# Patient Record
Sex: FEMALE | Race: WHITE | NOT HISPANIC OR LATINO | URBAN - METROPOLITAN AREA
[De-identification: names, ages, dates, MRNs, and addresses within clinical notes are randomized per-mention and may not be internally consistent; named-entity substitution may affect disease eponyms.]

---

## 2021-04-15 DIAGNOSIS — Z23 ENCOUNTER FOR IMMUNIZATION: ICD-10-CM

## 2021-06-11 ENCOUNTER — OFFICE VISIT (OUTPATIENT)
Dept: URGENT CARE | Facility: CLINIC | Age: 76
End: 2021-06-11
Payer: MEDICARE

## 2021-06-11 VITALS
BODY MASS INDEX: 33.43 KG/M2 | WEIGHT: 208 LBS | OXYGEN SATURATION: 98 % | HEART RATE: 90 BPM | TEMPERATURE: 99.3 F | HEIGHT: 66 IN | SYSTOLIC BLOOD PRESSURE: 130 MMHG | DIASTOLIC BLOOD PRESSURE: 90 MMHG | RESPIRATION RATE: 18 BRPM

## 2021-06-11 DIAGNOSIS — R09.82 POSTNASAL DRIP: Primary | ICD-10-CM

## 2021-06-11 PROCEDURE — 99213 OFFICE O/P EST LOW 20 MIN: CPT | Performed by: FAMILY MEDICINE

## 2021-06-11 RX ORDER — RAMIPRIL 10 MG/1
10 CAPSULE ORAL DAILY
COMMUNITY
End: 2022-04-18 | Stop reason: SDUPTHER

## 2021-06-11 RX ORDER — CETIRIZINE HYDROCHLORIDE 10 MG/1
10 TABLET ORAL
COMMUNITY

## 2021-06-11 RX ORDER — CARVEDILOL 12.5 MG/1
12.5 TABLET ORAL 2 TIMES DAILY WITH MEALS
COMMUNITY
End: 2022-04-18 | Stop reason: SDUPTHER

## 2021-06-11 RX ORDER — ATORVASTATIN CALCIUM 80 MG/1
80 TABLET, FILM COATED ORAL DAILY
COMMUNITY
End: 2022-04-18 | Stop reason: SDUPTHER

## 2021-06-11 RX ORDER — FLUTICASONE PROPIONATE 50 MCG
1 SPRAY, SUSPENSION (ML) NASAL 2 TIMES DAILY
Qty: 16 G | Refills: 0 | Status: SHIPPED | OUTPATIENT
Start: 2021-06-11 | End: 2022-05-19

## 2021-06-11 RX ORDER — NITROGLYCERIN 0.4 MG/1
0.4 TABLET SUBLINGUAL
COMMUNITY

## 2021-06-11 RX ORDER — METFORMIN HYDROCHLORIDE 750 MG/1
750 TABLET, EXTENDED RELEASE ORAL
COMMUNITY
End: 2022-04-18 | Stop reason: SDUPTHER

## 2021-06-11 RX ORDER — ESOMEPRAZOLE MAGNESIUM 40 MG/1
40 CAPSULE, DELAYED RELEASE ORAL DAILY
COMMUNITY

## 2021-06-11 RX ORDER — BENZONATATE 100 MG/1
100 CAPSULE ORAL 3 TIMES DAILY PRN
Qty: 20 CAPSULE | Refills: 0 | Status: SHIPPED | OUTPATIENT
Start: 2021-06-11 | End: 2022-05-19

## 2021-06-11 NOTE — PROGRESS NOTES
[facility]        NAME: Gasper Britt is a 78 y o  female  : 1945    MRN: [id number]  DATE: 2021  TIME: 1:55 PM    Assessment and Plan   Postnasal drip [R09 82]  1  Postnasal drip  benzonatate (TESSALON PERLES) 100 mg capsule    fluticasone (FLONASE) 50 mcg/act nasal spray     Bacterial sinusitis, strep pharyngitis and pneumonia unlikely per clinical evaluation  Likely secondary to postnasal drip from rhinitis  Patient advised on supportive therapy including remaining well hydrated, avoiding cold fluids and gargling with warm salt water twice daily  Flonase and Tessalon Perles prescribed for symptomatic relief  May f/u with PCP in a few days if Sxs worsen  Patient Instructions     Follow up with PCP in 3-5 days  Proceed to  ER if symptoms worsen  Chief Complaint     Chief Complaint   Patient presents with    Sinusitis     Patient complains of scratchy throat, nasal congestion, cough starting Wed  History of Present Illness       68-year-old female presents today with URI symptoms which started about 3-4 days ago including coughing, rhinorrhea, sinus pressure with some headaches  Cough affects her sleep  Also reports some chest pain with coughing  Her granddaughter had similar symptoms prior to the onset of hers  Denies any obvious fevers, chills, dyspnea or abdominal pain  Review of Systems   Review of Systems   Constitutional: Negative for chills and fever  HENT: Positive for rhinorrhea and sinus pressure  Negative for congestion  Respiratory: Positive for cough  Negative for shortness of breath and wheezing  Cardiovascular: Positive for chest pain (with cough)  Gastrointestinal: Positive for nausea  Negative for abdominal pain  Neurological: Positive for headaches  Negative for dizziness  Psychiatric/Behavioral: Positive for sleep disturbance           Current Medications       Current Outpatient Medications:     ASPIRIN 81 PO, Take 81 mg by mouth, Disp: , Rfl:     atorvastatin (LIPITOR) 80 mg tablet, Take 80 mg by mouth daily, Disp: , Rfl:     Calcium Carb-Cholecalciferol 600-800 MG-UNIT CHEW, Chew 1 tablet daily, Disp: , Rfl:     carvedilol (COREG) 12 5 mg tablet, Take 12 5 mg by mouth 2 (two) times a day with meals, Disp: , Rfl:     cetirizine (ZyrTEC Allergy) 10 mg tablet, Take 10 mg by mouth, Disp: , Rfl:     esomeprazole (NexIUM) 40 MG capsule, Take 40 mg by mouth daily, Disp: , Rfl:     metFORMIN (GLUCOPHAGE-XR) 750 mg 24 hr tablet, Take 750 mg by mouth daily with breakfast, Disp: , Rfl:     ramipril (ALTACE) 10 MG capsule, Take 10 mg by mouth daily, Disp: , Rfl:     benzonatate (TESSALON PERLES) 100 mg capsule, Take 1 capsule (100 mg total) by mouth 3 (three) times a day as needed for cough, Disp: 20 capsule, Rfl: 0    fluticasone (FLONASE) 50 mcg/act nasal spray, 1 spray into each nostril 2 (two) times a day, Disp: 16 g, Rfl: 0    nitroglycerin (NITROSTAT) 0 4 mg SL tablet, Place 0 4 mg under the tongue, Disp: , Rfl:     Current Allergies     Allergies as of 06/11/2021 - Reviewed 06/11/2021   Allergen Reaction Noted    Prasugrel Rash 02/09/2018            The following portions of the patient's history were reviewed and updated as appropriate: allergies, current medications, past family history, past medical history, past social history, past surgical history and problem list      Past Medical History:   Diagnosis Date    GERD (gastroesophageal reflux disease)     Heart attack ([facility] [address] )     High cholesterol     Hypertension        Past Surgical History:   Procedure Laterality Date    CARPAL TUNNEL RELEASE      CORONARY ANGIOPLASTY WITH STENT PLACEMENT         History reviewed  No pertinent family history  Medications have been verified  Objective   /90   Pulse 90   Temp 99 3 °F (37 4 °C) (Tympanic)   Resp 18   Ht 5' 6" (1 676 m)   Wt 94 3 kg (208 lb)   SpO2 98%   BMI 33 57 kg/m²   No LMP recorded  Physical Exam     Physical Exam  Vitals signs and nursing note reviewed  Constitutional:       General: She is in acute distress (Occasional cough)  Appearance: Normal appearance  She is obese  She is not ill-appearing, toxic-appearing or diaphoretic  HENT:      Head: Normocephalic and atraumatic  Nose: Rhinorrhea present  Comments: Inflamed nasal mucosa     Mouth/Throat:      Mouth: Mucous membranes are moist       Pharynx: No posterior oropharyngeal erythema  Eyes:      General:         Right eye: No discharge  Left eye: No discharge  Conjunctiva/sclera: Conjunctivae normal    Cardiovascular:      Rate and Rhythm: Normal rate and regular rhythm  Pulmonary:      Effort: Pulmonary effort is normal  No respiratory distress  Breath sounds: Normal breath sounds  No wheezing, rhonchi or rales  Skin:     General: Skin is warm  Findings: No erythema  Neurological:      General: No focal deficit present  Mental Status: She is alert and oriented to person, place, and time  Psychiatric:         Mood and Affect: Mood normal          Behavior: Behavior normal          Thought Content:  Thought content normal          Judgment: Judgment normal

## 2022-04-18 ENCOUNTER — OFFICE VISIT (OUTPATIENT)
Dept: CARDIOLOGY CLINIC | Facility: CLINIC | Age: 77
End: 2022-04-18
Payer: MEDICARE

## 2022-04-18 VITALS
TEMPERATURE: 99.9 F | HEIGHT: 66 IN | WEIGHT: 210 LBS | BODY MASS INDEX: 33.75 KG/M2 | SYSTOLIC BLOOD PRESSURE: 138 MMHG | HEART RATE: 69 BPM | OXYGEN SATURATION: 95 % | DIASTOLIC BLOOD PRESSURE: 80 MMHG

## 2022-04-18 DIAGNOSIS — R06.00 DYSPNEA ON EXERTION: ICD-10-CM

## 2022-04-18 DIAGNOSIS — R06.02 SHORTNESS OF BREATH: ICD-10-CM

## 2022-04-18 DIAGNOSIS — Z76.89 ENCOUNTER TO ESTABLISH CARE: Primary | ICD-10-CM

## 2022-04-18 DIAGNOSIS — E11.9 TYPE 2 DIABETES MELLITUS WITHOUT COMPLICATION, WITHOUT LONG-TERM CURRENT USE OF INSULIN (HCC): ICD-10-CM

## 2022-04-18 DIAGNOSIS — I25.10 CORONARY ARTERY DISEASE INVOLVING NATIVE CORONARY ARTERY OF NATIVE HEART WITHOUT ANGINA PECTORIS: ICD-10-CM

## 2022-04-18 DIAGNOSIS — E78.5 DYSLIPIDEMIA: ICD-10-CM

## 2022-04-18 PROBLEM — I25.118 CORONARY ARTERY DISEASE OF NATIVE ARTERY OF NATIVE HEART WITH STABLE ANGINA PECTORIS (HCC): Status: ACTIVE | Noted: 2022-04-18

## 2022-04-18 PROCEDURE — 99204 OFFICE O/P NEW MOD 45 MIN: CPT | Performed by: INTERNAL MEDICINE

## 2022-04-18 RX ORDER — ATORVASTATIN CALCIUM 80 MG/1
80 TABLET, FILM COATED ORAL DAILY
Qty: 30 TABLET | Refills: 0 | Status: SHIPPED | OUTPATIENT
Start: 2022-04-18 | End: 2022-05-19 | Stop reason: SDUPTHER

## 2022-04-18 RX ORDER — RAMIPRIL 10 MG/1
10 CAPSULE ORAL DAILY
Qty: 30 CAPSULE | Refills: 0 | Status: SHIPPED | OUTPATIENT
Start: 2022-04-18 | End: 2022-05-19 | Stop reason: SDUPTHER

## 2022-04-18 RX ORDER — CARVEDILOL 12.5 MG/1
12.5 TABLET ORAL 2 TIMES DAILY WITH MEALS
Qty: 60 TABLET | Refills: 0 | Status: SHIPPED | OUTPATIENT
Start: 2022-04-18 | End: 2022-05-19 | Stop reason: SDUPTHER

## 2022-04-18 RX ORDER — METFORMIN HYDROCHLORIDE 750 MG/1
750 TABLET, EXTENDED RELEASE ORAL
Qty: 30 TABLET | Refills: 0 | Status: SHIPPED | OUTPATIENT
Start: 2022-04-18 | End: 2022-05-19 | Stop reason: SDUPTHER

## 2022-04-18 NOTE — PROGRESS NOTES
[facility]  [address] [facility] [address]   [place], [address]    Cardiology Consultation    Consuelo Marshall  [id number]  1945      Consult for: CAD  Appreciate consult by: No primary care provider on file  Discussion/Summary:     1  Encounter to establish care  - Blood work ordered including Lipid panel and CMP    - Encouraged to establish care with local PCP   - POCT ECG    2  Coronary artery disease involving native coronary artery of native heart without angina pectoris  - She has a history of multivessel PCI  She denies any angina symptoms  She has been feeling worsening dyspnea recently however  Will schedule for nuclear stress test for further evaluation  Patient cannot walk on treadmill  Study will be done with pharmacological agent  She had normal nuclear study in 2020   - continue carvedilol and atorvastatin  - lipid panel ordered  3  Dyspnea on exertion  - echocardiogram ordered  4  Type 2 diabetes mellitus without complication, without long-term current use of insulin (HCC)  - Continue metformin    - HEMOGLOBIN A1C W/ EAG ESTIMATION    5  Dyslipidemia  - continue atorvastatin  - lipid panel ordered  HPI:     Consuelo Marshall is a 78 y o  here for evaluation of coronary artery disease  The patient recently moved to the area  Was previously followed with a cardiologist in at [place] [address]  Records were reviewed  She has a history of coronary artery disease with previous multivessel PCI in 2012 and 2013  She denies any history of myocardial infarction  She was feeling fatigue and weakness prior to stent placement which improved after her stents were complete  She had stress test done in October 2020 which was normal   Since then however she has noticed worsening exertional dyspnea  She is unsure if it is due to poor conditioning    Onset was gradual   She denies any lower extremity edema, orthopnea or paroxysmal nocturnal dyspnea  She denies any chest pain  PCI (mLAD and D1 stents) - 1/9/2013   PCI (PCI: pRCA, mRCA, PDA) - 5/29/2012         Past Medical History:   Diagnosis Date    GERD (gastroesophageal reflux disease)     [facility] [address] )     High cholesterol     Hypertension      Social History     Tobacco Use    Smoking status: Former Smoker    Smokeless tobacco: Never Used   Vaping Use    Vaping Use: Never used   Substance Use Topics    Alcohol use: Not on file    Drug use: Not on file      No family history on file    Past Surgical History:   Procedure Laterality Date    CARPAL TUNNEL RELEASE      CORONARY ANGIOPLASTY WITH STENT PLACEMENT         Current Outpatient Medications:     ASPIRIN 81 PO, Take 81 mg by mouth, Disp: , Rfl:     atorvastatin (LIPITOR) 80 mg tablet, Take 1 tablet (80 mg total) by mouth daily, Disp: 30 tablet, Rfl: 0    Calcium Carb-Cholecalciferol 600-800 MG-UNIT CHEW, Chew 1 tablet daily, Disp: , Rfl:     carvedilol (COREG) 12 5 mg tablet, Take 1 tablet (12 5 mg total) by mouth 2 (two) times a day with meals, Disp: 60 tablet, Rfl: 0    cetirizine (ZyrTEC Allergy) 10 mg tablet, Take 10 mg by mouth, Disp: , Rfl:     esomeprazole (NexIUM) 40 MG capsule, Take 40 mg by mouth daily, Disp: , Rfl:     metFORMIN (GLUCOPHAGE-XR) 750 mg 24 hr tablet, Take 1 tablet (750 mg total) by mouth daily with breakfast, Disp: 30 tablet, Rfl: 0    nitroglycerin (NITROSTAT) 0 4 mg SL tablet, Place 0 4 mg under the tongue, Disp: , Rfl:     ramipril (ALTACE) 10 MG capsule, Take 1 capsule (10 mg total) by mouth daily, Disp: 30 capsule, Rfl: 0    benzonatate (TESSALON PERLES) 100 mg capsule, Take 1 capsule (100 mg total) by mouth 3 (three) times a day as needed for cough, Disp: 20 capsule, Rfl: 0    fluticasone (FLONASE) 50 mcg/act nasal spray, 1 spray into each nostril 2 (two) times a day, Disp: 16 g, Rfl: 0  Allergies   Allergen Reactions    Prasugrel Rash     effient       Review of Systems:   Review of Systems   Constitutional: Positive for fatigue  Respiratory: Positive for shortness of breath  Cardiovascular: Negative for chest pain, palpitations and leg swelling  Musculoskeletal: Positive for arthralgias  All other systems reviewed and are negative  Physical Examination:     Vitals:    04/18/22 1314   BP: 138/80   BP Location: Right arm   Patient Position: Sitting   Cuff Size: Large   Pulse: 69   Temp: 99 9 °F (37 7 °C)   SpO2: 95%   Weight: 95 3 kg (210 lb)   Height: 5' 6" (1 676 m)       Physical Exam   Constitutional: She appears healthy  No distress  Eyes: Pupils are equal, round, and reactive to light  Conjunctivae are normal    Neck: No JVD present  Cardiovascular: Normal rate, regular rhythm and normal heart sounds  Exam reveals no gallop and no friction rub  No murmur heard  Pulmonary/Chest: Effort normal and breath sounds normal  She has no wheezes  She has no rales  Musculoskeletal:         General: No tenderness, deformity or edema  Cervical back: Normal range of motion and neck supple  Neurological: She is alert and oriented to person, place, and time  Skin: Skin is warm and dry  Labs:   No results found for: WBC, HGB, HCT, MCV, RDW, PLT  BMP:  No results found for: SODIUM, K, CL, CO2, ANIONGAP, BUN, CREATININE, GLUC, GLUF, CALCIUM, CORRECTEDCA, EGFR, MG  LFT:  No results found for: AST, ALT, ALKPHOS, TP, ALB   No results found for: [id number]  No results found for: HGBA1C  Lipid Profile:   No results found for: CHOLESTEROL, HDL, LDLCALC, TRIG  No results found for: CHOLESTEROL  No results found for: CKTOTAL, CKMB, CKMBINDEX, TROPONINI  No results found for: NTBNP   No results found for this or any previous visit (from the past 672 hour(s))  Imaging & Testing   I have personally reviewed pertinent reports  Cardiac Testing   No results found for this or any previous visit  EKG: Personally reviewed      Normal sinus rhythm with LVH      Domenico Waller Jun WORKMAN  [phone]  Please call with any questions

## 2022-04-19 PROBLEM — E78.5 DYSLIPIDEMIA: Status: ACTIVE | Noted: 2022-04-19

## 2022-04-19 PROBLEM — E11.9 TYPE 2 DIABETES MELLITUS WITHOUT COMPLICATION, WITHOUT LONG-TERM CURRENT USE OF INSULIN (HCC): Status: ACTIVE | Noted: 2022-04-19

## 2022-04-19 PROBLEM — I25.10 CORONARY ARTERY DISEASE INVOLVING NATIVE CORONARY ARTERY OF NATIVE HEART WITHOUT ANGINA PECTORIS: Status: ACTIVE | Noted: 2022-04-18

## 2022-04-19 PROCEDURE — 93000 ELECTROCARDIOGRAM COMPLETE: CPT | Performed by: INTERNAL MEDICINE

## 2022-05-03 ENCOUNTER — APPOINTMENT (OUTPATIENT)
Dept: LAB | Facility: HOSPITAL | Age: 77
End: 2022-05-03
Payer: MEDICARE

## 2022-05-03 LAB
ALBUMIN SERPL BCP-MCNC: 3.7 G/DL (ref 3.5–5)
ALP SERPL-CCNC: 94 U/L (ref 46–116)
ALT SERPL W P-5'-P-CCNC: 48 U/L (ref 12–78)
ANION GAP SERPL CALCULATED.3IONS-SCNC: 8 MMOL/L (ref 4–13)
AST SERPL W P-5'-P-CCNC: 21 U/L (ref 5–45)
BILIRUB SERPL-MCNC: 0.64 MG/DL (ref 0.2–1)
BUN SERPL-MCNC: 14 MG/DL (ref 5–25)
CALCIUM SERPL-MCNC: 8.9 MG/DL (ref 8.3–10.1)
CHLORIDE SERPL-SCNC: 102 MMOL/L (ref 100–108)
CHOLEST SERPL-MCNC: 149 MG/DL
CO2 SERPL-SCNC: 28 MMOL/L (ref 21–32)
CREAT SERPL-MCNC: 0.91 MG/DL (ref 0.6–1.3)
EST. AVERAGE GLUCOSE BLD GHB EST-MCNC: 177 MG/DL
GFR SERPL CREATININE-BSD FRML MDRD: 62 ML/MIN/1.73SQ M
GLUCOSE P FAST SERPL-MCNC: 161 MG/DL (ref 65–99)
HBA1C MFR BLD: 7.8 %
HDLC SERPL-MCNC: 44 MG/DL
LDLC SERPL CALC-MCNC: 75 MG/DL (ref 0–100)
NONHDLC SERPL-MCNC: 105 MG/DL
POTASSIUM SERPL-SCNC: 3.7 MMOL/L (ref 3.5–5.3)
PROT SERPL-MCNC: 7.5 G/DL (ref 6.4–8.2)
SODIUM SERPL-SCNC: 138 MMOL/L (ref 136–145)
TRIGL SERPL-MCNC: 150 MG/DL

## 2022-05-03 PROCEDURE — 80061 LIPID PANEL: CPT | Performed by: INTERNAL MEDICINE

## 2022-05-03 PROCEDURE — 80053 COMPREHEN METABOLIC PANEL: CPT | Performed by: INTERNAL MEDICINE

## 2022-05-03 PROCEDURE — 36415 COLL VENOUS BLD VENIPUNCTURE: CPT | Performed by: INTERNAL MEDICINE

## 2022-05-03 PROCEDURE — 83036 HEMOGLOBIN GLYCOSYLATED A1C: CPT | Performed by: INTERNAL MEDICINE

## 2022-05-09 ENCOUNTER — TELEPHONE (OUTPATIENT)
Dept: CARDIOLOGY CLINIC | Facility: CLINIC | Age: 77
End: 2022-05-09

## 2022-05-09 NOTE — TELEPHONE ENCOUNTER
----- Message from Emerald Chaves DO sent at 5/6/2022  4:56 PM EDT -----  Can you please let the patient know I reviewed her blood work  Her sugar was high  Her cholesterol was minimally elevated  We will review during her upcoming visit

## 2022-05-10 ENCOUNTER — HOSPITAL ENCOUNTER (OUTPATIENT)
Dept: RADIOLOGY | Facility: HOSPITAL | Age: 77
Discharge: HOME | End: 2022-05-10
Attending: INTERNAL MEDICINE
Payer: MEDICARE

## 2022-05-10 ENCOUNTER — HOSPITAL ENCOUNTER (OUTPATIENT)
Dept: NON INVASIVE DIAGNOSTICS | Facility: HOSPITAL | Age: 77
Discharge: HOME | End: 2022-05-10
Attending: INTERNAL MEDICINE
Payer: MEDICARE

## 2022-05-10 VITALS
HEART RATE: 71 BPM | HEIGHT: 66 IN | WEIGHT: 210 LBS | SYSTOLIC BLOOD PRESSURE: 167 MMHG | DIASTOLIC BLOOD PRESSURE: 79 MMHG | BODY MASS INDEX: 33.75 KG/M2

## 2022-05-10 DIAGNOSIS — I25.10 CORONARY ARTERY DISEASE INVOLVING NATIVE CORONARY ARTERY OF NATIVE HEART WITHOUT ANGINA PECTORIS: ICD-10-CM

## 2022-05-10 DIAGNOSIS — R06.02 SHORTNESS OF BREATH: ICD-10-CM

## 2022-05-10 DIAGNOSIS — R06.00 DYSPNEA ON EXERTION: ICD-10-CM

## 2022-05-10 LAB
AORTIC ROOT: 3.7 CM
APICAL FOUR CHAMBER EJECTION FRACTION: 44 %
AV LVOT PEAK GRADIENT: 4 MMHG
AV PEAK GRADIENT: 11 MMHG
CHEST PAIN STATEMENT: NORMAL
DOP CALC LVOT AREA: 3.14 CM2
DOP CALC LVOT DIAMETER: 2 CM
E WAVE DECELERATION TIME: 183 MS
FRACTIONAL SHORTENING: 29 % (ref 28–44)
INTERVENTRICULAR SEPTUM IN DIASTOLE (PARASTERNAL SHORT AXIS VIEW): 1.2 CM
INTERVENTRICULAR SEPTUM: 1.2 CM (ref 0.55–1.03)
LAAS-AP2: 25.2 CM2
LAAS-AP4: 23.7 CM2
LEFT ATRIUM AREA SYSTOLE SINGLE PLANE A4C: 22.9 CM2
LEFT ATRIUM SIZE: 4.2 CM
LEFT INTERNAL DIMENSION IN SYSTOLE: 3.5 CM (ref 3.41–5.16)
LEFT VENTRICLE DIASTOLIC VOLUME (MOD BIPLANE): 131 ML (ref 102.55–230.95)
LEFT VENTRICLE SYSTOLIC VOLUME (MOD BIPLANE): 75 ML
LEFT VENTRICULAR INTERNAL DIMENSION IN DIASTOLE: 4.9 CM (ref 5.64–8.4)
LEFT VENTRICULAR POSTERIOR WALL IN END DIASTOLE: 1.2 CM (ref 0.53–1.01)
LEFT VENTRICULAR STROKE VOLUME: 60 ML
LV EF: 43 %
LVSV (TEICH): 60 ML
MAX DIASTOLIC BP: 90 MMHG
MAX HEART RATE: 104 BPM
MAX PREDICTED HEART RATE: 146 BPM
MAX. SYSTOLIC BP: 184 MMHG
MV E'TISSUE VEL-LAT: 6 CM/S
MV E'TISSUE VEL-SEP: 7 CM/S
MV PEAK A VEL: 1.13 M/S
MV PEAK E VEL: 75 CM/S
MV STENOSIS PRESSURE HALF TIME: 53 MS
MV VALVE AREA P 1/2 METHOD: 4.15 CM2
PROTOCOL NAME: NORMAL
PV PEAK GRADIENT: 4 MMHG
REASON FOR TERMINATION: NORMAL
RIGHT ATRIUM AREA SYSTOLE A4C: 15.3 CM2
RIGHT VENTRICLE ID DIMENSION: 3 CM
SL CV LEFT ATRIUM LENGTH A2C: 5.7 CM
SL CV LV DIAS VOL ENDO Z SCORE: -1.11
SL CV PED ECHO LEFT VENTRICLE DIASTOLIC VOLUME (MOD BIPLANE) 2D: 111 ML
SL CV PED ECHO LEFT VENTRICLE SYSTOLIC VOLUME (MOD BIPLANE) 2D: 50 ML
TARGET HR FORMULA: NORMAL
TEST INDICATION: NORMAL
TIME IN EXERCISE PHASE: NORMAL
TR MAX PG: 19 MMHG
TR PEAK VELOCITY: 2.2 M/S
TRICUSPID VALVE PEAK REGURGITATION VELOCITY: 2.15 M/S
Z-SCORE OF INTERVENTRICULAR SEPTUM IN END DIASTOLE: 3.39
Z-SCORE OF LEFT VENTRICULAR DIMENSION IN END DIASTOLE: -3.4
Z-SCORE OF LEFT VENTRICULAR DIMENSION IN END SYSTOLE: -1.43
Z-SCORE OF LEFT VENTRICULAR POSTERIOR WALL IN END DIASTOLE: 3.5

## 2022-05-10 PROCEDURE — 93306 TTE W/DOPPLER COMPLETE: CPT | Performed by: INTERNAL MEDICINE

## 2022-05-10 PROCEDURE — G1004 CDSM NDSC: HCPCS

## 2022-05-10 PROCEDURE — 93017 CV STRESS TEST TRACING ONLY: CPT

## 2022-05-10 PROCEDURE — A9502 TC99M TETROFOSMIN: HCPCS

## 2022-05-10 PROCEDURE — 93306 TTE W/DOPPLER COMPLETE: CPT

## 2022-05-10 PROCEDURE — 78452 HT MUSCLE IMAGE SPECT MULT: CPT

## 2022-05-10 RX ADMIN — REGADENOSON 0.4 MG: 0.08 INJECTION, SOLUTION INTRAVENOUS at 10:14

## 2022-05-11 LAB
MAX HR PERCENT: 71 %
MAX HR: 146 BPM
NUC STRESS EJECTION FRACTION: 80 %
RATE PRESSURE PRODUCT: NORMAL
SL CV REST NUCLEAR ISOTOPE DOSE: 11 MCI
SL CV STRESS NUCLEAR ISOTOPE DOSE: 32.8 MCI
SL CV STRESS RECOVERY BP: NORMAL MMHG
SL CV STRESS RECOVERY HR: 88 BPM
SL CV STRESS RECOVERY O2 SAT: 98 %
STRESS ANGINA INDEX: 1
STRESS BASELINE BP: NORMAL MMHG
STRESS BASELINE HR: 61 BPM
STRESS O2 SAT REST: 100 %
STRESS PEAK HR: 104 BPM
STRESS PERCENT HR: 71 %
STRESS POST ESTIMATED WORKLOAD: 1 METS
STRESS POST EXERCISE DUR MIN: 1 MIN
STRESS POST O2 SAT PEAK: 98 %
STRESS POST PEAK BP: 173 MMHG
STRESS TARGET HR: 104 BPM

## 2022-05-11 PROCEDURE — 78452 HT MUSCLE IMAGE SPECT MULT: CPT | Performed by: INTERNAL MEDICINE

## 2022-05-11 PROCEDURE — 93018 CV STRESS TEST I&R ONLY: CPT | Performed by: INTERNAL MEDICINE

## 2022-05-11 PROCEDURE — 93016 CV STRESS TEST SUPVJ ONLY: CPT | Performed by: INTERNAL MEDICINE

## 2022-05-19 ENCOUNTER — OFFICE VISIT (OUTPATIENT)
Dept: CARDIOLOGY CLINIC | Facility: CLINIC | Age: 77
End: 2022-05-19
Payer: MEDICARE

## 2022-05-19 VITALS
SYSTOLIC BLOOD PRESSURE: 142 MMHG | DIASTOLIC BLOOD PRESSURE: 68 MMHG | HEART RATE: 68 BPM | HEIGHT: 66 IN | WEIGHT: 208 LBS | BODY MASS INDEX: 33.43 KG/M2

## 2022-05-19 DIAGNOSIS — E11.9 TYPE 2 DIABETES MELLITUS WITHOUT COMPLICATION, WITHOUT LONG-TERM CURRENT USE OF INSULIN (HCC): ICD-10-CM

## 2022-05-19 DIAGNOSIS — I10 PRIMARY HYPERTENSION: ICD-10-CM

## 2022-05-19 DIAGNOSIS — E78.5 DYSLIPIDEMIA: ICD-10-CM

## 2022-05-19 DIAGNOSIS — I25.10 CORONARY ARTERY DISEASE INVOLVING NATIVE CORONARY ARTERY OF NATIVE HEART WITHOUT ANGINA PECTORIS: Primary | ICD-10-CM

## 2022-05-19 PROCEDURE — 99214 OFFICE O/P EST MOD 30 MIN: CPT | Performed by: INTERNAL MEDICINE

## 2022-05-19 RX ORDER — ATORVASTATIN CALCIUM 80 MG/1
80 TABLET, FILM COATED ORAL DAILY
Qty: 30 TABLET | Refills: 0 | Status: SHIPPED | OUTPATIENT
Start: 2022-05-19 | End: 2022-06-08 | Stop reason: SDUPTHER

## 2022-05-19 RX ORDER — RAMIPRIL 10 MG/1
10 CAPSULE ORAL DAILY
Qty: 90 CAPSULE | Refills: 3 | Status: SHIPPED | OUTPATIENT
Start: 2022-05-19

## 2022-05-19 RX ORDER — METFORMIN HYDROCHLORIDE 750 MG/1
750 TABLET, EXTENDED RELEASE ORAL
Qty: 30 TABLET | Refills: 0 | Status: SHIPPED | OUTPATIENT
Start: 2022-05-19 | End: 2022-06-22 | Stop reason: SDUPTHER

## 2022-05-19 RX ORDER — ATORVASTATIN CALCIUM 80 MG/1
80 TABLET, FILM COATED ORAL DAILY
Qty: 90 TABLET | Refills: 3 | Status: SHIPPED | OUTPATIENT
Start: 2022-05-19 | End: 2022-05-19 | Stop reason: SDUPTHER

## 2022-05-19 RX ORDER — METFORMIN HYDROCHLORIDE 750 MG/1
750 TABLET, EXTENDED RELEASE ORAL
Qty: 90 TABLET | Refills: 3 | Status: SHIPPED | OUTPATIENT
Start: 2022-05-19 | End: 2022-05-19 | Stop reason: SDUPTHER

## 2022-05-19 RX ORDER — CARVEDILOL 12.5 MG/1
12.5 TABLET ORAL 2 TIMES DAILY WITH MEALS
Qty: 180 TABLET | Refills: 3 | Status: SHIPPED | OUTPATIENT
Start: 2022-05-19

## 2022-05-19 NOTE — PROGRESS NOTES
Cardiology   Trip Foster DO, Tamica Huntley MD, Destiney Hernández MD, Domo Puga MD, [facility] [place]  -------------------------------------------------------------------  [facility]  [address], [address] [facility] [place] [address], [facility]  [phone]    Cardiology Follow Up  Lacne Mohamud  1945  [id number]          Assessment/Plan:    1  Coronary artery disease involving native coronary artery of native heart without angina pectoris    2  Type 2 diabetes mellitus without complication, without long-term current use of insulin ([facility] [address] )    3  Dyslipidemia    4  Primary hypertension      - recent stress test reviewed  No ischemia or infarction present  Ejection fraction was normal on echocardiogram   - Discussed risk factor reduction including refraining from smoking, eating a diet high in fruits and vegetables, maintaining a healthy weight, limiting screen time along with controlling BP and cholesterol  Encouraged to exercise 150 minutes a week at a moderate level such as a fast walk or 75 minutes of high intensity   - A1C was elevated on blood work  Given history of CAD diabetes, will begin Jardiance 25 mg daily she should use this in addition to metformin  Encouraged to establish care with primary medical doctor  - blood pressure is elevated today  Will recheck next visit  May need to adjust medications if needed to reach blood pressure goal of less than 130/80   - continue atorvastatin 80 mg daily  - continue carvedilol 12 5 mg b i d  And ramipril 10 mg daily  Interval History:     Lance Mohamud is 78 y o  female here for followup of recent cardiac testing  The patient had stress test and echocardiogram done because of exertional dyspnea  She has a history of coronary artery disease with previous multivessel PCI in 2012 and 2013 (PCI mLAD and D1 stents - 1/9/2013 + PCI: Norris Hagen, LYNETTE - 5/29/2012     She denies any history of myocardial infarction  She was feeling fatigue and weakness prior to stent placement which improved after her stents were complete  Nuclear stress test was done which did not show any ischemia or infarction  Ejection fraction was 80%  Echocardiogram showed ejection fraction of 60% with moderate LA dilation and mild valve disease  Since her last visit, she denies any palpitations, chest pain,  LE edema, orthopnea or PND  She still feels some shortness of breath with exertion  Diet is overall unchanged  There has not been a significant change in weight        The following portions of the patient's history were reviewed and updated as appropriate: allergies, current medications, past family history, past medical history, past social history, past surgical history, and problem list        Current Outpatient Medications:     ASPIRIN 81 PO, Take 81 mg by mouth, Disp: , Rfl:     atorvastatin (LIPITOR) 80 mg tablet, Take 1 tablet (80 mg total) by mouth daily, Disp: 30 tablet, Rfl: 0    Calcium Carb-Cholecalciferol 600-800 MG-UNIT CHEW, Chew 1 tablet daily, Disp: , Rfl:     carvedilol (COREG) 12 5 mg tablet, Take 1 tablet (12 5 mg total) by mouth 2 (two) times a day with meals, Disp: 60 tablet, Rfl: 0    esomeprazole (NexIUM) 40 MG capsule, Take 40 mg by mouth daily, Disp: , Rfl:     metFORMIN (GLUCOPHAGE-XR) 750 mg 24 hr tablet, Take 1 tablet (750 mg total) by mouth daily with breakfast, Disp: 30 tablet, Rfl: 0    ramipril (ALTACE) 10 MG capsule, Take 1 capsule (10 mg total) by mouth daily, Disp: 30 capsule, Rfl: 0    benzonatate (TESSALON PERLES) 100 mg capsule, Take 1 capsule (100 mg total) by mouth 3 (three) times a day as needed for cough, Disp: 20 capsule, Rfl: 0    cetirizine (ZyrTEC) 10 mg tablet, Take 10 mg by mouth (Patient not taking: Reported on 5/19/2022), Disp: , Rfl:     fluticasone (FLONASE) 50 mcg/act nasal spray, 1 spray into each nostril 2 (two) times a day, Disp: 16 g, Rfl: 0    nitroglycerin (NITROSTAT) 0 4 mg SL tablet, Place 0 4 mg under the tongue (Patient not taking: Reported on 5/19/2022), Disp: , Rfl:         Review of Systems:  Review of Systems   Respiratory: Positive for shortness of breath  Cardiovascular: Negative for chest pain, palpitations and leg swelling  Musculoskeletal: Positive for arthralgias  All other systems reviewed and are negative  Physical Exam:  Vitals:  Vitals:    05/19/22 0950   BP: 142/68   BP Location: Left arm   Cuff Size: Large   Pulse: 68   Weight: 94 3 kg (208 lb)   Height: 5' 6" (1 676 m)     Physical Exam   Constitutional: She appears healthy  No distress  Eyes: Pupils are equal, round, and reactive to light  Conjunctivae are normal    Neck: No JVD present  Cardiovascular: Normal rate, regular rhythm and normal heart sounds  Exam reveals no gallop and no friction rub  No murmur heard  Pulmonary/Chest: Effort normal and breath sounds normal  She has no wheezes  She has no rales  Musculoskeletal:         General: No tenderness, deformity or edema  Cervical back: Normal range of motion and neck supple  Neurological: She is alert and oriented to person, place, and time  Skin: Skin is warm and dry  Cardiographics:  EKG: Personally reviewed   Last known EF: 55%    This note was completed in part utilizing M-Modal Fluency Direct Software  Grammatical errors, random word insertions, spelling mistakes, and incomplete sentences can be an occasional consequence of this system secondary to software limitations, ambient noise, and hardware issues  If you have any questions or concerns about the content, text, or information contained within the body of this dictation, please contact the provider for clarification

## 2022-05-20 PROBLEM — I10 PRIMARY HYPERTENSION: Status: ACTIVE | Noted: 2022-05-20

## 2022-06-08 DIAGNOSIS — I25.10 CORONARY ARTERY DISEASE INVOLVING NATIVE CORONARY ARTERY OF NATIVE HEART WITHOUT ANGINA PECTORIS: ICD-10-CM

## 2022-06-10 RX ORDER — ATORVASTATIN CALCIUM 80 MG/1
80 TABLET, FILM COATED ORAL DAILY
Qty: 90 TABLET | Refills: 1 | Status: SHIPPED | OUTPATIENT
Start: 2022-06-10

## 2022-06-22 ENCOUNTER — TELEMEDICINE (OUTPATIENT)
Dept: FAMILY MEDICINE CLINIC | Facility: CLINIC | Age: 77
End: 2022-06-22
Payer: MEDICARE

## 2022-06-22 DIAGNOSIS — Z12.12 SCREENING FOR COLORECTAL CANCER: ICD-10-CM

## 2022-06-22 DIAGNOSIS — I25.10 CORONARY ARTERY DISEASE INVOLVING NATIVE CORONARY ARTERY OF NATIVE HEART WITHOUT ANGINA PECTORIS: ICD-10-CM

## 2022-06-22 DIAGNOSIS — Z12.31 ENCOUNTER FOR SCREENING MAMMOGRAM FOR MALIGNANT NEOPLASM OF BREAST: ICD-10-CM

## 2022-06-22 DIAGNOSIS — E11.22 TYPE 2 DIABETES MELLITUS WITH STAGE 2 CHRONIC KIDNEY DISEASE, WITHOUT LONG-TERM CURRENT USE OF INSULIN (HCC): ICD-10-CM

## 2022-06-22 DIAGNOSIS — Z00.00 MEDICARE ANNUAL WELLNESS VISIT, INITIAL: ICD-10-CM

## 2022-06-22 DIAGNOSIS — Z12.11 SCREENING FOR COLORECTAL CANCER: ICD-10-CM

## 2022-06-22 DIAGNOSIS — E78.5 DYSLIPIDEMIA: ICD-10-CM

## 2022-06-22 DIAGNOSIS — N18.2 TYPE 2 DIABETES MELLITUS WITH STAGE 2 CHRONIC KIDNEY DISEASE, WITHOUT LONG-TERM CURRENT USE OF INSULIN (HCC): ICD-10-CM

## 2022-06-22 DIAGNOSIS — Z11.59 NEED FOR HEPATITIS C SCREENING TEST: ICD-10-CM

## 2022-06-22 DIAGNOSIS — U07.1 COVID-19: Primary | ICD-10-CM

## 2022-06-22 PROBLEM — E88.810 METABOLIC SYNDROME: Status: ACTIVE | Noted: 2018-04-10

## 2022-06-22 PROBLEM — I20.8 ANGINA OF EFFORT (HCC): Status: ACTIVE | Noted: 2020-09-27

## 2022-06-22 PROBLEM — R06.09 DYSPNEA ON EXERTION: Status: ACTIVE | Noted: 2020-09-27

## 2022-06-22 PROBLEM — E88.81 METABOLIC SYNDROME: Status: ACTIVE | Noted: 2018-04-10

## 2022-06-22 PROBLEM — E11.29 TYPE 2 DIABETES MELLITUS WITH KIDNEY COMPLICATION, WITHOUT LONG-TERM CURRENT USE OF INSULIN (HCC): Status: ACTIVE | Noted: 2022-04-19

## 2022-06-22 PROBLEM — I25.2 HISTORY OF NON-ST ELEVATION MYOCARDIAL INFARCTION (NSTEMI): Status: ACTIVE | Noted: 2020-09-27

## 2022-06-22 PROBLEM — I20.89 ANGINA OF EFFORT: Status: ACTIVE | Noted: 2020-09-27

## 2022-06-22 PROBLEM — R06.00 DYSPNEA ON EXERTION: Status: ACTIVE | Noted: 2020-09-27

## 2022-06-22 PROCEDURE — 1123F ACP DISCUSS/DSCN MKR DOCD: CPT | Performed by: NURSE PRACTITIONER

## 2022-06-22 PROCEDURE — G0438 PPPS, INITIAL VISIT: HCPCS | Performed by: NURSE PRACTITIONER

## 2022-06-22 PROCEDURE — 99213 OFFICE O/P EST LOW 20 MIN: CPT | Performed by: NURSE PRACTITIONER

## 2022-06-22 RX ORDER — METFORMIN HYDROCHLORIDE 750 MG/1
750 TABLET, EXTENDED RELEASE ORAL
Qty: 30 TABLET | Refills: 0 | Status: SHIPPED | OUTPATIENT
Start: 2022-06-22 | End: 2022-06-30 | Stop reason: SDUPTHER

## 2022-06-22 NOTE — PATIENT INSTRUCTIONS
Wellness Visit for Adults   AMBULATORY CARE:   A wellness visit  is when you see your healthcare provider to get screened for health problems  Your healthcare provider will also give you advice on how to stay healthy  Write down your questions so you remember to ask them  Ask your healthcare provider how often you should have a wellness visit  What happens at a wellness visit:  Your healthcare provider will ask about your health, and your family history of health problems  This includes high blood pressure, heart disease, and cancer  He or she will ask if you have symptoms that concern you, if you smoke, and about your mood  You may also be asked about your intake of medicines, supplements, food, and alcohol  Any of the following may be done: Your weight  will be checked  Your height may also be checked so your body mass index (BMI) can be calculated  Your BMI shows if you are at a healthy weight  Your blood pressure  and heart rate will be checked  Your temperature may also be checked  Blood and urine tests  may be done  Blood tests may be done to check your cholesterol levels  Abnormal cholesterol levels increase your risk for heart disease and stroke  You may also need a blood or urine test to check for diabetes if you are at increased risk  Urine tests may be done to look for signs of an infection or kidney disease  A physical exam  includes checking your heartbeat and lungs with a stethoscope  Your healthcare provider may also check your skin to look for sun damage  Screening tests  may be recommended  A screening test is done to check for diseases that may not cause symptoms  The screening tests you may need depend on your age, gender, family history, and lifestyle habits  For example, colorectal screening may be recommended if you are 48years old or older  Screening tests you need if you are a woman:   A Pap smear  is used to screen for cervical cancer   Pap smears are usually done every 3 to 5 years depending on your age  You may need them more often if you have had abnormal Pap smear test results in the past  Ask your healthcare provider how often you should have a Pap smear  A mammogram  is an x-ray of your breasts to screen for breast cancer  Experts recommend mammograms every 2 years starting at age 48 years  You may need a mammogram at age 52 years or younger if you have an increased risk for breast cancer  Talk to your healthcare provider about when you should start having mammograms and how often you need them  Vaccines you may need:   Get an influenza vaccine  every year  The influenza vaccine protects you from the flu  Several types of viruses cause the flu  The viruses change over time, so new vaccines are made each year  Get a tetanus-diphtheria (Td) booster vaccine  every 10 years  This vaccine protects you against tetanus and diphtheria  Tetanus is a severe infection that may cause painful muscle spasms and lockjaw  Diphtheria is a severe bacterial infection that causes a thick covering in the back of your mouth and throat  Get a human papillomavirus (HPV) vaccine  if you are female and aged 23 to 32 or male 23 to 24 and never received it  This vaccine protects you from HPV infection  HPV is the most common infection spread by sexual contact  HPV may also cause vaginal, penile, and anal cancers  Get a pneumococcal vaccine  if you are aged 72 years or older  The pneumococcal vaccine is an injection given to protect you from pneumococcal disease  Pneumococcal disease is an infection caused by pneumococcal bacteria  The infection may cause pneumonia, meningitis, or an ear infection  Get a shingles vaccine  if you are 60 or older, even if you have had shingles before  The shingles vaccine is an injection to protect you from the varicella-zoster virus  This is the same virus that causes chickenpox   Shingles is a painful rash that develops in people who had chickenpox or have been exposed to the virus  How to eat healthy:  My Plate is a model for planning healthy meals  It shows the types and amounts of foods that should go on your plate  Fruits and vegetables make up about half of your plate, and grains and protein make up the other half  A serving of dairy is included on the side of your plate  The amount of calories and serving sizes you need depends on your age, gender, weight, and height  Examples of healthy foods are listed below:  Eat a variety of vegetables  such as dark green, red, and orange vegetables  You can also include canned vegetables low in sodium (salt) and frozen vegetables without added butter or sauces  Eat a variety of fresh fruits , canned fruit in 100% juice, frozen fruit, and dried fruit  Include whole grains  At least half of the grains you eat should be whole grains  Examples include whole-wheat bread, wheat pasta, brown rice, and whole-grain cereals such as oatmeal     Eat a variety of protein foods such as seafood (fish and shellfish), lean meat, and poultry without skin (turkey and chicken)  Examples of lean meats include pork leg, shoulder, or tenderloin, and beef round, sirloin, tenderloin, and extra lean ground beef  Other protein foods include eggs and egg substitutes, beans, peas, soy products, nuts, and seeds  Choose low-fat dairy products such as skim or 1% milk or low-fat yogurt, cheese, and cottage cheese  Limit unhealthy fats  such as butter, hard margarine, and shortening  Exercise:  Exercise at least 30 minutes per day on most days of the week  Some examples of exercise include walking, biking, dancing, and swimming  You can also fit in more physical activity by taking the stairs instead of the elevator or parking farther away from stores  Include muscle strengthening activities 2 days each week  Regular exercise provides many health benefits   It helps you manage your weight, and decreases your risk for type 2 diabetes, heart disease, stroke, and high blood pressure  Exercise can also help improve your mood  Ask your healthcare provider about the best exercise plan for you  General health and safety guidelines:   Do not smoke  Nicotine and other chemicals in cigarettes and cigars can cause lung damage  Ask your healthcare provider for information if you currently smoke and need help to quit  E-cigarettes or smokeless tobacco still contain nicotine  Talk to your healthcare provider before you use these products  Limit alcohol  A drink of alcohol is 12 ounces of beer, 5 ounces of wine, or 1½ ounces of liquor  Lose weight, if needed  Being overweight increases your risk of certain health conditions  These include heart disease, high blood pressure, type 2 diabetes, and certain types of cancer  Protect your skin  Do not sunbathe or use tanning beds  Use sunscreen with a SPF 15 or higher  Apply sunscreen at least 15 minutes before you go outside  Reapply sunscreen every 2 hours  Wear protective clothing, hats, and sunglasses when you are outside  Drive safely  Always wear your seatbelt  Make sure everyone in your car wears a seatbelt  A seatbelt can save your life if you are in an accident  Do not use your cell phone when you are driving  This could distract you and cause an accident  Pull over if you need to make a call or send a text message  Practice safe sex  Use latex condoms if are sexually active and have more than one partner  Your healthcare provider may recommend screening tests for sexually transmitted infections (STIs)  Wear helmets, lifejackets, and protective gear  Always wear a helmet when you ride a bike or motorcycle, go skiing, or play sports that could cause a head injury  Wear protective equipment when you play sports  Wear a lifejacket when you are on a boat or doing water sports      © Copyright [employer] 2022 Information is for End User's use only and may not be sold, redistributed or otherwise used for commercial purposes  All illustrations and images included in CareNotes® are the copyrighted property of A D A M [employer] Inc  or [employer]  The above information is an  only  It is not intended as medical advice for individual conditions or treatments  Talk to your doctor, nurse or pharmacist before following any medical regimen to see if it is safe and effective for you  Medicare Preventive Visit Patient Instructions  Thank you for completing your Welcome to Medicare Visit or Medicare Annual Wellness Visit today  Your next wellness visit will be due in one year (6/23/2023)  The screening/preventive services that you may require over the next 5-10 years are detailed below  Some tests may not apply to you based off risk factors and/or age  Screening tests ordered at today's visit but not completed yet may show as past due  Also, please note that scanned in results may not display below  Preventive Screenings:  Service Recommendations Previous Testing/Comments   Colorectal Cancer Screening  * Colonoscopy    * Fecal Occult Blood Test (FOBT)/Fecal Immunochemical Test (FIT)  * Fecal DNA/Cologuard Test  * Flexible Sigmoidoscopy Age: 54-65 years old   Colonoscopy: every 10 years (may be performed more frequently if at higher risk)  OR  FOBT/FIT: every 1 year  OR  Cologuard: every 3 years  OR  Sigmoidoscopy: every 5 years  Screening may be recommended earlier than age 48 if at higher risk for colorectal cancer  Also, an individualized decision between you and your healthcare provider will decide whether screening between the ages of 74-80 would be appropriate   Colonoscopy: Not on file  FOBT/FIT: Not on file  Cologuard: Not on file  Sigmoidoscopy: Not on file          Breast Cancer Screening Age: 36 years old  Frequency: every 1-2 years  Not required if history of left and right mastectomy Mammogram: Not on file        Cervical Cancer Screening Between the ages of 21-29, pap smear recommended once every 3 years  Between the ages of 33-67, can perform pap smear with HPV co-testing every 5 years  Recommendations may differ for women with a history of total hysterectomy, cervical cancer, or abnormal pap smears in past  Pap Smear: Not on file        Hepatitis C Screening Once for adults born between 1945 and 1965  More frequently in patients at high risk for Hepatitis C Hep C Antibody: Not on file        Diabetes Screening 1-2 times per year if you're at risk for diabetes or have pre-diabetes Fasting glucose: 161 mg/dL   A1C: 7 8 %        Cholesterol Screening Once every 5 years if you don't have a lipid disorder  May order more often based on risk factors  Lipid panel: 05/03/2022          Other Preventive Screenings Covered by Medicare:  Abdominal Aortic Aneurysm (AAA) Screening: covered once if your at risk  You're considered to be at risk if you have a family history of AAA  Lung Cancer Screening: covers low dose CT scan once per year if you meet all of the following conditions: (1) Age 50-69; (2) No signs or symptoms of lung cancer; (3) Current smoker or have quit smoking within the last 15 years; (4) You have a tobacco smoking history of at least 30 pack years (packs per day multiplied by number of years you smoked); (5) You get a written order from a healthcare provider    Glaucoma Screening: covered annually if you're considered high risk: (1) You have diabetes OR (2) Family history of glaucoma OR (3)  aged 48 and older OR (3)  American aged 72 and older  Osteoporosis Screening: covered every 2 years if you meet one of the following conditions: (1) You're estrogen deficient and at risk for osteoporosis based off medical history and other findings; (2) Have a vertebral abnormality; (3) On glucocorticoid therapy for more than 3 months; (4) Have primary hyperparathyroidism; (5) On osteoporosis medications and need to assess response to drug therapy  Last bone density test (DXA Scan): Not on file  HIV Screening: covered annually if you're between the age of 12-76  Also covered annually if you are younger than 13 and older than 72 with risk factors for HIV infection  For pregnant patients, it is covered up to 3 times per pregnancy  Immunizations:  Immunization Recommendations   Influenza Vaccine Annual influenza vaccination during flu season is recommended for all persons aged >= 6 months who do not have contraindications   Pneumococcal Vaccine (Prevnar and Pneumovax)  * Prevnar = PCV13  * Pneumovax = PPSV23   Adults 25-60 years old: 1-3 doses may be recommended based on certain risk factors  Adults 72 years old: Prevnar (PCV13) vaccine recommended followed by Pneumovax (PPSV23) vaccine  If already received PPSV23 since turning 65, then PCV13 recommended at least one year after PPSV23 dose  Hepatitis B Vaccine 3 dose series if at intermediate or high risk (ex: diabetes, end stage renal disease, liver disease)   Tetanus (Td) Vaccine - COST NOT COVERED BY MEDICARE PART B Following completion of primary series, a booster dose should be given every 10 years to maintain immunity against tetanus  Td may also be given as tetanus wound prophylaxis  Tdap Vaccine - COST NOT COVERED BY MEDICARE PART B Recommended at least once for all adults  For pregnant patients, recommended with each pregnancy     Shingles Vaccine (Shingrix) - COST NOT COVERED BY MEDICARE PART B  2 shot series recommended in those aged 48 and above     Health Maintenance Due:      Topic Date Due    Hepatitis C Screening  Never done    Breast Cancer Screening: Mammogram  Never done    Colorectal Cancer Screening  Never done     Immunizations Due:      Topic Date Due    Pneumococcal Vaccine: 65+ Years (1 - PCV) Never done    DTaP,Tdap,and Td Vaccines (1 - Tdap) Never done    COVID-19 Vaccine (3 - Booster for Pfizer series) 08/29/2021    Influenza Vaccine (Season Ended) 09/01/2022 Advance Directives   What are advance directives? Advance directives are legal documents that state your wishes and plans for medical care  These plans are made ahead of time in case you lose your ability to make decisions for yourself  Advance directives can apply to any medical decision, such as the treatments you want, and if you want to donate organs  What are the types of advance directives? There are many types of advance directives, and each state has rules about how to use them  You may choose a combination of any of the following:  Living will: This is a written record of the treatment you want  You can also choose which treatments you do not want, which to limit, and which to stop at a certain time  This includes surgery, medicine, IV fluid, and tube feedings  [facility] power of  for healthcare [facility]): This is a written record that states who you want to make healthcare choices for you when you are unable to make them for yourself  This person, called a proxy, is usually a family member or a friend  You may choose more than 1 proxy  Do not resuscitate (DNR) order:  A DNR order is used in case your heart stops beating or you stop breathing  It is a request not to have certain forms of treatment, such as CPR  A DNR order may be included in other types of advance directives  Medical directive: This covers the care that you want if you are in a coma, near death, or unable to make decisions for yourself  You can list the treatments you want for each condition  Treatment may include pain medicine, surgery, blood transfusions, dialysis, IV or tube feedings, and a ventilator (breathing machine)  Values history: This document has questions about your views, beliefs, and how you feel and think about life  This information can help others choose the care that you would choose  Why are advance directives important? An advance directive helps you control your care   Although spoken wishes may be used, it is better to have your wishes written down  Spoken wishes can be misunderstood, or not followed  Treatments may be given even if you do not want them  An advance directive may make it easier for your family to make difficult choices about your care  Weight Management   Why it is important to manage your weight:  Being overweight increases your risk of health conditions such as heart disease, high blood pressure, type 2 diabetes, and certain types of cancer  It can also increase your risk for osteoarthritis, sleep apnea, and other respiratory problems  Aim for a slow, steady weight loss  Even a small amount of weight loss can lower your risk of health problems  How to lose weight safely:  A safe and healthy way to lose weight is to eat fewer calories and get regular exercise  You can lose up about 1 pound a week by decreasing the number of calories you eat by 500 calories each day  Healthy meal plan for weight management:  A healthy meal plan includes a variety of foods, contains fewer calories, and helps you stay healthy  A healthy meal plan includes the following:  Eat whole-grain foods more often  A healthy meal plan should contain fiber  Fiber is the part of grains, fruits, and vegetables that is not broken down by your body  Whole-grain foods are healthy and provide extra fiber in your diet  Some examples of whole-grain foods are whole-wheat breads and pastas, oatmeal, brown rice, and bulgur  Eat a variety of vegetables every day  Include dark, leafy greens such as spinach, kale, yonathan greens, and mustard greens  Eat yellow and orange vegetables such as carrots, sweet potatoes, and winter squash  Eat a variety of fruits every day  Choose fresh or canned fruit (canned in its own juice or light syrup) instead of juice  Fruit juice has very little or no fiber  Eat low-fat dairy foods  Drink fat-free (skim) milk or 1% milk  Eat fat-free yogurt and low-fat cottage cheese   Try low-fat cheeses such as mozzarella and other reduced-fat cheeses  Choose meat and other protein foods that are low in fat  Choose beans or other legumes such as split peas or lentils  Choose fish, skinless poultry (chicken or turkey), or lean cuts of red meat (beef or pork)  Before you cook meat or poultry, cut off any visible fat  Use less fat and oil  Try baking foods instead of frying them  Add less fat, such as margarine, sour cream, regular salad dressing and mayonnaise to foods  Eat fewer high-fat foods  Some examples of high-fat foods include french fries, doughnuts, ice cream, and cakes  Eat fewer sweets  Limit foods and drinks that are high in sugar  This includes candy, cookies, regular soda, and sweetened drinks  Exercise:  Exercise at least 30 minutes per day on most days of the week  Some examples of exercise include walking, biking, dancing, and swimming  You can also fit in more physical activity by taking the stairs instead of the elevator or parking farther away from stores  Ask your healthcare provider about the best exercise plan for you  © Copyright [employer] 2018 Information is for End User's use only and may not be sold, redistributed or otherwise used for commercial purposes  All illustrations and images included in CareNotes® are the copyrighted property of [employer]  or [facility] Preventive Visit Patient Instructions  Thank you for completing your Welcome to Medicare Visit or Medicare Annual Wellness Visit today  Your next wellness visit will be due in one year (6/23/2023)  The screening/preventive services that you may require over the next 5-10 years are detailed below  Some tests may not apply to you based off risk factors and/or age  Screening tests ordered at today's visit but not completed yet may show as past due  Also, please note that scanned in results may not display below    Preventive Screenings:  Service Recommendations Previous Testing/Comments   Colorectal Cancer Screening  * Colonoscopy    * Fecal Occult Blood Test (FOBT)/Fecal Immunochemical Test (FIT)  * Fecal DNA/Cologuard Test  * Flexible Sigmoidoscopy Age: 54-65 years old   Colonoscopy: every 10 years (may be performed more frequently if at higher risk)  OR  FOBT/FIT: every 1 year  OR  Cologuard: every 3 years  OR  Sigmoidoscopy: every 5 years  Screening may be recommended earlier than age 48 if at higher risk for colorectal cancer  Also, an individualized decision between you and your healthcare provider will decide whether screening between the ages of 74-80 would be appropriate  Colonoscopy: Not on file  FOBT/FIT: Not on file  Cologuard: Not on file  Sigmoidoscopy: Not on file    Risks and Benefits Discussed  Due for Colonoscopy - Low Risk     Breast Cancer Screening Age: 36 years old  Frequency: every 1-2 years  Not required if history of left and right mastectomy Mammogram: Not on file    Risks and Benefits Discussed  Due for Mammogram   Cervical Cancer Screening Between the ages of 21-29, pap smear recommended once every 3 years  Between the ages of 33-67, can perform pap smear with HPV co-testing every 5 years  Recommendations may differ for women with a history of total hysterectomy, cervical cancer, or abnormal pap smears in past  Pap Smear: Not on file    Screening Not Indicated   Hepatitis C Screening Once for adults born between 1945 and 1965  More frequently in patients at high risk for Hepatitis C Hep C Antibody: Not on file    Risks and Benefits Discussed  Due for Hepatitis C screening   Diabetes Screening 1-2 times per year if you're at risk for diabetes or have pre-diabetes Fasting glucose: 161 mg/dL   A1C: 7 8 %    History Diabetes  Screening Current   Cholesterol Screening Once every 5 years if you don't have a lipid disorder  May order more often based on risk factors   Lipid panel: 05/03/2022    Screening Current     Other Preventive Screenings Covered by Medicare:  Abdominal Aortic Aneurysm (AAA) Screening: covered once if your at risk  You're considered to be at risk if you have a family history of AAA  Lung Cancer Screening: covers low dose CT scan once per year if you meet all of the following conditions: (1) Age 50-69; (2) No signs or symptoms of lung cancer; (3) Current smoker or have quit smoking within the last 15 years; (4) You have a tobacco smoking history of at least 30 pack years (packs per day multiplied by number of years you smoked); (5) You get a written order from a healthcare provider  Glaucoma Screening: covered annually if you're considered high risk: (1) You have diabetes OR (2) Family history of glaucoma OR (3)  aged 48 and older OR (3)  American aged 72 and older  Osteoporosis Screening: covered every 2 years if you meet one of the following conditions: (1) You're estrogen deficient and at risk for osteoporosis based off medical history and other findings; (2) Have a vertebral abnormality; (3) On glucocorticoid therapy for more than 3 months; (4) Have primary hyperparathyroidism; (5) On osteoporosis medications and need to assess response to drug therapy  Last bone density test (DXA Scan): Not on file  HIV Screening: covered annually if you're between the age of 12-76  Also covered annually if you are younger than 13 and older than 72 with risk factors for HIV infection  For pregnant patients, it is covered up to 3 times per pregnancy  Immunizations:  Immunization Recommendations   Influenza Vaccine Annual influenza vaccination during flu season is recommended for all persons aged >= 6 months who do not have contraindications   Pneumococcal Vaccine (Prevnar and Pneumovax)  * Prevnar = PCV13  * Pneumovax = PPSV23   Adults 25-60 years old: 1-3 doses may be recommended based on certain risk factors  Adults 72 years old: Prevnar (PCV13) vaccine recommended followed by Pneumovax (PPSV23) vaccine   If already received PPSV23 since turning 65, then PCV13 recommended at least one year after PPSV23 dose  Hepatitis B Vaccine 3 dose series if at intermediate or high risk (ex: diabetes, end stage renal disease, liver disease)   Tetanus (Td) Vaccine - COST NOT COVERED BY MEDICARE PART B Following completion of primary series, a booster dose should be given every 10 years to maintain immunity against tetanus  Td may also be given as tetanus wound prophylaxis  Tdap Vaccine - COST NOT COVERED BY MEDICARE PART B Recommended at least once for all adults  For pregnant patients, recommended with each pregnancy  Shingles Vaccine (Shingrix) - COST NOT COVERED BY MEDICARE PART B  2 shot series recommended in those aged 48 and above     Health Maintenance Due:      Topic Date Due    Hepatitis C Screening  Never done    Breast Cancer Screening: Mammogram  Never done    Colorectal Cancer Screening  Never done     Immunizations Due:      Topic Date Due    Pneumococcal Vaccine: 65+ Years (1 - PCV) Never done    DTaP,Tdap,and Td Vaccines (1 - Tdap) Never done    COVID-19 Vaccine (3 - Booster for Pfizer series) 08/29/2021    Influenza Vaccine (Season Ended) 09/01/2022     Advance Directives   What are advance directives? Advance directives are legal documents that state your wishes and plans for medical care  These plans are made ahead of time in case you lose your ability to make decisions for yourself  Advance directives can apply to any medical decision, such as the treatments you want, and if you want to donate organs  What are the types of advance directives? There are many types of advance directives, and each state has rules about how to use them  You may choose a combination of any of the following:  Living will: This is a written record of the treatment you want  You can also choose which treatments you do not want, which to limit, and which to stop at a certain time  This includes surgery, medicine, IV fluid, and tube feedings  Durable power of  for healthcare [facility]): This is a written record that states who you want to make healthcare choices for you when you are unable to make them for yourself  This person, called a proxy, is usually a family member or a friend  You may choose more than 1 proxy  Do not resuscitate (DNR) order:  A DNR order is used in case your heart stops beating or you stop breathing  It is a request not to have certain forms of treatment, such as CPR  A DNR order may be included in other types of advance directives  Medical directive: This covers the care that you want if you are in a coma, near death, or unable to make decisions for yourself  You can list the treatments you want for each condition  Treatment may include pain medicine, surgery, blood transfusions, dialysis, IV or tube feedings, and a ventilator (breathing machine)  Values history: This document has questions about your views, beliefs, and how you feel and think about life  This information can help others choose the care that you would choose  Why are advance directives important? An advance directive helps you control your care  Although spoken wishes may be used, it is better to have your wishes written down  Spoken wishes can be misunderstood, or not followed  Treatments may be given even if you do not want them  An advance directive may make it easier for your family to make difficult choices about your care  Weight Management   Why it is important to manage your weight:  Being overweight increases your risk of health conditions such as heart disease, high blood pressure, type 2 diabetes, and certain types of cancer  It can also increase your risk for osteoarthritis, sleep apnea, and other respiratory problems  Aim for a slow, steady weight loss  Even a small amount of weight loss can lower your risk of health problems    How to lose weight safely:  A safe and healthy way to lose weight is to eat fewer calories and get regular exercise  You can lose up about 1 pound a week by decreasing the number of calories you eat by 500 calories each day  Healthy meal plan for weight management:  A healthy meal plan includes a variety of foods, contains fewer calories, and helps you stay healthy  A healthy meal plan includes the following:  Eat whole-grain foods more often  A healthy meal plan should contain fiber  Fiber is the part of grains, fruits, and vegetables that is not broken down by your body  Whole-grain foods are healthy and provide extra fiber in your diet  Some examples of whole-grain foods are whole-wheat breads and pastas, oatmeal, brown rice, and bulgur  Eat a variety of vegetables every day  Include dark, leafy greens such as spinach, kale, yonathan greens, and mustard greens  Eat yellow and orange vegetables such as carrots, sweet potatoes, and winter squash  Eat a variety of fruits every day  Choose fresh or canned fruit (canned in its own juice or light syrup) instead of juice  Fruit juice has very little or no fiber  Eat low-fat dairy foods  Drink fat-free (skim) milk or 1% milk  Eat fat-free yogurt and low-fat cottage cheese  Try low-fat cheeses such as mozzarella and other reduced-fat cheeses  Choose meat and other protein foods that are low in fat  Choose beans or other legumes such as split peas or lentils  Choose fish, skinless poultry (chicken or turkey), or lean cuts of red meat (beef or pork)  Before you cook meat or poultry, cut off any visible fat  Use less fat and oil  Try baking foods instead of frying them  Add less fat, such as margarine, sour cream, regular salad dressing and mayonnaise to foods  Eat fewer high-fat foods  Some examples of high-fat foods include french fries, doughnuts, ice cream, and cakes  Eat fewer sweets  Limit foods and drinks that are high in sugar  This includes candy, cookies, regular soda, and sweetened drinks    Exercise:  Exercise at least 30 minutes per day on most days of the week  Some examples of exercise include walking, biking, dancing, and swimming  You can also fit in more physical activity by taking the stairs instead of the elevator or parking farther away from stores  Ask your healthcare provider about the best exercise plan for you  © Copyright [employer] 2018 Information is for End User's use only and may not be sold, redistributed or otherwise used for commercial purposes  All illustrations and images included in CareNotes® are the copyrighted property of [employer]  or [address] [place] [address] [facility]  Nirmatrelvir/Ritonavir (By mouth)   Nirmatrelvir (epl-PS-juxy-vir), Ritonavir (rwh-PH-wv-vir)  Treats coronavirus disease 2019 (COVID-19)  Brand Name(s):   There may be other brand names for this medicine  When This Medicine Should Not Be Used: This medicine is not right for everyone  Do not use it if you had an allergic reaction to nirmatrelvir or ritonavir  How to Use This Medicine:   Tablet  Your doctor will tell you how much medicine to use  Do not use more than directed  Take this medicine within 5 days of the onset of COVID-19 symptoms  Swallow the tablet whole  Do not crush, break, or chew it  This medicine comes with a Fact Sheet for Patients, Parents, and Caregivers  Read and follow the information in the Fact Sheet carefully  Ask your doctor if you have any questions  Missed dose: If it is within 8 hours of the time it is usually taken, take it as soon as possible, and then go back to your regular schedule  If it is more than 8 hours, skip the missed dose and take your next dose at the regular time  Do not use extra medicine to make up for a missed dose  Store the medicine in a closed container at room temperature, away from heat, moisture, and direct light  Drugs and Foods to Avoid:   Ask your doctor or pharmacist before using any other medicine, including over-the-counter medicines, vitamins, and herbal products    Do not use this medicine if you are also using alfuzosin, apalutamide, clozapine, colchicine, lovastatin, lurasidone, midazolam, pethidine, pimozide, piroxicam, propoxyphene, ranolazine, rifampin, salmeterol, sildenafil, simvastatin, El's wort, triazolam, ergot medicine (including dihydroergotamine, ergotamine, methylergonovine), medicine for heart rhythm problems (including amiodarone, dronedarone, flecainide, propafenone, quinidine), or seizure medicine (including carbamazepine, phenobarbital, phenytoin)  Some medicines can affect how nirmatrelvir/ritonavir works   Tell your doctor if you are using any of the following:  Atorvastatin, bedaquiline, bepridil, bosentan, cyclosporine, dasabuvir, digoxin, elbasvir/grazoprevir, fentanyl, glecaprevir/pibrentasvir, lidocaine, methadone, ombitasvir/paritaprevir/ritonavir, quetiapine, rifabutin, rosuvastatin, sirolimus, sofosbuvir/velpatasvir/voxilaprevir, tacrolimus  Birth control pills (including ethinyl estradiol)  Blood pressure medicine (including amlodipine, diltiazem, felodipine, nicardipine, nifedipine)  Blood thinner (including rivaroxaban, warfarin)  Medicine to treat cancer (including abemaciclib, ceritinib, dasatinib, encorafenib, ibrutinib, ivosidenib, neratinib, nilotinib, venetoclax, vinblastine, vincristine)  Medicine to treat depression (including bupropion, trazodone)  Medicine to treat HIV (including amprenavir, atazanavir, bictegravir, darunavir, delavirdine, didanosine, efavirenz, emtricitabine/tenofovir, fosamprenavir, indinavir, maraviroc, nelfinavir, raltegravir, saquinavir, tipranavir, tenofovir, zidovudine)  Medicine to treat infections (including clarithromycin, erythromycin, isavuconazonium, itraconazole, ketoconazole, voriconazole)  Steroid medicine (including betamethasone, budesonide, ciclesonide, dexamethasone, fluticasone, methylprednisolone, mometasone, prednisone, triamcinolone)  Warnings While Using This Medicine:   Tell your doctor if you are pregnant or planning to become pregnant  Birth control pills may not work as well to prevent pregnancy when used with this medicine  Use another form of birth control (including condoms or spermicide) along with your pills  Tell your doctor if you are breastfeeding, or if you have kidney disease, liver disease, or HIV infection  This medicine may cause liver problems  Your doctor will do lab tests at regular visits to check on the effects of this medicine  Keep all appointments  Keep all medicine out of the reach of children  Never share your medicine with anyone  Possible Side Effects While Using This Medicine: If you notice these less serious side effects, talk with your doctor: Allergic reaction: Itching or hives, swelling in your face or hands, swelling or tingling in your mouth or throat, chest tightness, trouble breathing  Blurred vision, severe headache, slow or fast heartbeat, lightheadedness, dizziness  Dark urine or pale stools, nausea, vomiting, loss of appetite, stomach pain, yellow skin or eyes  If you notice these less serious side effects, talk with your doctor:  Change or loss of taste  Diarrhea  Muscle pain  If you notice other side effects that you think are caused by this medicine, tell your doctor  Call your doctor for medical advice about side effects  You may report side effects to FDA at 1-603-FDA-0141    © Copyright [employer] 2022 Information is for End User's use only and may not be sold, redistributed or otherwise used for commercial purposes  The above information is an  only  It is not intended as medical advice for individual conditions or treatments  Talk to your doctor, nurse or pharmacist before following any medical regimen to see if it is safe and effective for you

## 2022-06-22 NOTE — PROGRESS NOTES
Assessment and Plan:     Problem List Items Addressed This Visit        Endocrine    Type 2 diabetes mellitus with kidney complication, without long-term current use of insulin (HCC)    Relevant Medications    metFORMIN (GLUCOPHAGE-XR) 750 mg 24 hr tablet    Other Relevant Orders    Ambulatory referral to Diabetic Education       Cardiovascular and Mediastinum    Coronary artery disease involving native coronary artery of native heart without angina pectoris    Relevant Medications    metFORMIN (GLUCOPHAGE-XR) 750 mg 24 hr tablet       Other    Dyslipidemia      Other Visit Diagnoses     COVID-19    -  Primary    Advsied not to take lipitor while on paxlovid    Relevant Medications    nirmatrelvir & ritonavir (Paxlovid) tablet therapy pack    Encounter for screening mammogram for malignant neoplasm of breast        Relevant Orders    Mammo screening bilateral w 3d & cad    Need for hepatitis C screening test        Relevant Orders    Hepatitis C antibody    Screening for colorectal cancer        Relevant Orders    Ambulatory referral for colonoscopy    Medicare annual wellness visit, initial            1  COVID-19  Comments:  Advsied not to take lipitor while on paxlovid  Orders:  -     nirmatrelvir & ritonavir (Paxlovid) tablet therapy pack; Take 3 tablets by mouth 2 (two) times a day for 5 days Take 2 nirmatrelvir tablets + 1 ritonavir tablet together per dose    2  Coronary artery disease involving native coronary artery of native heart without angina pectoris  Comments:  managed by cardiologist  Orders:  -     metFORMIN (GLUCOPHAGE-XR) 750 mg 24 hr tablet; Take 1 tablet (750 mg total) by mouth daily with breakfast    3  Encounter for screening mammogram for malignant neoplasm of breast  -     Mammo screening bilateral w 3d & cad; Future; Expected date: 06/22/2022    4  Need for hepatitis C screening test  -     Hepatitis C antibody; Future    5   Screening for colorectal cancer  -     Ambulatory referral for colonoscopy; Future    6  Type 2 diabetes mellitus with stage 2 chronic kidney disease, without long-term current use of insulin ([facility])  Comments:  did inform that her GFR in stage 2 CKD range based on previous blood work  Orders:  -     Ambulatory referral to Diabetic Education; Future    7  Medicare annual wellness visit, initial    8  Dyslipidemia  Comments:  complaint with statin and tolerating it well      Vitamin D, vitamin C and Zinc supplementation discussed with patient  Advised that has to quarantine for 5 days from symptom onset and as long as fever free for 24 hours without any medication after 5 days then can go to work and be around people just has to wear mask for total 10 days from day of symptom onset  Preventive health issues were discussed with patient, and age appropriate screening tests were ordered as noted in patient's After Visit Summary  Personalized health advice and appropriate referrals for health education or preventive services given if needed, as noted in patient's After Visit Summary  History of Present Illness:     Patient presents for a Medicare Wellness Visit  Patient tested positive for covid-19 on 6/21/2022 with home test as was exposed at wedding  Patient started with symptoms on 6/20/2022  Fully vaccinated with Covid-19 without any booster  Have not been to primary physician from years  Under care of cardiologist and was managing her diabetes too  uptodate on blood work for HbA1c, CMP and lipid profile  Never had colonoscopy and last mammogram was about more than 5 years  Diabetes is not well controlled and will follow in office and will discuss different medication options next week  Metformin current dosage filled      Fatigue  Associated symptoms include coughing and fatigue  Pertinent negatives include no abdominal pain, chills, congestion, diaphoresis, fever, headaches, nausea, sore throat or vomiting        No care team member to display     Review of Systems:     Review of Systems   Constitutional: Positive for fatigue  Negative for chills, diaphoresis, fever and unexpected weight change  HENT: Negative for congestion, dental problem, drooling, ear discharge, ear pain, facial swelling, hearing loss, mouth sores, nosebleeds, postnasal drip, rhinorrhea, sinus pressure, sinus pain, sneezing, sore throat, tinnitus, trouble swallowing and voice change  Respiratory: Positive for cough  Negative for chest tightness, shortness of breath and wheezing  Cardiovascular: Negative  Gastrointestinal: Negative for abdominal pain, constipation, diarrhea, nausea and vomiting  Skin: Negative  Neurological: Negative for dizziness, light-headedness and headaches          Problem List:     Patient Active Problem List   Diagnosis    Coronary artery disease involving native coronary artery of native heart without angina pectoris    Dyslipidemia    Type 2 diabetes mellitus with kidney complication, without long-term current use of insulin ([place] [address] )    Primary hypertension    Angina of effort ([place] [address] )    Dyspnea on exertion    History of non-ST elevation myocardial infarction (NSTEMI)    Metabolic syndrome    Myocardial infarction [facility])      Past Medical and Surgical History:     Past Medical History:   Diagnosis Date    GERD (gastroesophageal reflux disease)     Heart attack ([facility] )     High cholesterol     Hypertension      Past Surgical History:   Procedure Laterality Date    CARPAL TUNNEL RELEASE      CORONARY ANGIOPLASTY WITH STENT PLACEMENT        Family History:     Family History   Problem Relation Age of Onset    Diabetes Mother     Arthritis Mother       Social History:     Social History     Socioeconomic History    Marital status: /Civil Union     Spouse name: None    Number of children: None    Years of education: None    Highest education level: None   Occupational History    None   Tobacco Use    Smoking status: Former Smoker Packs/day: 1 00    Smokeless tobacco: Former User     Quit date: 03/2012   Vaping Use    Vaping Use: Never used   Substance and Sexual Activity    Alcohol use: None    Drug use: None    Sexual activity: None   Other Topics Concern    None   Social History Narrative    None     Social Determinants of Health     Financial Resource Strain: Not on file   Food Insecurity: Not on file   Transportation Needs: Not on file   Physical Activity: Not on file   Stress: Not on file   Social Connections: Not on file   Intimate Partner Violence: Not on file   Housing Stability: Not on file      Medications and Allergies:     Current Outpatient Medications   Medication Sig Dispense Refill    ASPIRIN 81 PO Take 81 mg by mouth      atorvastatin (LIPITOR) 80 mg tablet Take 1 tablet (80 mg total) by mouth daily 90 tablet 1    Calcium Carb-Cholecalciferol 600-800 MG-UNIT CHEW Chew 1 tablet daily      carvedilol (COREG) 12 5 mg tablet Take 1 tablet (12 5 mg total) by mouth in the morning and 1 tablet (12 5 mg total) in the evening  Take with meals  180 tablet 3    cetirizine (ZyrTEC) 10 mg tablet Take 10 mg by mouth      esomeprazole (NexIUM) 40 MG capsule Take 40 mg by mouth daily      metFORMIN (GLUCOPHAGE-XR) 750 mg 24 hr tablet Take 1 tablet (750 mg total) by mouth daily with breakfast 30 tablet 0    nirmatrelvir & ritonavir (Paxlovid) tablet therapy pack Take 3 tablets by mouth 2 (two) times a day for 5 days Take 2 nirmatrelvir tablets + 1 ritonavir tablet together per dose 30 tablet 0    ramipril (ALTACE) 10 MG capsule Take 1 capsule (10 mg total) by mouth in the morning  90 capsule 3    nitroglycerin (NITROSTAT) 0 4 mg SL tablet Place 0 4 mg under the tongue (Patient not taking: No sig reported)       No current facility-administered medications for this visit       Allergies   Allergen Reactions    Prasugrel Rash     effient      Immunizations:     Immunization History   Administered Date(s) Administered   Gal Mena COVID-19 PFIZER VACCINE 0 3 ML IM 03/08/2021, 03/29/2021      Health Maintenance:         Topic Date Due    Hepatitis C Screening  Never done    Breast Cancer Screening: Mammogram  Never done    Colorectal Cancer Screening  Never done         Topic Date Due    Pneumococcal Vaccine: 65+ Years (1 - PCV) Never done    DTaP,Tdap,and Td Vaccines (1 - Tdap) Never done    COVID-19 Vaccine (3 - Booster for Pfizer series) 08/29/2021    Influenza Vaccine (Season Ended) 09/01/2022      Medicare Screening Tests and Risk Assessments:     Van Cornelius is here for her Initial Wellness visit  Health Risk Assessment:   Patient rates overall health as fair  Patient feels that their physical health rating is same  Patient is satisfied with their life  Eyesight was rated as same  Hearing was rated as same  Patient feels that their emotional and mental health rating is same  Patients states they are never, rarely angry  Patient states they are sometimes unusually tired/fatigued  Pain experienced in the last 7 days has been none  Patient states that she has experienced no weight loss or gain in last 6 months  Depression Screening:   PHQ-2 Score: 0      Fall Risk Screening: In the past year, patient has experienced: no history of falling in past year      Urinary Incontinence Screening:   Patient has not leaked urine accidently in the last six months  Home Safety:  Patient does not have trouble with stairs inside or outside of their home  Patient has working smoke alarms and has working carbon monoxide detector  Home safety hazards include: none  Nutrition:   Current diet is Regular and Limited junk food  Medications:   Patient is not currently taking any over-the-counter supplements  Patient is able to manage medications       Activities of Daily Living (ADLs)/Instrumental Activities of Daily Living (IADLs):   Walk and transfer into and out of bed and chair?: Yes  Dress and groom yourself?: Yes    Bathe or shower yourself?: Yes    Feed yourself? Yes  Do your laundry/housekeeping?: Yes  Manage your money, pay your bills and track your expenses?: Yes  Make your own meals?: Yes    Do your own shopping?: Yes    Previous Hospitalizations:   Any hospitalizations or ED visits within the last 12 months?: No      Advance Care Planning:   Living will: Yes    Durable POA for healthcare:  Yes    Advanced directive: Yes    Advanced directive counseling given: Yes    Patient declined ACP directive: No      Cognitive Screening:   Provider or family/friend/caregiver concerned regarding cognition?: No    PREVENTIVE SCREENINGS      Cardiovascular Screening:    General: Screening Current      Diabetes Screening:     General: History Diabetes and Screening Current      Colorectal Cancer Screening:     General: Risks and Benefits Discussed    Due for: Colonoscopy - Low Risk      Breast Cancer Screening:     General: Risks and Benefits Discussed    Due for: Mammogram        Cervical Cancer Screening:    General: Screening Not Indicated      Osteoporosis Screening:    General: Risks and Benefits Discussed and Patient Declines      Abdominal Aortic Aneurysm (AAA) Screening:        General: Screening Not Indicated      Lung Cancer Screening:     General: Screening Not Indicated      Hepatitis C Screening:    General: Risks and Benefits Discussed    Hep C Screening Accepted: Yes        Preventive Screening Comments: Quit smoking about 10 years ago and smoked for 20 years but less than pack a year    Screening, Brief Intervention, and Referral to Treatment (SBIRT)    Screening      AUDIT-C Screenin) How often did you have a drink containing alcohol in the past year? never  2) How many drinks did you have on a typical day when you were drinking in the past year? 0  3) How often did you have 6 or more drinks on one occasion in the past year? never    AUDIT-C Score: 0  Interpretation: Score 0-2 (female): Negative screen for alcohol misuse    Single Item Drug Screening:  How often have you used an illegal drug (including marijuana) or a prescription medication for non-medical reasons in the past year? never    Single Item Drug Screen Score: 0  Interpretation: Negative screen for possible drug use disorder    Brief Intervention  Alcohol & drug use screenings were reviewed  No concerns regarding substance use disorder identified  Other Counseling Topics:   Car/seat belt/driving safety, skin self-exam, sunscreen and calcium and vitamin D intake and regular weightbearing exercise  No exam data present     Physical Exam:     There were no vitals taken for this visit  Physical Exam  HENT:      Head: Normocephalic  Right Ear: External ear normal       Left Ear: External ear normal       Nose: Nose normal    Eyes:      Conjunctiva/sclera: Conjunctivae normal    Pulmonary:      Effort: Pulmonary effort is normal       Comments: No cough and distress noted on video call  Musculoskeletal:      Cervical back: Normal range of motion  Skin:     Comments: No diaphoresis noted on video call   Neurological:      Mental Status: She is alert and oriented to person, place, and time  Psychiatric:         Mood and Affect: Mood normal          Behavior: Behavior normal          Thought Content: Thought content normal          Judgment: Judgment normal         PAXLOVID is an investigational medicine used to treat mild-to-moderate COVID-19 in adults and children [15years of age and older weighing at least 80 pounds (36 kg)] with positive results of direct SARS-CoV-2 viral testing, and who are at high risk for progression to severe COVID-19, including hospitalization or death  PAXLOVID is investigational because it is still being studied  There is limited information about the safety and effectiveness of using PAXLOVID to treat people with mild-to-moderate COVID-19      The FDA has authorized the emergency use of PAXLOVID for the treatment of mild-tomoderate COVID-19 in adults and children [15years of age and older weighing at least 80 pounds (36 kg)] with a positive test for the virus that causes COVID-19, and who are at high risk for progression to severe COVID-19, including hospitalization or death, under an EUA  What should I tell my healthcare provider before I take PAXLOVID? Tell your healthcare provider if you:  Have any allergies  Have liver or kidney disease  Are pregnant or plan to become pregnant  Are breastfeeding a child  ave any serious illnesses    Tell your healthcare provider about all the medicines you take, including prescription and over-the-counter medicines, vitamins, and herbal supplements  Some medicines may interact with PAXLOVID and may cause serious side effects  Keep a list of your medicines to show your healthcare provider and pharmacist when you get a new medicine  You can ask your healthcare provider or pharmacist for a list of medicines that interact with PAXLOVID  Do not start taking a new medicine without telling your healthcare provider  Your healthcare provider can tell you if it is safe to take PAXLOVID with other medicines  Tell your healthcare provider if you are taking combined hormonal contraceptive  PAXLOVID may affect how your birth control pills work  Females who are able to become pregnant should use another effective alternative form of contraception or an additional barrier method of contraception  Talk to your healthcare provider if you have any questions about contraceptive methods that might be right for you  How do I take PAXLOVID? PAXLOVID consists of 2 medicines: nirmatrelvir and ritonavir  Take 2 pink tablets of nirmatrelvir with 1 white tablet of ritonavir by mouth 2 times each day (in the morning and in the evening) for 5 days  For each dose, take all 3 tablets at the same time  If you have kidney disease, talk to your healthcare provider  You may need a different dose  Swallow the tablets whole   Do not chew, break, or crush the tablets  Take PAXLOVID with or without food  Do not stop taking PAXLOVID without talking to your healthcare provider, even if you feel better  If you miss a dose of PAXLOVID within 8 hours of the time it is usually taken, take it as soon as you remember  If you miss a dose by more than 8 hours, skip the missed dose and take the next dose at your regular time  Do not take 2 doses of PAXLOVID at the same time  If you take too much PAXLOVID, call your healthcare provider or go to the nearest hospital emergency room right away  If you are taking a ritonavir- or cobicistat-containing medicine to treat hepatitis C or Human Immunodeficiency Virus (HIV), you should continue to take your medicine as prescribed by your healthcare provider  Talk to your healthcare provider if you do not feel better or if you feel worse after 5 days  Who should generally not take PAXLOVID? Do not take PAXLOVID if:  You are allergic to nirmatrelvir, ritonavir, or any of the ingredients in PAXLOVID  You are taking any of the following medicines:  Alfuzosin  Pethidine, piroxicam, propoxyphene  Ranolazine  Amiodarone, dronedarone, flecainide, propafenone, quinidine  Colchicine  Lurasidone, pimozide, clozapine  Dihydroergotamine, ergotamine, methylergonovine  Lovastatin, simvastatin  Sildenafil (Revatio®) for pulmonary arterial hypertension (PAH)  Triazolam, oral midazolam  Apalutamide  Carbamazepine, phenobarbital, phenytoin  Rifampin  St  Elvins Wort (hypericum perforatum)    What are the important possible side effects of PAXLOVID? Possible side effects of PAXLOVID are:  Liver Problems  Tell your healthcare provider right away if you have any of these signs and symptoms of liver problems: loss of appetite, yellowing of your skin and the whites of eyes (jaundice), dark-colored urine, pale colored stools and itchy skin, stomach area (abdominal) pain  Resistance to HIV Medicines   If you have untreated HIV infection, PAXLOVID may lead to some HIV medicines not working as well in the future  Other possible side effects include: altered sense of taste, diarrhea, high blood pressure, or muscle aches    These are not all the possible side effects of PAXLOVID  Not many people have taken PAXLOVID  Serious and unexpected side effects may happen  Florien [place] is still being studied, so it is possible that all of the risks are not known at this time  What other treatment choices are there? Like Adaline Esther may allow for the emergency use of other medicines to treat people with COVID-19  Go to https://[employer]/ for information on the emergency use of other medicines that are authorized by FDA to treat people with COVID-19  Your healthcare provider may talk with you about clinical trials for which you may be eligible  It is your choice to be treated or not to be treated with PAXLOVID  Should you decide not to receive it or for your child not to receive it, it will not change your standard medical care  What if I am pregnant or breastfeeding? There is no experience treating pregnant women or breastfeeding mothers with PAXLOVID  For a mother and unborn baby, the benefit of taking PAXLOVID may be greater than the risk from the treatment  If you are pregnant, discuss your options and specific situation with your healthcare provider  It is recommended that you use effective barrier contraception or do not have sexual activity while taking PAXLOVID  If you are breastfeeding, discuss your options and specific situation with your healthcare provider  How do I report side effects with PAXLOVID? Contact your healthcare provider if you have any side effects that bother you or do not go away      Report side effects to FDA MedWatch at www fda gov/medwatch or call 0-692-RWY3997 or you can report side effects to [employer]  at the contact information provided below  Website Fax number Telephone number   [employer] [phone]     How should I store [address]? Store PAXLOVID tablets at room temperature between 68°F to 77°F (20°C to 25°C)  Full fact sheet for patientsEntrepreneuClary co za: [facility], [facility] [address] Consent    Verification of patient location:    Patient is located in the [facility] in which I hold an active license [address] [place] [address]    Reason for visit is covid-19 positive    Encounter provider DAVID Story    Provider located at [address]  [id number] [facility] [address]  [place] [address]      Recent Visits  No visits were found meeting these conditions  Showing recent visits within past 7 days and meeting all other requirements  Today's Visits  Date Type Provider Dept   06/22/22 Telemedicine Ben Story today's visits and meeting all other requirements  Future Appointments  No visits were found meeting these conditions  Showing future appointments within next 150 days and meeting all other requirements       After connecting through [employer], the patient was identified by name and date of birth  Trisha Trent was informed that this is a telemedicine visit and that the visit is being conducted through [address] [place] [address] Now and patient was informed that this is a secure, HIPAA-compliant platform  She agrees to proceed  My office door was closed  No one else was in the room  She acknowledged consent and understanding of privacy and security of the video platform  Trisha Trent verbally agrees to participate in [employer]   Pt is aware that [employer] could be limited without vital signs or the ability to perform a full hands-on physical Jade Banerjee understands she or the provider may request at any time to terminate the video visit and request the patient to seek care or treatment in person  Patient is aware this is a billable service  Virtual AWV Consent    Verification of patient location:    Patient is located in the [facility] in which I hold an active license [place]    Reason for visit is covid-19 positive    Encounter provider DAVID Nettles    Provider located at [address]  [address]  [place] [address]      Recent Visits  No visits were found meeting these conditions  Showing recent visits within past 7 days and meeting all other requirements  Today's Visits  Date Type Provider Dept   06/22/22 Telemedicine Ben Nettles Jordan today's visits and meeting all other requirements  Future Appointments  No visits were found meeting these conditions  Showing future appointments within next 150 days and meeting all other requirements       After connecting through [employer], the patient was identified by name and date of birth  Nghia Brunner was informed that this is a telemedicine visit and that the visit is being conducted through [address] [place] [address] Now and patient was informed that this is a secure, HIPAA-compliant platform  She agrees to proceed  My office door was closed  No one else was in the room  She acknowledged consent and understanding of privacy and security of the video platform  Nghia Brunner verbally agrees to participate in [employer]  Pt is aware that [employer] could be limited without vital signs or the ability to perform a full hands-on physical Lonny Rosales understands she or the provider may request at any time to terminate the video visit and request the patient to seek care or treatment in person  Patient is aware this is a billable service

## 2022-06-30 ENCOUNTER — OFFICE VISIT (OUTPATIENT)
Dept: FAMILY MEDICINE CLINIC | Facility: CLINIC | Age: 77
End: 2022-06-30
Payer: MEDICARE

## 2022-06-30 VITALS
BODY MASS INDEX: 32.62 KG/M2 | HEIGHT: 66 IN | OXYGEN SATURATION: 91 % | RESPIRATION RATE: 18 BRPM | SYSTOLIC BLOOD PRESSURE: 126 MMHG | WEIGHT: 203 LBS | HEART RATE: 71 BPM | DIASTOLIC BLOOD PRESSURE: 70 MMHG | TEMPERATURE: 99.1 F

## 2022-06-30 DIAGNOSIS — Z12.31 ENCOUNTER FOR SCREENING MAMMOGRAM FOR MALIGNANT NEOPLASM OF BREAST: ICD-10-CM

## 2022-06-30 DIAGNOSIS — Z13.0 SCREENING FOR DEFICIENCY ANEMIA: ICD-10-CM

## 2022-06-30 DIAGNOSIS — Z23 NEED FOR VACCINATION: ICD-10-CM

## 2022-06-30 DIAGNOSIS — Z78.0 POST-MENOPAUSAL: ICD-10-CM

## 2022-06-30 DIAGNOSIS — Z12.11 SCREENING FOR COLORECTAL CANCER: ICD-10-CM

## 2022-06-30 DIAGNOSIS — N18.2 TYPE 2 DIABETES MELLITUS WITH STAGE 2 CHRONIC KIDNEY DISEASE, WITHOUT LONG-TERM CURRENT USE OF INSULIN (HCC): ICD-10-CM

## 2022-06-30 DIAGNOSIS — Z12.12 SCREENING FOR COLORECTAL CANCER: ICD-10-CM

## 2022-06-30 DIAGNOSIS — E11.22 TYPE 2 DIABETES MELLITUS WITH STAGE 2 CHRONIC KIDNEY DISEASE, WITHOUT LONG-TERM CURRENT USE OF INSULIN (HCC): ICD-10-CM

## 2022-06-30 DIAGNOSIS — I25.10 CORONARY ARTERY DISEASE INVOLVING NATIVE CORONARY ARTERY OF NATIVE HEART WITHOUT ANGINA PECTORIS: ICD-10-CM

## 2022-06-30 DIAGNOSIS — R42 LIGHTHEADEDNESS: Primary | ICD-10-CM

## 2022-06-30 DIAGNOSIS — Z13.29 SCREENING FOR THYROID DISORDER: ICD-10-CM

## 2022-06-30 PROCEDURE — G0009 ADMIN PNEUMOCOCCAL VACCINE: HCPCS

## 2022-06-30 PROCEDURE — 99214 OFFICE O/P EST MOD 30 MIN: CPT | Performed by: NURSE PRACTITIONER

## 2022-06-30 PROCEDURE — 90677 PCV20 VACCINE IM: CPT

## 2022-06-30 RX ORDER — METFORMIN HYDROCHLORIDE 750 MG/1
750 TABLET, EXTENDED RELEASE ORAL 2 TIMES DAILY WITH MEALS
Qty: 180 TABLET | Refills: 1 | Status: SHIPPED | OUTPATIENT
Start: 2022-06-30 | End: 2022-06-30 | Stop reason: SDUPTHER

## 2022-06-30 RX ORDER — MECLIZINE HCL 12.5 MG/1
12.5 TABLET ORAL 3 TIMES DAILY PRN
Qty: 30 TABLET | Refills: 0 | Status: SHIPPED | OUTPATIENT
Start: 2022-06-30

## 2022-06-30 RX ORDER — METFORMIN HYDROCHLORIDE 750 MG/1
750 TABLET, EXTENDED RELEASE ORAL 2 TIMES DAILY WITH MEALS
Qty: 180 TABLET | Refills: 1 | Status: SHIPPED | OUTPATIENT
Start: 2022-06-30 | End: 2022-12-27

## 2022-06-30 NOTE — PROGRESS NOTES
Assessment/Plan:    1  Lightheadedness  Comments:  will follow back for no improvement and worsening  Orders:  -     meclizine (ANTIVERT) 12 5 MG tablet; Take 1 tablet (12 5 mg total) by mouth 3 (three) times a day as needed for dizziness    2  Coronary artery disease involving native coronary artery of native heart without angina pectoris  Comments:  managed by cardiologist    3  Encounter for screening mammogram for malignant neoplasm of breast  -     Mammo screening bilateral w 3d & cad; Future; Expected date: 06/30/2022    4  Post-menopausal  -     DXA bone density spine hip and pelvis; Future; Expected date: 06/30/2022    5  Screening for colorectal cancer  -     Ambulatory referral for colonoscopy; Future    6  Need for vaccination  -     Pneumococcal Conjugate Vaccine 20-valent (Pcv20)    7  Type 2 diabetes mellitus with stage 2 chronic kidney disease, without long-term current use of insulin (HCC)  -     metFORMIN (GLUCOPHAGE-XR) 750 mg 24 hr tablet; Take 1 tablet (750 mg total) by mouth 2 (two) times a day with meals  -     Comprehensive metabolic panel; Future  -     HEMOGLOBIN A1C W/ EAG ESTIMATION; Future    8  Screening for deficiency anemia  -     CBC and Platelet; Future    9  Screening for thyroid disorder  -     TSH, 3rd generation with Free T4 reflex; Future      Body mass index is 32 77 kg/m²  BMI Counseling: Body mass index is 32 77 kg/m²  The BMI is above normal  Nutrition recommendations include reducing portion sizes, decreasing overall calorie intake, 3-5 servings of fruits/vegetables daily, reducing fast food intake, consuming healthier snacks, decreasing soda and/or juice intake, moderation in carbohydrate intake, increasing intake of lean protein, reducing intake of saturated fat and trans fat and reducing intake of cholesterol  Patient Instructions:  Supportive care discussed and advised  Advised to RTO for any worsening and no improvement     Follow up for no improvement and worsening of conditions  Patient advised and educated when to see immediate medical care  Return if symptoms worsen or fail to improve  Future Appointments   Date Time Provider Ashley Cee   10/19/2022 11:30 AM [facility]           Subjective:      Patient ID: Leon Ramirez is a 78 y o  female  Chief Complaint   Patient presents with    Follow-up    Diabetes     Dorothy Mann           Vitals:  /70   Pulse 71   Temp 99 1 °F (37 3 °C)   Resp 18   Ht 5' 6" (1 676 m)   Wt 92 1 kg (203 lb)   SpO2 91%   BMI 32 77 kg/m²     HPI  Patient is here for follow up  Stated that overall feeling better post covid-19 but still having lightheadedness on and off since covid-19 and still lingering cough  Denies fever, chills and sob  Under care of cardiologist and follows regularly  HbA1c is 7 8 and will increase metformin to 750 mg XR BID and will repeat blood work in 3 months and then will follow back in office  uptodate on eye exam and will collect reports  Due for mammogram and colonoscopy  Stated that very seldom noticed fresh red blood with stool and may be hemorrhoids but no other issues and will schedule colonoscopy  Advice to get shingrix vaccine at local pharmacy  Lost couple of pounds while had covid-19 as had poor appetite      The following portions of the patient's history were reviewed and updated as appropriate: allergies, current medications, past family history, past medical history, past social history, past surgical history and problem list       Review of Systems   Constitutional: Negative for chills, diaphoresis, fatigue, fever and unexpected weight change  HENT: Negative for congestion, dental problem, drooling, ear discharge, ear pain, facial swelling, hearing loss, mouth sores, nosebleeds, postnasal drip, rhinorrhea, sinus pressure, sinus pain, sneezing, sore throat, tinnitus, trouble swallowing and voice change      Respiratory: Negative for cough, chest tightness, shortness of breath and wheezing  Cardiovascular: Negative  Gastrointestinal: Negative for abdominal pain, constipation, diarrhea, nausea and vomiting  Musculoskeletal: Negative  Skin: Negative  Neurological: Positive for light-headedness  Negative for dizziness and headaches  Objective:    Social History     Tobacco Use   Smoking Status Former Smoker    Packs/day: 1 00    Types: Cigarettes   Smokeless Tobacco Former User    Quit date: 03/2012       Allergies: Allergies   Allergen Reactions    Prasugrel Rash     effient         Current Outpatient Medications   Medication Sig Dispense Refill    ASPIRIN 81 PO Take 81 mg by mouth      atorvastatin (LIPITOR) 80 mg tablet Take 1 tablet (80 mg total) by mouth daily 90 tablet 1    Calcium Carb-Cholecalciferol 600-800 MG-UNIT CHEW Chew 1 tablet daily      carvedilol (COREG) 12 5 mg tablet Take 1 tablet (12 5 mg total) by mouth in the morning and 1 tablet (12 5 mg total) in the evening  Take with meals  180 tablet 3    cetirizine (ZyrTEC) 10 mg tablet Take 10 mg by mouth      esomeprazole (NexIUM) 40 MG capsule Take 40 mg by mouth daily      meclizine (ANTIVERT) 12 5 MG tablet Take 1 tablet (12 5 mg total) by mouth 3 (three) times a day as needed for dizziness 30 tablet 0    metFORMIN (GLUCOPHAGE-XR) 750 mg 24 hr tablet Take 1 tablet (750 mg total) by mouth 2 (two) times a day with meals 180 tablet 1    nitroglycerin (NITROSTAT) 0 4 mg SL tablet Place 0 4 mg under the tongue      ramipril (ALTACE) 10 MG capsule Take 1 capsule (10 mg total) by mouth in the morning  90 capsule 3     No current facility-administered medications for this visit  Physical Exam  Vitals reviewed  Constitutional:       Appearance: Normal appearance  She is well-developed  HENT:      Head: Normocephalic        Right Ear: Tympanic membrane, ear canal and external ear normal       Left Ear: Tympanic membrane, ear canal and external ear normal       Nose: Nose normal       Right Sinus: No maxillary sinus tenderness or frontal sinus tenderness  Left Sinus: No maxillary sinus tenderness or frontal sinus tenderness  Mouth/Throat:      Mouth: No oral lesions  Pharynx: No oropharyngeal exudate or posterior oropharyngeal erythema  Cardiovascular:      Rate and Rhythm: Normal rate and regular rhythm  Pulses: no weak pulses          Dorsalis pedis pulses are 1+ on the right side and 1+ on the left side  Posterior tibial pulses are 1+ on the right side and 1+ on the left side  Heart sounds: Normal heart sounds  Pulmonary:      Effort: Pulmonary effort is normal       Breath sounds: Normal breath sounds  Musculoskeletal:         General: Normal range of motion  Cervical back: Neck supple  Feet:      Right foot:      Skin integrity: No ulcer, skin breakdown, erythema, warmth, callus or dry skin  Left foot:      Skin integrity: No ulcer, skin breakdown, erythema, warmth, callus or dry skin  Lymphadenopathy:      Cervical:      Right cervical: No superficial or posterior cervical adenopathy  Left cervical: No superficial or posterior cervical adenopathy  Skin:     General: Skin is warm and dry  Neurological:      General: No focal deficit present  Mental Status: She is alert and oriented to person, place, and time  GCS: GCS eye subscore is 4  GCS verbal subscore is 5  GCS motor subscore is 6  Cranial Nerves: No facial asymmetry  Sensory: Sensation is intact  Motor: No weakness  Coordination: Coordination normal       Gait: Gait normal    Psychiatric:         Behavior: Behavior normal          Thought Content: Thought content normal          Judgment: Judgment normal                Patient's shoes and socks removed  Right Foot/Ankle   Right Foot Inspection  Skin Exam: skin normal and skin intact   No dry skin, no warmth, no callus, no erythema, no maceration, no abnormal color, no pre-ulcer, no ulcer and no callus  Toe Exam: ROM and strength within normal limits  No swelling, no tenderness, erythema and  no right toe deformity    Sensory   Vibration: intact  Monofilament testing: intact    Vascular  Capillary refills: < 3 seconds  The right DP pulse is 1+  The right PT pulse is 1+  Left Foot/Ankle  Left Foot Inspection  Skin Exam: skin normal and skin intact  No dry skin, no warmth, no erythema, no maceration, normal color, no pre-ulcer, no ulcer and no callus  Toe Exam: ROM and strength within normal limits  No swelling, no tenderness, no erythema and no left toe deformity  Sensory   Vibration: intact  Monofilament testing: intact    Vascular  Capillary refills: < 3 seconds  The left DP pulse is 1+  The left PT pulse is 1+       Assign Risk Category  No deformity present  No loss of protective sensation  No weak pulses  Risk: 0        DAVID Lyn

## 2022-09-13 ENCOUNTER — OFFICE VISIT (OUTPATIENT)
Dept: URGENT CARE | Facility: CLINIC | Age: 77
End: 2022-09-13
Payer: MEDICARE

## 2022-09-13 VITALS
OXYGEN SATURATION: 96 % | HEART RATE: 74 BPM | WEIGHT: 202 LBS | SYSTOLIC BLOOD PRESSURE: 133 MMHG | TEMPERATURE: 98.9 F | BODY MASS INDEX: 32.47 KG/M2 | RESPIRATION RATE: 18 BRPM | HEIGHT: 66 IN | DIASTOLIC BLOOD PRESSURE: 73 MMHG

## 2022-09-13 DIAGNOSIS — J01.90 ACUTE SINUSITIS, RECURRENCE NOT SPECIFIED, UNSPECIFIED LOCATION: Primary | ICD-10-CM

## 2022-09-13 LAB
SARS-COV-2 AG UPPER RESP QL IA: NEGATIVE
VALID CONTROL: NORMAL

## 2022-09-13 PROCEDURE — 87811 SARS-COV-2 COVID19 W/OPTIC: CPT | Performed by: PHYSICIAN ASSISTANT

## 2022-09-13 PROCEDURE — 99213 OFFICE O/P EST LOW 20 MIN: CPT | Performed by: PHYSICIAN ASSISTANT

## 2022-09-13 RX ORDER — BENZONATATE 200 MG/1
200 CAPSULE ORAL 3 TIMES DAILY PRN
Qty: 20 CAPSULE | Refills: 0 | Status: SHIPPED | OUTPATIENT
Start: 2022-09-13 | End: 2022-10-19

## 2022-09-13 RX ORDER — FLUTICASONE PROPIONATE 50 MCG
1 SPRAY, SUSPENSION (ML) NASAL DAILY
Qty: 18.2 ML | Refills: 0 | Status: SHIPPED | OUTPATIENT
Start: 2022-09-13 | End: 2022-10-19

## 2022-09-13 NOTE — PROGRESS NOTES
[facility]        NAME: Louis Denson is a 78 y o  female  : 1945    MRN: [id number]  DATE: 2022  TIME: 12:29 PM    Assessment and Plan   Acute sinusitis, recurrence not specified, unspecified location [J01 90]  1  Acute sinusitis, recurrence not specified, unspecified location  Poct Covid 19 Rapid Antigen Test    benzonatate (TESSALON) 200 MG capsule    fluticasone (FLONASE) 50 mcg/act nasal spray         Patient Instructions     Patient Instructions   Take Tessalon and use Flonase as instructed  Rapid COVID test negative  Can take over-the-counter Tylenol as needed for any discomfort  Follow up with PCP in 3-5 days  Proceed to  ER if symptoms worsen  Chief Complaint     Chief Complaint   Patient presents with    Sinusitis     Cough, runny nose, sinus pain 3         History of Present Illness       Patient is a 77-year-old female presenting today with cold symptoms x4 days  Patient notes over the last few days she has been experiencing nasal congestion, sinus pressure, postnasal drip and a cough, has taken occasional over-the-counter Mucinex DM which she states not provided any relief, notes that her  is at home experiencing similar symptoms, was seen and evaluated here last week, is now feeling better  Denies fever, chills, trouble swallowing, chest tightness, SOB  Review of Systems   Review of Systems   Constitutional: Negative for chills, fatigue and fever  HENT: Positive for congestion, postnasal drip, sinus pressure and sore throat  Negative for ear pain and trouble swallowing  Eyes: Negative for pain  Respiratory: Positive for cough  Negative for shortness of breath  Gastrointestinal: Negative for abdominal pain  Musculoskeletal: Negative for arthralgias and myalgias  Skin: Negative for rash  Neurological: Negative for headaches           Current Medications       Current Outpatient Medications:     ASPIRIN 81 PO, Take 81 mg by mouth, Disp: , Rfl:     atorvastatin (LIPITOR) 80 mg tablet, Take 1 tablet (80 mg total) by mouth daily, Disp: 90 tablet, Rfl: 1    benzonatate (TESSALON) 200 MG capsule, Take 1 capsule (200 mg total) by mouth 3 (three) times a day as needed for cough, Disp: 20 capsule, Rfl: 0    carvedilol (COREG) 12 5 mg tablet, Take 1 tablet (12 5 mg total) by mouth in the morning and 1 tablet (12 5 mg total) in the evening  Take with meals  , Disp: 180 tablet, Rfl: 3    cetirizine (ZyrTEC) 10 mg tablet, Take 10 mg by mouth, Disp: , Rfl:     esomeprazole (NexIUM) 40 MG capsule, Take 40 mg by mouth daily, Disp: , Rfl:     fluticasone (FLONASE) 50 mcg/act nasal spray, 1 spray into each nostril daily, Disp: 18 2 mL, Rfl: 0    metFORMIN (GLUCOPHAGE-XR) 750 mg 24 hr tablet, Take 1 tablet (750 mg total) by mouth 2 (two) times a day with meals, Disp: 180 tablet, Rfl: 1    ramipril (ALTACE) 10 MG capsule, Take 1 capsule (10 mg total) by mouth in the morning , Disp: 90 capsule, Rfl: 3    Calcium Carb-Cholecalciferol 600-800 MG-UNIT CHEW, Chew 1 tablet daily (Patient not taking: Reported on 9/13/2022), Disp: , Rfl:     meclizine (ANTIVERT) 12 5 MG tablet, Take 1 tablet (12 5 mg total) by mouth 3 (three) times a day as needed for dizziness (Patient not taking: Reported on 9/13/2022), Disp: 30 tablet, Rfl: 0    nitroglycerin (NITROSTAT) 0 4 mg SL tablet, Place 0 4 mg under the tongue (Patient not taking: Reported on 9/13/2022), Disp: , Rfl:     Current Allergies     Allergies as of 09/13/2022 - Reviewed 09/13/2022   Allergen Reaction Noted    Prasugrel Rash 02/09/2018            The following portions of the patient's history were reviewed and updated as appropriate: allergies, current medications, past family history, past medical history, past social history, past surgical history and problem list      Past Medical History:   Diagnosis Date    GERD (gastroesophageal reflux disease)     Heart attack (HCC)     High cholesterol  Hypertension        Past Surgical History:   Procedure Laterality Date    CARPAL TUNNEL RELEASE      CORONARY ANGIOPLASTY WITH STENT PLACEMENT         Family History   Problem Relation Age of Onset    Diabetes Mother     Arthritis Mother          Medications have been verified  Objective   /73   Pulse 74   Temp 98 9 °F (37 2 °C)   Resp 18   Ht 5' 6" (1 676 m)   Wt 91 6 kg (202 lb)   SpO2 96%   BMI 32 60 kg/m²        Physical Exam     Physical Exam  Vitals and nursing note reviewed  Constitutional:       General: She is not in acute distress  Appearance: Normal appearance  She is not ill-appearing  HENT:      Head: Normocephalic and atraumatic  Right Ear: Tympanic membrane, ear canal and external ear normal       Left Ear: Tympanic membrane, ear canal and external ear normal       Nose:      Right Turbinates: Swollen and pale  Left Turbinates: Swollen and pale  Right Sinus: Maxillary sinus tenderness present  No frontal sinus tenderness  Left Sinus: Maxillary sinus tenderness present  No frontal sinus tenderness  Mouth/Throat:      Mouth: Mucous membranes are moist       Pharynx: Oropharynx is clear  Eyes:      Conjunctiva/sclera: Conjunctivae normal       Pupils: Pupils are equal, round, and reactive to light  Cardiovascular:      Rate and Rhythm: Normal rate and regular rhythm  Pulses: Normal pulses  Heart sounds: Normal heart sounds  Pulmonary:      Effort: Pulmonary effort is normal       Breath sounds: Normal breath sounds  Musculoskeletal:      Cervical back: Normal range of motion  No tenderness  Lymphadenopathy:      Cervical: No cervical adenopathy  Skin:     General: Skin is warm  Capillary Refill: Capillary refill takes less than 2 seconds  Neurological:      Mental Status: She is alert

## 2022-09-13 NOTE — PATIENT INSTRUCTIONS
Take Tessalon and use Flonase as instructed  Rapid COVID test negative  Can take over-the-counter Tylenol as needed for any discomfort

## 2022-09-22 ENCOUNTER — OFFICE VISIT (OUTPATIENT)
Dept: DIABETES SERVICES | Facility: CLINIC | Age: 77
End: 2022-09-22
Payer: MEDICARE

## 2022-09-22 ENCOUNTER — PREP FOR PROCEDURE (OUTPATIENT)
Dept: GASTROENTEROLOGY | Facility: CLINIC | Age: 77
End: 2022-09-22

## 2022-09-22 ENCOUNTER — TELEPHONE (OUTPATIENT)
Dept: GASTROENTEROLOGY | Facility: CLINIC | Age: 77
End: 2022-09-22

## 2022-09-22 VITALS — WEIGHT: 204.9 LBS | BODY MASS INDEX: 33.07 KG/M2

## 2022-09-22 DIAGNOSIS — E11.22 TYPE 2 DIABETES MELLITUS WITH STAGE 2 CHRONIC KIDNEY DISEASE, WITHOUT LONG-TERM CURRENT USE OF INSULIN (HCC): Primary | ICD-10-CM

## 2022-09-22 DIAGNOSIS — Z12.11 SCREENING FOR COLON CANCER: Primary | ICD-10-CM

## 2022-09-22 DIAGNOSIS — N18.2 TYPE 2 DIABETES MELLITUS WITH STAGE 2 CHRONIC KIDNEY DISEASE, WITHOUT LONG-TERM CURRENT USE OF INSULIN (HCC): Primary | ICD-10-CM

## 2022-09-22 PROCEDURE — 97802 MEDICAL NUTRITION INDIV IN: CPT

## 2022-09-22 NOTE — TELEPHONE ENCOUNTER
Scheduled date of colonoscopy (as of today):10/17/22    Physician performing colonoscopy:Favio    [facility] of colonoscopy[facility]    Bowel prep reviewed with patient:KANU/MAG    Instructions reviewed with patient by:epi    Clearances: na  Diabetic  Inst mailed 9/22/22

## 2022-09-22 NOTE — PATIENT INSTRUCTIONS
Consume 3 balanced meals/day at appropriate times  30 grams carbohydrate/meal and 15 grams carbohydrate/snack  Consume 2 balanced snacks/day at appropriate times

## 2022-09-22 NOTE — PROGRESS NOTES
Medical Nutrition Therapy        Assessment    Visit Type: Initial visit  Chief complaint/Medical Diagnosis/reason for visit E11 22 Type 2 Diabetes Mellitus with Kidney complication without long term current use of insulin    HPI Phylicia Magana came in today for her initial nutrition visit  Phylicia Magana stated her goal is to lose weight and lower her A1C  Meka's most current A1C was 7 8 on 5/3/22  Problems identified in food recall include inconsistent carbohydrate intake, sometimes avoiding carbohydrate, unbalanced meals, irregular timing of meals, and going too long without eating     Provided patient with a 1600 calorie balanced individualized meal plan to assist with consistency, balance and portion control  Encouraged the consumption of regular meals and snacks at appropriate times  Advised patient to keep carbohydrate intake to 30 grams per meal and 15 grams per snack to assist with glycemic control  My Plate, food models, portion booklet and food labels were used to teach basic carbohydrate counting  Patient stated she did not desire to make a nutrition follow up appointment at this time  However, she stated she will phone at a later date if she wishes to schedule a nutrition follow up  RD will remain available for further dietary questions/concerns  Ht Readings from Last 1 Encounters:   09/13/22 5' 6" (1 676 m)     Wt Readings from Last 3 Encounters:   09/22/22 92 9 kg (204 lb 14 4 oz)   09/13/22 91 6 kg (202 lb)   06/30/22 92 1 kg (203 lb)     Weight Change: Yes Gained 2 lbs since last encounter  Barriers to Learning: no barriers    Do you follow any special diet presently?: Yes - avoiding carbohydrates    Who shops: patient  Who cooks: patient    Food Log: Completed via the method of food recall    Breakfast:9:30AM - 2 eggs, with peppers, mushrooms, onions, water  Morning Snack:None  Lunch:2PM - 1 regular size Cristian Bar + 1 mini Cristian bar, diet snapple  Afternoon Snack: 5pm - cookie  Dinner:8PM - Open face chicken sandwich with roasted peppers and provolone cheese, diet huyen tea  Evening Snack:10pm - bowl of ice cream  Beverages: water, diet snapple, diet huyen tea  Eating out/Take out:seldom  Exercise Has back issue,currently, with problem walking  Calorie needs 1600kcals/day  Carbs: 30g/meal, 15g/snack          Nutrition Diagnosis:  Food and nutrition related knowledge deficit  related to Lack of prior exposure to accurate nutrition related information as evidenced by Verbalizes inaccurate or incomplete information    Intervention: plate method, label reading, carbohydrate counting, meal timing, meal planning, individualized meal plan and monitoring portion control     Treatment Goals: Patient will consume 3 meals a day, Patient will consume snacks and Patient will count carbohydrates    Monitoring and evaluation:    Term code indicator  FH 1 3 2 Food Intake Criteria: Consume 3 balanced meals/day at appropriate times  Term code indicator  FH 1 6 3 Carbohydrate Intake Criteria: 30 grams carbohydrate/meal and 15 grams carbohydrate/snack  Term code indicator  FH 1 3 2 Food Intake Criteria: Consume 2 balanced snacks /day at appropriate times  Materials Provided: Portion booklet, CHO counting, individualized meal plan    Patients Response to Instruction:  Comprehensiongood  Motivationgood  Expected Compliancegood    Begin Time: 10:45AM  End Time: 11:45AM  Referring Provider: Dr Cholo Garcia    Thank you for coming to the [facility] for education today  Please feel free to call with any questions or concerns      Darion Barrett[address] [address] [phone]

## 2022-10-12 ENCOUNTER — RA CDI HCC (OUTPATIENT)
Dept: OTHER | Facility: HOSPITAL | Age: 77
End: 2022-10-12

## 2022-10-12 NOTE — PROGRESS NOTES
[address]  coding opportunities          Chart Reviewed number of suggestions sent to Provider: 1   E11 65    Patients Insurance     Medicare Insurance: [employer]

## 2022-10-14 ENCOUNTER — APPOINTMENT (OUTPATIENT)
Dept: LAB | Facility: HOSPITAL | Age: 77
End: 2022-10-14
Payer: MEDICARE

## 2022-10-14 DIAGNOSIS — E11.22 TYPE 2 DIABETES MELLITUS WITH STAGE 2 CHRONIC KIDNEY DISEASE, WITHOUT LONG-TERM CURRENT USE OF INSULIN (HCC): ICD-10-CM

## 2022-10-14 DIAGNOSIS — Z13.0 SCREENING FOR DEFICIENCY ANEMIA: ICD-10-CM

## 2022-10-14 DIAGNOSIS — Z13.29 SCREENING FOR THYROID DISORDER: ICD-10-CM

## 2022-10-14 DIAGNOSIS — Z11.59 NEED FOR HEPATITIS C SCREENING TEST: ICD-10-CM

## 2022-10-14 DIAGNOSIS — N18.2 TYPE 2 DIABETES MELLITUS WITH STAGE 2 CHRONIC KIDNEY DISEASE, WITHOUT LONG-TERM CURRENT USE OF INSULIN (HCC): ICD-10-CM

## 2022-10-14 LAB
ALBUMIN SERPL BCP-MCNC: 3.6 G/DL (ref 3.5–5)
ALP SERPL-CCNC: 80 U/L (ref 46–116)
ALT SERPL W P-5'-P-CCNC: 30 U/L (ref 12–78)
ANION GAP SERPL CALCULATED.3IONS-SCNC: 6 MMOL/L (ref 4–13)
AST SERPL W P-5'-P-CCNC: 15 U/L (ref 5–45)
BILIRUB SERPL-MCNC: 0.48 MG/DL (ref 0.2–1)
BUN SERPL-MCNC: 18 MG/DL (ref 5–25)
CALCIUM SERPL-MCNC: 9.2 MG/DL (ref 8.3–10.1)
CHLORIDE SERPL-SCNC: 102 MMOL/L (ref 96–108)
CO2 SERPL-SCNC: 29 MMOL/L (ref 21–32)
CREAT SERPL-MCNC: 0.87 MG/DL (ref 0.6–1.3)
ERYTHROCYTE [DISTWIDTH] IN BLOOD BY AUTOMATED COUNT: 12.6 % (ref 11.6–15.1)
EST. AVERAGE GLUCOSE BLD GHB EST-MCNC: 151 MG/DL
GFR SERPL CREATININE-BSD FRML MDRD: 65 ML/MIN/1.73SQ M
GLUCOSE SERPL-MCNC: 143 MG/DL (ref 65–140)
HBA1C MFR BLD: 6.9 %
HCT VFR BLD AUTO: 38.7 % (ref 34.8–46.1)
HCV AB SER QL: NORMAL
HGB BLD-MCNC: 12.9 G/DL (ref 11.5–15.4)
MCH RBC QN AUTO: 30.5 PG (ref 26.8–34.3)
MCHC RBC AUTO-ENTMCNC: 33.3 G/DL (ref 31.4–37.4)
MCV RBC AUTO: 92 FL (ref 82–98)
PLATELET # BLD AUTO: 240 THOUSANDS/UL (ref 149–390)
PMV BLD AUTO: 10.4 FL (ref 8.9–12.7)
POTASSIUM SERPL-SCNC: 4.1 MMOL/L (ref 3.5–5.3)
PROT SERPL-MCNC: 7.3 G/DL (ref 6.4–8.4)
RBC # BLD AUTO: 4.23 MILLION/UL (ref 3.81–5.12)
SODIUM SERPL-SCNC: 137 MMOL/L (ref 135–147)
TSH SERPL DL<=0.05 MIU/L-ACNC: 1.94 UIU/ML (ref 0.45–4.5)
WBC # BLD AUTO: 7.29 THOUSAND/UL (ref 4.31–10.16)

## 2022-10-14 PROCEDURE — 80053 COMPREHEN METABOLIC PANEL: CPT

## 2022-10-14 PROCEDURE — 36415 COLL VENOUS BLD VENIPUNCTURE: CPT

## 2022-10-14 PROCEDURE — 86803 HEPATITIS C AB TEST: CPT

## 2022-10-14 PROCEDURE — 85027 COMPLETE CBC AUTOMATED: CPT

## 2022-10-14 PROCEDURE — 84443 ASSAY THYROID STIM HORMONE: CPT

## 2022-10-14 PROCEDURE — 83036 HEMOGLOBIN GLYCOSYLATED A1C: CPT

## 2022-10-15 RX ORDER — SODIUM CHLORIDE, SODIUM LACTATE, POTASSIUM CHLORIDE, CALCIUM CHLORIDE 600; 310; 30; 20 MG/100ML; MG/100ML; MG/100ML; MG/100ML
125 INJECTION, SOLUTION INTRAVENOUS CONTINUOUS
Status: CANCELLED | OUTPATIENT
Start: 2022-10-15

## 2022-10-17 ENCOUNTER — ANESTHESIA (OUTPATIENT)
Dept: GASTROENTEROLOGY | Facility: AMBULARY SURGERY CENTER | Age: 77
End: 2022-10-17

## 2022-10-17 ENCOUNTER — HOSPITAL ENCOUNTER (OUTPATIENT)
Dept: GASTROENTEROLOGY | Facility: AMBULARY SURGERY CENTER | Age: 77
Setting detail: OUTPATIENT SURGERY
Discharge: HOME | End: 2022-10-17
Attending: INTERNAL MEDICINE
Payer: MEDICARE

## 2022-10-17 ENCOUNTER — ANESTHESIA EVENT (OUTPATIENT)
Dept: GASTROENTEROLOGY | Facility: AMBULARY SURGERY CENTER | Age: 77
End: 2022-10-17

## 2022-10-17 VITALS
HEART RATE: 74 BPM | RESPIRATION RATE: 18 BRPM | TEMPERATURE: 97 F | SYSTOLIC BLOOD PRESSURE: 119 MMHG | OXYGEN SATURATION: 96 % | DIASTOLIC BLOOD PRESSURE: 56 MMHG

## 2022-10-17 DIAGNOSIS — Z12.11 SCREENING FOR COLON CANCER: ICD-10-CM

## 2022-10-17 PROBLEM — IMO0001 SMOKING: Status: ACTIVE | Noted: 2022-10-17

## 2022-10-17 PROBLEM — F17.200 SMOKING: Status: ACTIVE | Noted: 2022-10-17

## 2022-10-17 LAB — GLUCOSE SERPL-MCNC: 156 MG/DL (ref 65–140)

## 2022-10-17 PROCEDURE — G0121 COLON CA SCRN NOT HI RSK IND: HCPCS | Performed by: INTERNAL MEDICINE

## 2022-10-17 PROCEDURE — 82948 REAGENT STRIP/BLOOD GLUCOSE: CPT

## 2022-10-17 RX ORDER — LIDOCAINE HYDROCHLORIDE 10 MG/ML
INJECTION, SOLUTION EPIDURAL; INFILTRATION; INTRACAUDAL; PERINEURAL AS NEEDED
Status: DISCONTINUED | OUTPATIENT
Start: 2022-10-17 | End: 2022-10-17

## 2022-10-17 RX ORDER — ONDANSETRON 2 MG/ML
4 INJECTION INTRAMUSCULAR; INTRAVENOUS ONCE AS NEEDED
Status: CANCELLED | OUTPATIENT
Start: 2022-10-17

## 2022-10-17 RX ORDER — PROPOFOL 10 MG/ML
INJECTION, EMULSION INTRAVENOUS AS NEEDED
Status: DISCONTINUED | OUTPATIENT
Start: 2022-10-17 | End: 2022-10-17

## 2022-10-17 RX ORDER — SODIUM CHLORIDE, SODIUM LACTATE, POTASSIUM CHLORIDE, CALCIUM CHLORIDE 600; 310; 30; 20 MG/100ML; MG/100ML; MG/100ML; MG/100ML
125 INJECTION, SOLUTION INTRAVENOUS CONTINUOUS
Status: DISCONTINUED | OUTPATIENT
Start: 2022-10-17 | End: 2022-10-21 | Stop reason: HOSPADM

## 2022-10-17 RX ORDER — PROPOFOL 10 MG/ML
INJECTION, EMULSION INTRAVENOUS CONTINUOUS PRN
Status: DISCONTINUED | OUTPATIENT
Start: 2022-10-17 | End: 2022-10-17

## 2022-10-17 RX ADMIN — LIDOCAINE HYDROCHLORIDE 50 MG: 10 INJECTION, SOLUTION EPIDURAL; INFILTRATION; INTRACAUDAL; PERINEURAL at 09:04

## 2022-10-17 RX ADMIN — SODIUM CHLORIDE, SODIUM LACTATE, POTASSIUM CHLORIDE, AND CALCIUM CHLORIDE: .6; .31; .03; .02 INJECTION, SOLUTION INTRAVENOUS at 09:04

## 2022-10-17 RX ADMIN — PROPOFOL 140 MCG/KG/MIN: 10 INJECTION, EMULSION INTRAVENOUS at 09:06

## 2022-10-17 RX ADMIN — PROPOFOL 100 MG: 10 INJECTION, EMULSION INTRAVENOUS at 09:04

## 2022-10-17 NOTE — ANESTHESIA PREPROCEDURE EVALUATION
Procedure:  COLONOSCOPY    Relevant Problems   ANESTHESIA (within normal limits)      CARDIO   (+) Angina of effort (HCC)   (+) Coronary artery disease involving native coronary artery of native heart without angina pectoris   (+) Dyspnea on exertion   (+) Myocardial infarction (HCC)   (+) Primary hypertension      ENDO   (+) Type 2 diabetes mellitus with kidney complication, without long-term current use of insulin (HCC)      NEURO/PSYCH   (+) History of non-ST elevation myocardial infarction (NSTEMI)      PULMONARY   (+) Dyspnea on exertion   (+) Smoking        Physical Exam    Airway    Mallampati score: II  TM Distance: >3 FB  Neck ROM: full     Dental   No notable dental hx     Cardiovascular  Rhythm: regular, Rate: normal,     Pulmonary  Breath sounds clear to auscultation,     Other Findings        Anesthesia Plan  ASA Score- 3     Anesthesia Type- IV sedation with anesthesia with ASA Monitors  Additional Monitors:   Airway Plan:           Plan Factors-Exercise tolerance (METS): >4 METS  Chart reviewed  EKG reviewed  Existing labs reviewed  Patient summary reviewed  Patient is a current smoker  Patient did not smoke on day of surgery  Obstructive sleep apnea risk education given perioperatively  Induction-     Postoperative Plan-     Informed Consent- Anesthetic plan and risks discussed with patient  I personally reviewed this patient with the CRNA  Discussed and agreed on the Anesthesia Plan with the CRNA  Marck Ornelas

## 2022-10-17 NOTE — PROGRESS NOTES
Reviewed discharge instructions with patient and family member  States understanding and denies any questions at this time  Assisted patient into civilian clothing  Awaiting review with Dr Nahid Dyson  Will ambulate with patient to exit

## 2022-10-17 NOTE — H&P
History and Physical - [facility]  Harpreet Jade 78 y o  female MRN: [id number]                  HPI: Harpreet Jade is a 78y o  year old female who presents for screening colonoscopy  Reported history bowel change including intermittent bright red hematochezia over the past year or more      REVIEW OF SYSTEMS: Per the HPI, and otherwise unremarkable      Historical Information   Past Medical History:   Diagnosis Date   • Coronary artery disease     pt has 6 stents total-2012 x1, 5/29/12-x3, 1/9/13 x2   • Diabetes mellitus ([facility] )    • GERD (gastroesophageal reflux disease)    • High cholesterol    • Hypertension    • Myocardial infarction ([facility] ) 03/2012    x1 stent   • Shortness of breath     with exertion     Past Surgical History:   Procedure Laterality Date   • CARPAL TUNNEL RELEASE Right    • CORONARY ANGIOPLASTY WITH STENT PLACEMENT      x6 stents total   • FOOT SURGERY      bunionectomy   • GANGLION CYST EXCISION Right      Social History   Social History     Substance and Sexual Activity   Alcohol Use Yes    Comment: rare     Social History     Substance and Sexual Activity   Drug Use Never     Social History     Tobacco Use   Smoking Status Former Smoker   • Packs/day: 1 00   • Types: Cigarettes   • Quit date: 2012   • Years since quitting: 10 8   Smokeless Tobacco Former User   • Quit date: 03/2012     Family History   Problem Relation Age of Onset   • Diabetes Mother    • Arthritis Mother        Meds/Allergies       Current Outpatient Medications:   •  ASPIRIN 81 PO  •  atorvastatin (LIPITOR) 80 mg tablet  •  Calcium Carbonate (CALTRATE 600 PO)  •  carvedilol (COREG) 12 5 mg tablet  •  cetirizine (ZyrTEC) 10 mg tablet  •  esomeprazole (NexIUM) 40 MG capsule  •  meclizine (ANTIVERT) 12 5 MG tablet  •  metFORMIN (GLUCOPHAGE-XR) 750 mg 24 hr tablet  •  ramipril (ALTACE) 10 MG capsule  •  benzonatate (TESSALON) 200 MG capsule  •  fluticasone (FLONASE) 50 mcg/act nasal spray  • nitroglycerin (NITROSTAT) 0 4 mg SL tablet  •  Polyethylene Glycol 3350 (MIRALAX PO)  •  Triprolidine-Pseudoephedrine (ANTIHISTAMINE PO)    Current Facility-Administered Medications:   •  lactated ringers infusion, 125 mL/hr, Intravenous, Continuous    Allergies   Allergen Reactions   • Prasugrel Rash     effient       Objective     /78   Pulse 73   Temp (!) 97 °F (36 1 °C) (Temporal)   Resp 18   SpO2 97%       PHYSICAL EXAM    Gen: NAD  Head: NCAT  CV: RRR  CHEST: Clear  ABD: soft, NT/ND  EXT: no edema      ASSESSMENT/PLAN:  This is a 78y o  year old female here for colonoscopy, and she is stable and optimized for her procedure

## 2022-10-17 NOTE — PROGRESS NOTES
Patient procedure complete  Patient returned to [facility]; asleep and lying in stretcher  Vitals WNL  Family called and updated  Will continue to monitor

## 2022-10-19 ENCOUNTER — OFFICE VISIT (OUTPATIENT)
Dept: FAMILY MEDICINE CLINIC | Facility: CLINIC | Age: 77
End: 2022-10-19
Payer: MEDICARE

## 2022-10-19 VITALS
BODY MASS INDEX: 32.92 KG/M2 | TEMPERATURE: 97.3 F | RESPIRATION RATE: 19 BRPM | WEIGHT: 204.8 LBS | HEIGHT: 66 IN | OXYGEN SATURATION: 99 % | SYSTOLIC BLOOD PRESSURE: 134 MMHG | DIASTOLIC BLOOD PRESSURE: 76 MMHG | HEART RATE: 65 BPM

## 2022-10-19 DIAGNOSIS — N18.2 TYPE 2 DIABETES MELLITUS WITH STAGE 2 CHRONIC KIDNEY DISEASE, WITHOUT LONG-TERM CURRENT USE OF INSULIN (HCC): Primary | ICD-10-CM

## 2022-10-19 DIAGNOSIS — E78.5 DYSLIPIDEMIA: ICD-10-CM

## 2022-10-19 DIAGNOSIS — I25.10 CORONARY ARTERY DISEASE INVOLVING NATIVE CORONARY ARTERY OF NATIVE HEART WITHOUT ANGINA PECTORIS: ICD-10-CM

## 2022-10-19 DIAGNOSIS — E11.22 TYPE 2 DIABETES MELLITUS WITH STAGE 2 CHRONIC KIDNEY DISEASE, WITHOUT LONG-TERM CURRENT USE OF INSULIN (HCC): Primary | ICD-10-CM

## 2022-10-19 PROCEDURE — 99214 OFFICE O/P EST MOD 30 MIN: CPT | Performed by: NURSE PRACTITIONER

## 2022-10-19 NOTE — PROGRESS NOTES
Assessment/Plan:    1  Type 2 diabetes mellitus with stage 2 chronic kidney disease, without long-term current use of insulin ([facility] [address] )  Assessment & Plan:  HbA1c improved and at goal and will continue same regimen and will recheck blood work in 6 months  Foot care and eye exam advised  Lab Results   Component Value Date    HGBA1C 6 9 (H) 10/14/2022       Orders:  -     HEMOGLOBIN A1C W/ EAG ESTIMATION; Future  -     Comprehensive metabolic panel; Future  -     Lipid Panel with Direct LDL reflex; Future  -     Microalbumin / creatinine urine ratio    2  Coronary artery disease involving native coronary artery of native heart without angina pectoris  Assessment & Plan:  Complaint with regimen and managed by cardiologist    Orders:  -     Lipid Panel with Direct LDL reflex; Future    3  Dyslipidemia  Assessment & Plan:  Complaint with statin and tolerating it well              Patient Instructions:  Supportive care discussed and advised  Advised to RTO for any worsening and no improvement  Follow up for no improvement and worsening of conditions  Patient advised and educated when to see immediate medical care  Return in about 6 months (around 4/19/2023)  Future Appointments   Date Time Provider Ashley Cee   11/16/2022 10:00 AM DO TIFFANIE Joseph [facility]   4/19/2023 11:00 AM DAVID Argueta [facility]           Subjective:      Patient ID: Louis Head is a 78 y o  female  Chief Complaint   Patient presents with   • Follow-up     3 month f/u nm lpn         Vitals:  /76   Pulse 65   Temp (!) 97 3 °F (36 3 °C)   Resp 19   Ht 5' 6" (1 676 m)   Wt 92 9 kg (204 lb 12 8 oz)   SpO2 99%   BMI 33 06 kg/m²   Wt Readings from Last 3 Encounters:   10/19/22 92 9 kg (204 lb 12 8 oz)   09/22/22 92 9 kg (204 lb 14 4 oz)   09/13/22 91 6 kg (202 lb)      HPI  Patient is here for 6 month follow up  Denies any concerns and complains    Complaint with medications and tolerating it well  Blood work done and reviewed  HbA1c improved  Will continue with same regimen  Stated that her stool are soft since metformin increased and had BM once or twice a day and does not affect her quality of life and will continue with it at this time and advised to call for any concerns or any new symptoms or if develops diarrhea  Recent colonoscopy showed left sided diverticula and advised on diet and advised to avoid seeds and nuts  Counseled that due for mammogram and dexa and will schedule that  Wants to get covid-19 vaccine first then will do flu vaccine        The following portions of the patient's history were reviewed and updated as appropriate: allergies, current medications, past family history, past medical history, past social history, past surgical history and problem list       Review of Systems   Constitutional: Negative  HENT: Negative  Respiratory: Negative  Cardiovascular: Negative  Gastrointestinal: Negative  Genitourinary: Negative  Skin: Negative  Neurological: Negative  Psychiatric/Behavioral: Negative  Objective:    Social History     Tobacco Use   Smoking Status Former Smoker   • Packs/day: 1 00   • Types: Cigarettes   • Quit date: 2012   • Years since quitting: 10 8   Smokeless Tobacco Former User   • Quit date: 03/2012       Allergies: Allergies   Allergen Reactions   • Prasugrel Rash     effient         Current Outpatient Medications   Medication Sig Dispense Refill   • ASPIRIN 81 PO Take 81 mg by mouth every morning     • atorvastatin (LIPITOR) 80 mg tablet Take 1 tablet (80 mg total) by mouth daily (Patient taking differently: Take 80 mg by mouth daily at bedtime) 90 tablet 1   • Calcium Carbonate (CALTRATE 600 PO) Take by mouth in the morning     • carvedilol (COREG) 12 5 mg tablet Take 1 tablet (12 5 mg total) by mouth in the morning and 1 tablet (12 5 mg total) in the evening  Take with meals   180 tablet 3   • esomeprazole (NexIUM) 40 MG capsule Take 40 mg by mouth every morning     • meclizine (ANTIVERT) 12 5 MG tablet Take 1 tablet (12 5 mg total) by mouth 3 (three) times a day as needed for dizziness 30 tablet 0   • metFORMIN (GLUCOPHAGE-XR) 750 mg 24 hr tablet Take 1 tablet (750 mg total) by mouth 2 (two) times a day with meals 180 tablet 1   • nitroglycerin (NITROSTAT) 0 4 mg SL tablet Place 0 4 mg under the tongue every 5 (five) minutes as needed     • ramipril (ALTACE) 10 MG capsule Take 1 capsule (10 mg total) by mouth in the morning  (Patient taking differently: Take 10 mg by mouth daily at bedtime) 90 capsule 3   • Triprolidine-Pseudoephedrine (ANTIHISTAMINE PO) Take by mouth daily at bedtime     • cetirizine (ZyrTEC) 10 mg tablet Take 10 mg by mouth daily (Patient not taking: Reported on 10/19/2022)     • Polyethylene Glycol 3350 (MIRALAX PO) Take by mouth 1 (one) time 238 gm (Patient not taking: Reported on 10/19/2022)       No current facility-administered medications for this visit  Facility-Administered Medications Ordered in Other Visits   Medication Dose Route Frequency Provider Last Rate Last Admin   • lactated ringers infusion  125 mL/hr Intravenous Continuous Jayden Saez MD   New Bag at 10/17/22 0904          Physical Exam  Constitutional:       Appearance: Normal appearance  HENT:      Head: Normocephalic and atraumatic  Nose: Nose normal    Eyes:      Conjunctiva/sclera: Conjunctivae normal    Cardiovascular:      Rate and Rhythm: Normal rate and regular rhythm  Pulses: Normal pulses  Heart sounds: Normal heart sounds  Pulmonary:      Effort: Pulmonary effort is normal       Breath sounds: Normal breath sounds  Skin:     General: Skin is warm and dry  Findings: No rash  Neurological:      Mental Status: She is alert and oriented to person, place, and time  Psychiatric:         Mood and Affect: Mood normal          Behavior: Behavior normal          Thought Content:  Thought content normal  Judgment: Judgment normal                      Philip King

## 2022-10-19 NOTE — ASSESSMENT & PLAN NOTE
HbA1c improved and at goal and will continue same regimen and will recheck blood work in 6 months   Foot care and eye exam advised  Lab Results   Component Value Date    HGBA1C 6 9 (H) 10/14/2022

## 2022-10-19 NOTE — PATIENT INSTRUCTIONS
Diabetes and Exercise   WHAT YOU NEED TO KNOW:   Physical activity, such as exercise, can help keep your blood sugar level steady or improve insulin resistance  Activity can help decrease your risk for heart disease, and help you lose weight  Exercise can also help lower your A1c  Your diabetes care team will help you create an exercise plan  The plan will be based on the type of diabetes you have and your starting fitness level  DISCHARGE INSTRUCTIONS:   Call your local emergency number (911 in the [address] [facility] [facility]) if:   You have chest pain or shortness of breath  Return to the emergency department if:   You have a low blood sugar level and it does not improve with treatment  Symptoms are trouble thinking, a pounding heartbeat, and sweating  Your blood sugar level is above 240 mg/dL and does not come down within 15 minutes of treatment  You have blurred or double vision  Your breath has a fruity, sweet smell, or your breathing is shallow  Call your doctor or diabetes care team if:   You have ketones in your blood or urine  You have a fever  Your blood sugar levels are higher than your target goals  You often have low blood sugar levels  Your skin is red, dry, warm, or swollen  You have a wound that does not heal     You have trouble coping with diabetes, or you feel anxious or depressed  You have questions or concerns about your condition or care  Tips to help you create and meet your exercise goals:   Set a goal for 150 minutes (2 5 hours) of moderate to vigorous aerobic activity each week  Aerobic activity helps your heart stay strong  Aerobic activity includes walking, bicycling, dancing, swimming, and raking leaves  Spread aerobic activity over 3 to 5 days  Do not take more than 2 days off in a row  It is best to do at least 10 minutes at a time and 30 minutes each day  You can work up to these goals  Remember that any activity is better than no activity   Over time, you can make exercise more intense or last longer  You can also add more days of exercise as your fitness level improves  Your diabetes care team can help you make a step-by-step plan to achieve your goals  Set a strength training goal of 2 to 3 times a week  Take at least 1 day off in between strength training sessions  Strength training helps you keep the muscles you have and build new muscles  Strength training includes lifting weights, climbing stairs, yoga, and galdino chi          Older adults should include balance training 2 to 3 times each week  These include walking backwards, standing on one foot, and walking heel to toe in a straight line  Other healthy exercise tips:   Stretch before and after you exercise to prevent injury  Drink water or liquids that do not contain sugar before, during, and after exercise  Ask your dietitian or healthcare provider which liquids you should drink when you exercise  Do not sit for longer than 30 minutes at a time during your day  If you cannot walk around, at least stand up  This will help you stay active and keep your blood circulating  Exercise and blood sugar levels:  Check your blood sugar level before and after exercise, if you use insulin  Healthcare providers may tell you to change the amount of insulin you take or food you eat  If your blood sugar level is high, check your blood or urine for ketones before you exercise  Do not exercise if your blood sugar level is high and you have ketones  If your blood sugar level is less than 100 mg/dL, have a carbohydrate snack before you exercise  Examples are 4 to 6 crackers, ½ banana, 8 ounces (1 cup) of milk, or 4 ounces (½ cup) of juice  Follow up with your doctor or diabetes care team as directed:  Write down your questions so you remember to ask them during your visits    © Copyright [employer] 2022 Information is for End User's use only and may not be sold, redistributed or otherwise used for commercial purposes  All illustrations and images included in CareNotes® are the copyrighted property of A D A M , Inc  or Pheobe Saleem  The above information is an  only  It is not intended as medical advice for individual conditions or treatments  Talk to your doctor, nurse or pharmacist before following any medical regimen to see if it is safe and effective for you

## 2022-11-02 DIAGNOSIS — I25.10 CORONARY ARTERY DISEASE INVOLVING NATIVE CORONARY ARTERY OF NATIVE HEART WITHOUT ANGINA PECTORIS: ICD-10-CM

## 2022-11-02 DIAGNOSIS — N18.2 TYPE 2 DIABETES MELLITUS WITH STAGE 2 CHRONIC KIDNEY DISEASE, WITHOUT LONG-TERM CURRENT USE OF INSULIN (HCC): ICD-10-CM

## 2022-11-02 DIAGNOSIS — E11.22 TYPE 2 DIABETES MELLITUS WITH STAGE 2 CHRONIC KIDNEY DISEASE, WITHOUT LONG-TERM CURRENT USE OF INSULIN (HCC): ICD-10-CM

## 2022-11-02 RX ORDER — METFORMIN HYDROCHLORIDE 750 MG/1
TABLET, EXTENDED RELEASE ORAL
Qty: 180 TABLET | Refills: 1 | Status: SHIPPED | OUTPATIENT
Start: 2022-11-02

## 2022-11-04 RX ORDER — ATORVASTATIN CALCIUM 80 MG/1
TABLET, FILM COATED ORAL
Qty: 90 TABLET | Refills: 1 | Status: SHIPPED | OUTPATIENT
Start: 2022-11-04

## 2022-11-16 ENCOUNTER — OFFICE VISIT (OUTPATIENT)
Dept: CARDIOLOGY CLINIC | Facility: CLINIC | Age: 77
End: 2022-11-16

## 2022-11-16 VITALS
OXYGEN SATURATION: 98 % | DIASTOLIC BLOOD PRESSURE: 72 MMHG | SYSTOLIC BLOOD PRESSURE: 140 MMHG | TEMPERATURE: 97.8 F | BODY MASS INDEX: 32.95 KG/M2 | WEIGHT: 205 LBS | HEIGHT: 66 IN | HEART RATE: 75 BPM

## 2022-11-16 DIAGNOSIS — E78.5 DYSLIPIDEMIA: ICD-10-CM

## 2022-11-16 DIAGNOSIS — I25.10 CORONARY ARTERY DISEASE INVOLVING NATIVE CORONARY ARTERY OF NATIVE HEART WITHOUT ANGINA PECTORIS: Primary | ICD-10-CM

## 2022-11-16 DIAGNOSIS — E11.9 TYPE 2 DIABETES MELLITUS WITHOUT COMPLICATION, WITHOUT LONG-TERM CURRENT USE OF INSULIN (HCC): ICD-10-CM

## 2022-11-16 DIAGNOSIS — I10 PRIMARY HYPERTENSION: ICD-10-CM

## 2022-11-16 NOTE — PROGRESS NOTES
Cardiology   Rula Smith DO, Kenya Weiner MD, Pavithra Hrat MD, Esa Chanel MD, [facility] [place]  -------------------------------------------------------------------  [facility] ORTHOPEDIC \[facility] [place] [facility]\"" and Vascular Center  [address], [address], [address] [place] [address], [facility]  [phone]    Cardiology Follow Up  Bere Carlin  1945  [id number]          Assessment/Plan:    1  Coronary artery disease involving native coronary artery of native heart without angina pectoris    2  Type 2 diabetes mellitus without complication, without long-term current use of insulin ([address] )    3  Dyslipidemia    4  Primary hypertension      - recent stress test reviewed  No ischemia or infarction present  Ejection fraction was normal on echocardiogram   - Discussed risk factor reduction including refraining from smoking, eating a diet high in fruits and vegetables, maintaining a healthy weight, limiting screen time along with controlling BP and cholesterol  Encouraged to exercise 150 minutes a week at a moderate level such as a fast walk or 75 minutes of high intensity   - Jardiance was ordered last visit but she could not take it because it was expensive  Continue metformin and regular follow-up with her primary care provider    - continue atorvastatin 80 mg daily  No lipid panel done recently  She does have order to have it done early next year  She had normal lipid levels last check  - continue carvedilol 12 5 mg b i d  And ramipril 10 mg daily  Interval History:     Bere Carlin is 78 y o  female here for followup of CAD and elevated BP  During her last visit, blood pressure was mildly elevated  She had repeat check with her primary medical provider which was better  She denies any chest pain or shortness of breath at this time  She has pain in her upper back but otherwise has no complaints    She denies any lower extremity edema, orthopnea or paroxysmal nocturnal dyspnea  The patient had stress test and echocardiogram done because of exertional dyspnea  She has a history of coronary artery disease with previous multivessel PCI in 2012 and 2013 (PCI mLAD and D1 stents - 1/9/2013 + PCI: [place], [place]oad, PDA - 5/29/2012   She denies any history of myocardial infarction  She was feeling fatigue and weakness prior to stent placement which improved after her stents were complete  Nuclear stress test was done which did not show any ischemia or infarction  Ejection fraction was 80%  Echocardiogram showed ejection fraction of 60% with moderate LA dilation and mild valve disease  The following portions of the patient's history were reviewed and updated as appropriate: allergies, current medications, past family history, past medical history, past social history, past surgical history, and problem list        Current Outpatient Medications:   •  ASPIRIN 81 PO, Take 81 mg by mouth every morning, Disp: , Rfl:   •  atorvastatin (LIPITOR) 80 mg tablet, TAKE 1 TABLET EVERY DAY, Disp: 90 tablet, Rfl: 1  •  Calcium Carbonate (CALTRATE 600 PO), Take by mouth in the morning, Disp: , Rfl:   •  carvedilol (COREG) 12 5 mg tablet, Take 1 tablet (12 5 mg total) by mouth in the morning and 1 tablet (12 5 mg total) in the evening  Take with meals  , Disp: 180 tablet, Rfl: 3  •  esomeprazole (NexIUM) 40 MG capsule, Take 40 mg by mouth every morning, Disp: , Rfl:   •  meclizine (ANTIVERT) 12 5 MG tablet, Take 1 tablet (12 5 mg total) by mouth 3 (three) times a day as needed for dizziness, Disp: 30 tablet, Rfl: 0  •  metFORMIN (GLUCOPHAGE-XR) 750 mg 24 hr tablet, TAKE 1 TABLET TWICE DAILY WITH A MEAL, Disp: 180 tablet, Rfl: 1  •  nitroglycerin (NITROSTAT) 0 4 mg SL tablet, Place 0 4 mg under the tongue every 5 (five) minutes as needed, Disp: , Rfl:   •  ramipril (ALTACE) 10 MG capsule, Take 1 capsule (10 mg total) by mouth in the morning   (Patient taking differently: Take 10 mg by mouth daily at bedtime), Disp: 90 capsule, Rfl: 3  •  Triprolidine-Pseudoephedrine (ANTIHISTAMINE PO), Take by mouth daily at bedtime, Disp: , Rfl:   •  cetirizine (ZyrTEC) 10 mg tablet, Take 10 mg by mouth daily (Patient not taking: Reported on 10/19/2022), Disp: , Rfl:   •  Polyethylene Glycol 3350 (MIRALAX PO), Take by mouth 1 (one) time 238 gm (Patient not taking: Reported on 10/19/2022), Disp: , Rfl:         Review of Systems:  Review of Systems   Respiratory: Negative for shortness of breath  Cardiovascular: Negative for chest pain, palpitations and leg swelling  Musculoskeletal: Positive for arthralgias  All other systems reviewed and are negative  Physical Exam:  Vitals:  Vitals:    11/16/22 0956   BP: 140/72   BP Location: Right arm   Patient Position: Sitting   Cuff Size: Large   Pulse: 75   Temp: 97 8 °F (36 6 °C)   SpO2: 98%   Weight: 93 kg (205 lb)   Height: 5' 6" (1 676 m)     Physical Exam   Constitutional: She appears healthy  No distress  Eyes: Pupils are equal, round, and reactive to light  Conjunctivae are normal    Neck: No JVD present  Cardiovascular: Normal rate, regular rhythm and normal heart sounds  Exam reveals no gallop and no friction rub  No murmur heard  Pulmonary/Chest: Effort normal and breath sounds normal  She has no wheezes  She has no rales  Musculoskeletal:         General: No tenderness, deformity or edema  Cervical back: Normal range of motion and neck supple  Neurological: She is alert and oriented to person, place, and time  Skin: Skin is warm and dry  Cardiographics:  EKG: Personally reviewed   Last known EF: 55%    This note was completed in part utilizing [employer] Software  Grammatical errors, random word insertions, spelling mistakes, and incomplete sentences can be an occasional consequence of this system secondary to software limitations, ambient noise, and hardware issues    If you have any questions or concerns about the content, text, or information contained within the body of this dictation, please contact the provider for clarification

## 2022-11-21 ENCOUNTER — TELEPHONE (OUTPATIENT)
Dept: FAMILY MEDICINE CLINIC | Facility: CLINIC | Age: 77
End: 2022-11-21

## 2022-11-21 NOTE — TELEPHONE ENCOUNTER
Patient called to confirm smoking history and stated that smoked abut 5 to cigrates a day for 20 years and then smoked one time one pack a day for less than 6 months and quit 10 years ago  Does not meet criteria for CT lung screening as per current guidelines

## 2023-02-01 ENCOUNTER — VBI (OUTPATIENT)
Dept: ADMINISTRATIVE | Facility: OTHER | Age: 78
End: 2023-02-01

## 2023-02-07 ENCOUNTER — VBI (OUTPATIENT)
Dept: ADMINISTRATIVE | Facility: OTHER | Age: 78
End: 2023-02-07

## 2023-02-07 DIAGNOSIS — I25.10 CORONARY ARTERY DISEASE INVOLVING NATIVE CORONARY ARTERY OF NATIVE HEART WITHOUT ANGINA PECTORIS: ICD-10-CM

## 2023-02-07 RX ORDER — CARVEDILOL 12.5 MG/1
12.5 TABLET ORAL 2 TIMES DAILY WITH MEALS
Qty: 180 TABLET | Refills: 3 | Status: SHIPPED | OUTPATIENT
Start: 2023-02-07

## 2023-02-07 RX ORDER — RAMIPRIL 10 MG/1
10 CAPSULE ORAL
Qty: 90 CAPSULE | Refills: 3 | Status: SHIPPED | OUTPATIENT
Start: 2023-02-07 | End: 2024-02-02

## 2023-02-07 RX ORDER — ATORVASTATIN CALCIUM 80 MG/1
80 TABLET, FILM COATED ORAL DAILY
Qty: 90 TABLET | Refills: 1 | Status: SHIPPED | OUTPATIENT
Start: 2023-02-07

## 2023-02-07 NOTE — TELEPHONE ENCOUNTER
The following are refill request    Patient updated her pharm info in [facility]:   All refills should now go to [facility] mail del:  NEW CARD SCANNED    ramipril (ALTACE) 10 MG capsule    carvedilol (COREG) 12 5 mg tablet    atorvastatin (LIPITOR) 80 mg tablet

## 2023-02-08 ENCOUNTER — VBI (OUTPATIENT)
Dept: ADMINISTRATIVE | Facility: OTHER | Age: 78
End: 2023-02-08

## 2023-02-09 DIAGNOSIS — N18.2 TYPE 2 DIABETES MELLITUS WITH STAGE 2 CHRONIC KIDNEY DISEASE, WITHOUT LONG-TERM CURRENT USE OF INSULIN (HCC): ICD-10-CM

## 2023-02-09 DIAGNOSIS — E11.22 TYPE 2 DIABETES MELLITUS WITH STAGE 2 CHRONIC KIDNEY DISEASE, WITHOUT LONG-TERM CURRENT USE OF INSULIN (HCC): ICD-10-CM

## 2023-02-09 RX ORDER — METFORMIN HYDROCHLORIDE 750 MG/1
750 TABLET, EXTENDED RELEASE ORAL 2 TIMES DAILY WITH MEALS
Qty: 180 TABLET | Refills: 1 | Status: SHIPPED | OUTPATIENT
Start: 2023-02-09

## 2023-02-09 NOTE — TELEPHONE ENCOUNTER
PT HAS A NEW PRESRIPTION PLAN A MAIL ORDER TO [facility] VALUE SCRIPT CARD SCANNED IN TO REGISTRATION , PLEASE SEND NEW PRESCRIPTION TO MAIL ORDER PHARMACY

## 2023-02-17 ENCOUNTER — VBI (OUTPATIENT)
Dept: ADMINISTRATIVE | Facility: OTHER | Age: 78
End: 2023-02-17

## 2023-02-24 ENCOUNTER — VBI (OUTPATIENT)
Dept: ADMINISTRATIVE | Facility: OTHER | Age: 78
End: 2023-02-24

## 2023-02-28 DIAGNOSIS — N18.2 TYPE 2 DIABETES MELLITUS WITH STAGE 2 CHRONIC KIDNEY DISEASE, WITHOUT LONG-TERM CURRENT USE OF INSULIN (HCC): ICD-10-CM

## 2023-02-28 DIAGNOSIS — I25.10 CORONARY ARTERY DISEASE INVOLVING NATIVE CORONARY ARTERY OF NATIVE HEART WITHOUT ANGINA PECTORIS: ICD-10-CM

## 2023-02-28 DIAGNOSIS — E11.22 TYPE 2 DIABETES MELLITUS WITH STAGE 2 CHRONIC KIDNEY DISEASE, WITHOUT LONG-TERM CURRENT USE OF INSULIN (HCC): ICD-10-CM

## 2023-02-28 RX ORDER — METFORMIN HYDROCHLORIDE 750 MG/1
750 TABLET, EXTENDED RELEASE ORAL 2 TIMES DAILY WITH MEALS
Qty: 180 TABLET | Refills: 1 | Status: SHIPPED | OUTPATIENT
Start: 2023-02-28

## 2023-03-02 RX ORDER — CARVEDILOL 12.5 MG/1
12.5 TABLET ORAL 2 TIMES DAILY WITH MEALS
Qty: 180 TABLET | Refills: 3 | Status: SHIPPED | OUTPATIENT
Start: 2023-03-02

## 2023-03-02 RX ORDER — ATORVASTATIN CALCIUM 80 MG/1
80 TABLET, FILM COATED ORAL DAILY
Qty: 90 TABLET | Refills: 3 | Status: SHIPPED | OUTPATIENT
Start: 2023-03-02

## 2023-03-02 RX ORDER — RAMIPRIL 10 MG/1
10 CAPSULE ORAL
Qty: 90 CAPSULE | Refills: 3 | Status: SHIPPED | OUTPATIENT
Start: 2023-03-02 | End: 2024-02-25

## 2023-04-18 PROBLEM — IMO0001 SMOKING: Status: RESOLVED | Noted: 2022-10-17 | Resolved: 2023-04-18

## 2023-04-18 PROBLEM — F17.200 SMOKING: Status: RESOLVED | Noted: 2022-10-17 | Resolved: 2023-04-18

## 2023-04-19 PROBLEM — J44.9 CHRONIC OBSTRUCTIVE PULMONARY DISEASE, UNSPECIFIED COPD TYPE (HCC): Status: ACTIVE | Noted: 2023-04-19

## 2023-04-20 ENCOUNTER — APPOINTMENT (OUTPATIENT)
Dept: LAB | Facility: HOSPITAL | Age: 78
End: 2023-04-20

## 2023-04-20 DIAGNOSIS — D52.1 DRUG-INDUCED FOLATE DEFICIENCY ANEMIA: ICD-10-CM

## 2023-04-20 DIAGNOSIS — R53.83 OTHER FATIGUE: ICD-10-CM

## 2023-04-20 DIAGNOSIS — N18.2 TYPE 2 DIABETES MELLITUS WITH STAGE 2 CHRONIC KIDNEY DISEASE, WITHOUT LONG-TERM CURRENT USE OF INSULIN (HCC): ICD-10-CM

## 2023-04-20 DIAGNOSIS — E78.5 DYSLIPIDEMIA: ICD-10-CM

## 2023-04-20 DIAGNOSIS — E11.22 TYPE 2 DIABETES MELLITUS WITH STAGE 2 CHRONIC KIDNEY DISEASE, WITHOUT LONG-TERM CURRENT USE OF INSULIN (HCC): ICD-10-CM

## 2023-04-20 LAB
ALBUMIN SERPL BCP-MCNC: 4.1 G/DL (ref 3.5–5)
ALP SERPL-CCNC: 67 U/L (ref 34–104)
ALT SERPL W P-5'-P-CCNC: 21 U/L (ref 7–52)
ANION GAP SERPL CALCULATED.3IONS-SCNC: 8 MMOL/L (ref 4–13)
AST SERPL W P-5'-P-CCNC: 15 U/L (ref 13–39)
BILIRUB SERPL-MCNC: 0.65 MG/DL (ref 0.2–1)
BUN SERPL-MCNC: 17 MG/DL (ref 5–25)
CALCIUM SERPL-MCNC: 9.2 MG/DL (ref 8.4–10.2)
CHLORIDE SERPL-SCNC: 102 MMOL/L (ref 96–108)
CHOLEST SERPL-MCNC: 141 MG/DL
CO2 SERPL-SCNC: 28 MMOL/L (ref 21–32)
CREAT SERPL-MCNC: 0.78 MG/DL (ref 0.6–1.3)
FERRITIN SERPL-MCNC: 110 NG/ML (ref 8–388)
GFR SERPL CREATININE-BSD FRML MDRD: 74 ML/MIN/1.73SQ M
GLUCOSE P FAST SERPL-MCNC: 137 MG/DL (ref 65–99)
HDLC SERPL-MCNC: 45 MG/DL
IRON SATN MFR SERPL: 17 % (ref 15–50)
IRON SERPL-MCNC: 64 UG/DL (ref 50–170)
LDLC SERPL CALC-MCNC: 70 MG/DL (ref 0–100)
POTASSIUM SERPL-SCNC: 4 MMOL/L (ref 3.5–5.3)
PROT SERPL-MCNC: 7.1 G/DL (ref 6.4–8.4)
SODIUM SERPL-SCNC: 138 MMOL/L (ref 135–147)
TIBC SERPL-MCNC: 369 UG/DL (ref 250–450)
TRIGL SERPL-MCNC: 128 MG/DL
TSH SERPL DL<=0.05 MIU/L-ACNC: 2.88 UIU/ML (ref 0.45–4.5)
VIT B12 SERPL-MCNC: 202 PG/ML (ref 100–900)

## 2023-04-21 LAB
EST. AVERAGE GLUCOSE BLD GHB EST-MCNC: 151 MG/DL
HBA1C MFR BLD: 6.9 %

## 2023-04-24 DIAGNOSIS — E11.22 TYPE 2 DIABETES MELLITUS WITH STAGE 2 CHRONIC KIDNEY DISEASE, WITHOUT LONG-TERM CURRENT USE OF INSULIN (HCC): Primary | ICD-10-CM

## 2023-04-24 DIAGNOSIS — N18.2 TYPE 2 DIABETES MELLITUS WITH STAGE 2 CHRONIC KIDNEY DISEASE, WITHOUT LONG-TERM CURRENT USE OF INSULIN (HCC): Primary | ICD-10-CM

## 2023-04-24 DIAGNOSIS — I10 PRIMARY HYPERTENSION: ICD-10-CM

## 2023-07-25 ENCOUNTER — OFFICE VISIT (OUTPATIENT)
Dept: CARDIOLOGY CLINIC | Facility: CLINIC | Age: 78
End: 2023-07-25
Payer: MEDICARE

## 2023-07-25 VITALS
DIASTOLIC BLOOD PRESSURE: 72 MMHG | SYSTOLIC BLOOD PRESSURE: 120 MMHG | HEART RATE: 80 BPM | HEIGHT: 66 IN | WEIGHT: 206 LBS | BODY MASS INDEX: 33.11 KG/M2 | OXYGEN SATURATION: 97 %

## 2023-07-25 DIAGNOSIS — I25.10 CORONARY ARTERY DISEASE INVOLVING NATIVE CORONARY ARTERY OF NATIVE HEART WITHOUT ANGINA PECTORIS: ICD-10-CM

## 2023-07-25 DIAGNOSIS — I50.32 DIASTOLIC DYSFUNCTION WITH CHRONIC HEART FAILURE (HCC): ICD-10-CM

## 2023-07-25 DIAGNOSIS — I10 PRIMARY HYPERTENSION: Primary | ICD-10-CM

## 2023-07-25 DIAGNOSIS — E78.5 DYSLIPIDEMIA: ICD-10-CM

## 2023-07-25 DIAGNOSIS — E11.9 TYPE 2 DIABETES MELLITUS WITHOUT COMPLICATION, WITHOUT LONG-TERM CURRENT USE OF INSULIN (HCC): ICD-10-CM

## 2023-07-25 DIAGNOSIS — R06.09 DYSPNEA ON EXERTION: ICD-10-CM

## 2023-07-25 PROCEDURE — 99214 OFFICE O/P EST MOD 30 MIN: CPT | Performed by: INTERNAL MEDICINE

## 2023-07-25 PROCEDURE — 93000 ELECTROCARDIOGRAM COMPLETE: CPT | Performed by: INTERNAL MEDICINE

## 2023-07-25 RX ORDER — FUROSEMIDE 20 MG/1
20 TABLET ORAL DAILY
Qty: 30 TABLET | Refills: 1 | Status: SHIPPED | OUTPATIENT
Start: 2023-07-25

## 2023-07-25 NOTE — PROGRESS NOTES
Cardiology   Curtis Oliveira DO, Diego Tabor MD, Barbara Menendez MD, Ping Powell MD, [facility] [place]  -------------------------------------------------------------------  [facility] and Vascular Center  [address] [place] [address], [address] [place] [address]  [phone]    Cardiology Follow Up  Daun Cost  1945  [id number]          Assessment/Plan:    1. Primary hypertension    2. Coronary artery disease involving native coronary artery of native heart without angina pectoris    3. Type 2 diabetes mellitus without complication, without long-term current use of insulin ([address])    4. Dyslipidemia      -  No ischemia or infarction present on last stress test.  Ejection fraction was normal on echocardiogram.  She had diastolic dysfunction.  -Lower extremity edema was present on exam today. Will prescribe furosemide 20 mg daily to use until edema resolves. She should monitor for any impact on dyspnea as well.  -Encouraged to monitor daily weights and use furosemide as needed if weight increases. -She has appointment with pulmonology to evaluate lung function.  -She will follow-up with her primary care provider to discuss potential use of Ozempic for diabetes and weight loss. - Discussed risk factor reduction including refraining from smoking, eating a diet high in fruits and vegetables, maintaining a healthy weight, limiting screen time along with controlling BP and cholesterol. Encouraged to exercise 150 minutes a week at a moderate level such as a fast walk or 75 minutes of high intensity.  - Jardiance was ordered last visit but she could not take it because it was expensive. - continue atorvastatin 80 mg daily. Blood work in April showed LDL of 70 and HDL of 45.  - continue carvedilol 12.5 mg b.i.d. And ramipril 10 mg daily. Interval History:     Daun Cost is 70 y.o. female here for followup of CAD and elevated BP.    She complains of exertional dyspnea and has noticed some lower extremity edema present for the past few months. She has intermittent episodes of atypical chest pain. She had a stress test and echocardiogram done in May 2022. There was no ischemia seen on stress test and echocardiogram showed normal ejection fraction with mild valve disease. There was grade 1 diastolic dysfunction. She has a history of coronary artery disease with previous multivessel PCI in 2012 and 2013 (PCI mLAD and D1 stents - 1/9/2013 + PCI: Phil Hagen, PDA - 5/29/2012 . She denies any history of myocardial infarction. She was feeling fatigue and weakness prior to stent placement which improved after her stents were complete. Nuclear stress test was done which did not show any ischemia or infarction. Ejection fraction was 80%  Echocardiogram showed ejection fraction of 60% with moderate LA dilation and mild valve disease.        The following portions of the patient's history were reviewed and updated as appropriate: allergies, current medications, past family history, past medical history, past social history, past surgical history, and problem list.       Current Outpatient Medications:   •  amLODIPine (NORVASC) 5 mg tablet, Take 1 tablet (5 mg total) by mouth daily, Disp: 90 tablet, Rfl: 1  •  ASPIRIN 81 PO, Take 81 mg by mouth every morning, Disp: , Rfl:   •  atorvastatin (LIPITOR) 80 mg tablet, Take 1 tablet (80 mg total) by mouth daily, Disp: 90 tablet, Rfl: 3  •  Calcium Carbonate (CALTRATE 600 PO), Take by mouth in the morning, Disp: , Rfl:   •  carvedilol (COREG) 12.5 mg tablet, Take 1 tablet (12.5 mg total) by mouth 2 (two) times a day with meals, Disp: 180 tablet, Rfl: 3  •  cetirizine (ZyrTEC) 10 mg tablet, Take 10 mg by mouth daily, Disp: , Rfl:   •  esomeprazole (NexIUM) 40 MG capsule, Take 40 mg by mouth every morning, Disp: , Rfl:   •  meclizine (ANTIVERT) 12.5 MG tablet, Take 1 tablet (12.5 mg total) by mouth 3 (three) times a day as needed for dizziness, Disp: 30 tablet, Rfl: 0  •  metFORMIN (GLUCOPHAGE-XR) 750 mg 24 hr tablet, Take 1 tablet (750 mg total) by mouth 2 (two) times a day with meals, Disp: 180 tablet, Rfl: 1  •  nitroglycerin (NITROSTAT) 0.4 mg SL tablet, Place 0.4 mg under the tongue every 5 (five) minutes as needed, Disp: , Rfl:   •  ramipril (ALTACE) 10 MG capsule, Take 1 capsule (10 mg total) by mouth daily at bedtime, Disp: 90 capsule, Rfl: 3  •  Triprolidine-Pseudoephedrine (ANTIHISTAMINE PO), Take by mouth daily at bedtime (Patient not taking: Reported on 4/19/2023), Disp: , Rfl:         Review of Systems:  Review of Systems   Respiratory: Positive for shortness of breath. Cardiovascular: Positive for leg swelling. Negative for chest pain and palpitations. Musculoskeletal: Positive for arthralgias and myalgias. All other systems reviewed and are negative. Physical Exam:  Vitals:  Vitals:    07/25/23 1333   BP: 120/72   BP Location: Right arm   Patient Position: Sitting   Cuff Size: Large   Pulse: 80   SpO2: 97%   Weight: 93.4 kg (206 lb)   Height: 5' 6" (1.676 m)     Physical Exam   Constitutional: She appears healthy. No distress. Eyes: Pupils are equal, round, and reactive to light. Conjunctivae are normal.   Neck: No JVD present. Cardiovascular: Normal rate, regular rhythm and normal heart sounds. Exam reveals no gallop and no friction rub. No murmur heard. Pulmonary/Chest: Effort normal and breath sounds normal. She has no wheezes. She has no rales. Musculoskeletal:         General: No tenderness, deformity or edema. Cervical back: Normal range of motion and neck supple. Neurological: She is alert and oriented to person, place, and time. Skin: Skin is warm and dry. Cardiographics:  EKG: Personally reviewed NSR with CHITO and CAROLINE  Last known EF: 55%    This note was completed in part utilizing [employer].   Grammatical errors, random word insertions, spelling mistakes, and incomplete sentences can be an occasional consequence of this system secondary to software limitations, ambient noise, and hardware issues. If you have any questions or concerns about the content, text, or information contained within the body of this dictation, please contact the provider for clarification.

## 2023-08-17 DIAGNOSIS — E11.22 TYPE 2 DIABETES MELLITUS WITH STAGE 2 CHRONIC KIDNEY DISEASE, WITHOUT LONG-TERM CURRENT USE OF INSULIN (HCC): ICD-10-CM

## 2023-08-17 DIAGNOSIS — N18.2 TYPE 2 DIABETES MELLITUS WITH STAGE 2 CHRONIC KIDNEY DISEASE, WITHOUT LONG-TERM CURRENT USE OF INSULIN (HCC): ICD-10-CM

## 2023-08-17 RX ORDER — METFORMIN HYDROCHLORIDE 750 MG/1
750 TABLET, EXTENDED RELEASE ORAL 2 TIMES DAILY WITH MEALS
Qty: 180 TABLET | Refills: 1 | Status: SHIPPED | OUTPATIENT
Start: 2023-08-17

## 2023-08-23 DIAGNOSIS — N18.2 TYPE 2 DIABETES MELLITUS WITH STAGE 2 CHRONIC KIDNEY DISEASE, WITHOUT LONG-TERM CURRENT USE OF INSULIN (HCC): ICD-10-CM

## 2023-08-23 DIAGNOSIS — E11.22 TYPE 2 DIABETES MELLITUS WITH STAGE 2 CHRONIC KIDNEY DISEASE, WITHOUT LONG-TERM CURRENT USE OF INSULIN (HCC): ICD-10-CM

## 2023-08-23 NOTE — TELEPHONE ENCOUNTER
Olya Aly left a message as to why one of her medications were sent to [facility] and not to [facility]  [facility]

## 2023-08-24 RX ORDER — METFORMIN HYDROCHLORIDE 750 MG/1
750 TABLET, EXTENDED RELEASE ORAL 2 TIMES DAILY WITH MEALS
Qty: 180 TABLET | Refills: 1 | OUTPATIENT
Start: 2023-08-24

## 2023-09-11 ENCOUNTER — TELEPHONE (OUTPATIENT)
Dept: FAMILY MEDICINE CLINIC | Facility: CLINIC | Age: 78
End: 2023-09-11

## 2023-09-11 DIAGNOSIS — N18.2 TYPE 2 DIABETES MELLITUS WITH STAGE 2 CHRONIC KIDNEY DISEASE, WITHOUT LONG-TERM CURRENT USE OF INSULIN: ICD-10-CM

## 2023-09-11 DIAGNOSIS — E11.22 TYPE 2 DIABETES MELLITUS WITH STAGE 2 CHRONIC KIDNEY DISEASE, WITHOUT LONG-TERM CURRENT USE OF INSULIN: ICD-10-CM

## 2023-09-11 RX ORDER — METFORMIN HYDROCHLORIDE 750 MG/1
750 TABLET, EXTENDED RELEASE ORAL 2 TIMES DAILY WITH MEALS
Qty: 180 TABLET | Refills: 1 | Status: SHIPPED | OUTPATIENT
Start: 2023-09-11

## 2023-09-11 RX ORDER — METFORMIN HYDROCHLORIDE 750 MG/1
750 TABLET, EXTENDED RELEASE ORAL 2 TIMES DAILY WITH MEALS
Qty: 180 TABLET | Refills: 1 | Status: CANCELLED | OUTPATIENT
Start: 2023-09-11

## 2023-09-11 NOTE — TELEPHONE ENCOUNTER
The patient left a voicemail stating that she had a mix up with her Metformin. She stated she would really like to speak with someone about it. She said she called almost 2 weeks ago, and never got a call back. She would like a call back today.      Ryan Barker, ARIANNA

## 2023-09-25 ENCOUNTER — CONSULT (OUTPATIENT)
Dept: PULMONOLOGY | Facility: MEDICAL CENTER | Age: 78
End: 2023-09-25
Payer: MEDICARE

## 2023-09-25 DIAGNOSIS — E66.09 CLASS 1 OBESITY DUE TO EXCESS CALORIES WITH SERIOUS COMORBIDITY AND BODY MASS INDEX (BMI) OF 33.0 TO 33.9 IN ADULT: ICD-10-CM

## 2023-09-25 DIAGNOSIS — G47.10 HYPERSOMNIA: Primary | ICD-10-CM

## 2023-09-25 DIAGNOSIS — R40.0 DAYTIME SLEEPINESS: ICD-10-CM

## 2023-09-25 DIAGNOSIS — R06.83 SNORING: ICD-10-CM

## 2023-09-25 DIAGNOSIS — R06.02 SOBOE (SHORTNESS OF BREATH ON EXERTION): ICD-10-CM

## 2023-09-25 DIAGNOSIS — Z87.891 PERSONAL HISTORY OF NICOTINE DEPENDENCE: ICD-10-CM

## 2023-09-25 DIAGNOSIS — Z12.2 ENCOUNTER FOR SCREENING FOR LUNG CANCER: ICD-10-CM

## 2023-09-25 PROCEDURE — 94010 BREATHING CAPACITY TEST: CPT

## 2023-09-25 PROCEDURE — 99204 OFFICE O/P NEW MOD 45 MIN: CPT | Performed by: INTERNAL MEDICINE

## 2023-09-25 RX ORDER — ALBUTEROL SULFATE 90 UG/1
2 AEROSOL, METERED RESPIRATORY (INHALATION) EVERY 4 HOURS PRN
Qty: 8.5 G | Refills: 7 | Status: SHIPPED | OUTPATIENT
Start: 2023-09-25

## 2023-09-25 NOTE — PATIENT INSTRUCTIONS
Weight loss book: Dr Marilou Mack "Feel Better Fast and Mask it Last."    Complete CT lung cancer screening. If shortness of breath worsens, Call for more Proair inhalers. [facility] office number: [phone]. Complete home sleep study.

## 2023-09-25 NOTE — PROGRESS NOTES
Assessment/Plan:    Problem List Items Addressed This Visit        Other    SOBOE (shortness of breath on exertion)     Sergio Castro does have some shortness of breath with exertion. Pulse oximetry done with patient ambulating 200 feet showed no significant decrease in O2 saturation. Lowest O2 saturation 96%. Spirometry tree today shows mild restrictive impairment possibly due to her being overweight. I cannot exclude air trapping from COPD    I did prescribe her an albuterol inhaler she can use 2 puffs 4 times a day as needed or take 2 puffs prior to any strenuous activity. Relevant Medications    albuterol (ProAir HFA) 90 mcg/act inhaler    Other Relevant Orders    POCT spirometry (Completed)    Daytime sleepiness     Sergio Castro has had excessive daytime fatigue  fatigue for past several years. Likely has significant JUAN F. She was agreeable to a home sleep study. She reports she had a sleep study done in 2012 at outside facility that was positive for sleep apnea but did not pursue any treatment. Hypersomnia - Primary     In the states she did have a prior sleep study done in 2012 which showed JUAN F but she did not pursue any treatment. She does have daytime fatigue as evidenced by Duarte sleepiness score of 16. I did discuss the diagnosis and treatment of JUAN F with her. She was agreeable to a home sleep study and I placed order for this. She will contact my office after this is completed         Relevant Orders    Home Study    Personal history of nicotine dependence     Need to quit smoking around age 74 but did smoke 1/2 pack of cigarettes per day for 42 years. She is eligible for lung cancer screening CT. I did tell this to her and she was agreeable to have this done.   We placed order for lung cancer screening CT         Relevant Orders    CT lung screening program    Class 1 obesity due to excess calories with serious comorbidity and body mass index (BMI) of 33.0 to 33.9 in adult     Sergio Castro states she has gained 10 pounds in last year. I did talk to her about diet and exercise. I did recommend book by Dr. Jonh Fernandez, feel better fast and make it last    Mild restrictive impairment noted on spirometry today is likely due to her obesity. Other Visit Diagnoses     Snoring        Encounter for screening for lung cancer        Relevant Orders    CT lung screening program            Plan for follow up:  Return if symptoms worsen or fail to improve. All questions are answered to the patient's satisfaction and understanding. She verbalizes understanding. She is encouraged to call with any further questions or concerns. Portions of the record may have been created with voice recognition software. Occasional wrong word or "sound a like" substitutions may have occurred due to the inherent limitations of voice recognition software. Read the chart carefully and recognize, using context, where substitutions have occurred. a    Electronically Signed by Sarah Trimble,     ______________________________________________________________________    Chief Complaint: No chief complaint on file. Patient ID: Ida Acosta is a 70 y.o. y.o. female has a past medical history of Coronary artery disease, Diabetes mellitus ([address]), GERD (gastroesophageal reflux disease), High cholesterol, Hypertension, Myocardial infarction ([address]) (03/2012), and Shortness of breath. 9/25/2023  Patient presents today for initial visit for JUAN F evaluation and possible COPD    HPI     Patient is a former smoker. She started smoking at 27. She smoked on average 0.5 ppd for approximately 40 years. She quit smoking in 2012 after her MI. She does have periodic coughing and wheezing. Becomes short of breath when walking up stairs and after walking >100 feet. Patient snores each night. Denies waking up gasping for air. Wakes up frequently throughout the nigh to go to the bathroom. . She has daytime fatigue. ESS is 18.  She endorses that she could easily fall asleep if she sits down midday. Patient sleeps approximately 8 hours/night. She does have history of having a sleep study done at [facility] in [place] does have diabetes mellitus type 2 and coronary artery disease. She had PCI in 2012 and in 2013 had PCI and stents of the mid LAD and D1. Then in January 2013 she had angioplasty again done of several vessels. She is not having any chest pain with activity. She did have an echocardiogram in May 2022 which showed normal LV systolic function with ejection fraction of 60%. Right ventricular systolic function appeared normal.  There was mild mitral regurgitation. No evidence of pulmonary artery hypertension. A nuclear stress test done in May 2022 showed no evidence of ischemia. She does have history of hypertension. Review of Systems   Constitutional: Positive for fatigue. Negative for chills, fever and unexpected weight change. HENT: Negative for congestion, rhinorrhea and sore throat. Eyes: Negative for discharge and redness. Respiratory: Positive for shortness of breath. Cardiovascular: Negative for chest pain, palpitations and leg swelling. Gastrointestinal: Negative for abdominal distention, abdominal pain and nausea. Endocrine: Negative for polydipsia and polyphagia. Genitourinary: Negative for dysuria. Musculoskeletal: Negative for joint swelling and myalgias. Skin: Negative for rash. Neurological: Negative for light-headedness. Psychiatric/Behavioral: Negative for decreased concentration. Social history: She reports that she quit smoking about 11 years ago. Her smoking use included cigarettes. She started smoking about 56 years ago. She has a 20.00 pack-year smoking history. She quit smokeless tobacco use about 11 years ago. She reports current alcohol use. She reports that she does not use drugs.     Past surgical history:   Past Surgical History: Procedure Laterality Date   • CARPAL TUNNEL RELEASE Right    • CORONARY ANGIOPLASTY WITH STENT PLACEMENT      x6 stents total   • FOOT SURGERY      bunionectomy   • GANGLION CYST EXCISION Right      Family history:   Family History   Problem Relation Age of Onset   • Diabetes Mother    • Arthritis Mother        Immunization History   Administered Date(s) Administered   • COVID-19 PFIZER VACCINE 0.3 ML IM 03/08/2021, 03/29/2021   • Pneumococcal Conjugate Vaccine 20-valent (Pcv20), Polysace 06/30/2022     Current Outpatient Medications   Medication Sig Dispense Refill   • albuterol (ProAir HFA) 90 mcg/act inhaler Inhale 2 puffs every 4 (four) hours as needed for wheezing or shortness of breath 8.5 g 7   • amLODIPine (NORVASC) 5 mg tablet Take 1 tablet (5 mg total) by mouth daily 90 tablet 1   • ASPIRIN 81 PO Take 81 mg by mouth every morning     • atorvastatin (LIPITOR) 80 mg tablet Take 1 tablet (80 mg total) by mouth daily 90 tablet 3   • Calcium Carbonate (CALTRATE 600 PO) Take by mouth in the morning     • carvedilol (COREG) 12.5 mg tablet Take 1 tablet (12.5 mg total) by mouth 2 (two) times a day with meals 180 tablet 3   • cetirizine (ZyrTEC) 10 mg tablet Take 10 mg by mouth daily     • esomeprazole (NexIUM) 40 MG capsule Take 40 mg by mouth every morning     • metFORMIN (GLUCOPHAGE-XR) 750 mg 24 hr tablet Take 1 tablet (750 mg total) by mouth 2 (two) times a day with meals 180 tablet 1   • nitroglycerin (NITROSTAT) 0.4 mg SL tablet Place 0.4 mg under the tongue every 5 (five) minutes as needed     • ramipril (ALTACE) 10 MG capsule Take 1 capsule (10 mg total) by mouth daily at bedtime 90 capsule 3   • furosemide (LASIX) 20 mg tablet Take 1 tablet (20 mg total) by mouth daily (Patient not taking: Reported on 9/25/2023) 30 tablet 1   • meclizine (ANTIVERT) 12.5 MG tablet Take 1 tablet (12.5 mg total) by mouth 3 (three) times a day as needed for dizziness (Patient not taking: Reported on 9/25/2023) 30 tablet 0 • Triprolidine-Pseudoephedrine (ANTIHISTAMINE PO) Take by mouth daily at bedtime (Patient not taking: Reported on 4/19/2023)       No current facility-administered medications for this visit. Allergies: Prasugrel    Objective:  Vitals:    09/25/23 1340   BP: 122/78   Pulse: 70   Resp: 12   Temp: 98.6 °F (37 °C)   SpO2: 97%   Weight: 94.8 kg (209 lb)   Height: 5' 6" (1.676 m)   Oxygen Therapy  SpO2: 97 %  . Wt Readings from Last 3 Encounters:   09/25/23 94.8 kg (209 lb)   07/25/23 93.4 kg (206 lb)   04/19/23 95.7 kg (211 lb)     Body mass index is 33.73 kg/m². Physical Exam  Constitutional:       General: She is not in acute distress. Appearance: She is well-developed. HENT:      Head: Normocephalic. Nose: Nose normal.      Mouth/Throat:      Mouth: Mucous membranes are moist.      Pharynx: Oropharynx is clear. No oropharyngeal exudate. Comments: Mallampati 3. Eyes:      Conjunctiva/sclera: Conjunctivae normal.      Pupils: Pupils are equal, round, and reactive to light. Neck:      Vascular: No JVD. Trachea: No tracheal deviation. Cardiovascular:      Rate and Rhythm: Normal rate and regular rhythm. Heart sounds: Normal heart sounds. Pulmonary:      Effort: Pulmonary effort is normal.      Comments: Lung sounds clear  Abdominal:      General: There is no distension. Palpations: Abdomen is soft. Tenderness: There is no abdominal tenderness. There is no guarding. Musculoskeletal:      Cervical back: Neck supple. Comments: Bilateral +1 pitting edema   Lymphadenopathy:      Cervical: No cervical adenopathy. Skin:     General: Skin is warm and dry. Findings: No rash. Neurological:      Mental Status: She is alert and oriented to person, place, and time. Psychiatric:         Behavior: Behavior normal.         Thought Content:  Thought content normal.       Neck circumference:  15.5  ESS: 16    Pulse oximetry testing: Room air O2 saturation at rest was 98% and after ambulating 200 feet lowest O2 saturation was 96%    Lab Review:   4/20/23 serum HbA1c - 6.9, creartinine 0.78      Office Spirometry Results: done today    FVC - 2.01 L    66%  FEV1 - 1.61 L  71%  FEV1/FVC% - 80%    Mild restrictive impairment possibly due to being overweight     ESS: Total score: 16  No results found.

## 2023-09-26 VITALS
OXYGEN SATURATION: 97 % | WEIGHT: 209 LBS | TEMPERATURE: 98.6 F | BODY MASS INDEX: 33.59 KG/M2 | SYSTOLIC BLOOD PRESSURE: 122 MMHG | HEART RATE: 70 BPM | HEIGHT: 66 IN | DIASTOLIC BLOOD PRESSURE: 78 MMHG | RESPIRATION RATE: 12 BRPM

## 2023-09-26 PROBLEM — Z87.891 PERSONAL HISTORY OF NICOTINE DEPENDENCE: Status: ACTIVE | Noted: 2023-09-26

## 2023-09-26 PROBLEM — R06.02 SOBOE (SHORTNESS OF BREATH ON EXERTION): Status: ACTIVE | Noted: 2020-09-27

## 2023-09-26 PROBLEM — E66.09 CLASS 1 OBESITY DUE TO EXCESS CALORIES WITH SERIOUS COMORBIDITY AND BODY MASS INDEX (BMI) OF 33.0 TO 33.9 IN ADULT: Status: ACTIVE | Noted: 2023-09-26

## 2023-09-26 PROBLEM — G47.10 HYPERSOMNIA: Status: ACTIVE | Noted: 2023-09-26

## 2023-09-26 PROBLEM — E66.811 CLASS 1 OBESITY DUE TO EXCESS CALORIES WITH SERIOUS COMORBIDITY AND BODY MASS INDEX (BMI) OF 33.0 TO 33.9 IN ADULT: Status: ACTIVE | Noted: 2023-09-26

## 2023-09-26 PROBLEM — R40.0 DAYTIME SLEEPINESS: Status: ACTIVE | Noted: 2023-09-26

## 2023-09-26 NOTE — ASSESSMENT & PLAN NOTE
Jessica Menchaca states she has gained 10 pounds in last year. I did talk to her about diet and exercise. I did recommend book by Dr. Jayden Massey, feel better fast and make it last    Mild restrictive impairment noted on spirometry today is likely due to her obesity.

## 2023-09-26 NOTE — ASSESSMENT & PLAN NOTE
Need to quit smoking around age 74 but did smoke 1/2 pack of cigarettes per day for 42 years. She is eligible for lung cancer screening CT. I did tell this to her and she was agreeable to have this done.   We placed order for lung cancer screening CT

## 2023-09-26 NOTE — ASSESSMENT & PLAN NOTE
Salud Hernandez does have some shortness of breath with exertion. Pulse oximetry done with patient ambulating 200 feet showed no significant decrease in O2 saturation. Lowest O2 saturation 96%. Spirometry tree today shows mild restrictive impairment possibly due to her being overweight. I cannot exclude air trapping from COPD    I did prescribe her an albuterol inhaler she can use 2 puffs 4 times a day as needed or take 2 puffs prior to any strenuous activity.

## 2023-09-26 NOTE — ASSESSMENT & PLAN NOTE
Griffin Alvarez has had excessive daytime fatigue  fatigue for past several years. Likely has significant JUAN F. She was agreeable to a home sleep study. She reports she had a sleep study done in 2012 at outside facility that was positive for sleep apnea but did not pursue any treatment.

## 2023-09-26 NOTE — ASSESSMENT & PLAN NOTE
In the states she did have a prior sleep study done in 2012 which showed JUAN F but she did not pursue any treatment. She does have daytime fatigue as evidenced by Maple sleepiness score of 16. I did discuss the diagnosis and treatment of JUAN F with her. She was agreeable to a home sleep study and I placed order for this.   She will contact my office after this is completed

## 2023-10-03 DIAGNOSIS — I10 PRIMARY HYPERTENSION: ICD-10-CM

## 2023-10-03 RX ORDER — AMLODIPINE BESYLATE 5 MG/1
5 TABLET ORAL DAILY
Qty: 90 TABLET | Refills: 1 | Status: SHIPPED | OUTPATIENT
Start: 2023-10-03

## 2023-10-03 NOTE — TELEPHONE ENCOUNTER
Medication Refill Request     Name amLODIPine (NORVASC) 5 mg tablet  Dose/Frequency Take 1 tablet (5 mg total) by mouth daily  Quantity 90  Verified pharmacy   [x]  Verified ordering Provider   [x]  Does patient have enough for the next 3 days?  Yes [x] No []

## 2023-10-27 ENCOUNTER — RA CDI HCC (OUTPATIENT)
Dept: OTHER | Facility: HOSPITAL | Age: 78
End: 2023-10-27

## 2023-11-03 ENCOUNTER — OFFICE VISIT (OUTPATIENT)
Dept: FAMILY MEDICINE CLINIC | Facility: CLINIC | Age: 78
End: 2023-11-03
Payer: MEDICARE

## 2023-11-03 ENCOUNTER — APPOINTMENT (OUTPATIENT)
Dept: LAB | Facility: CLINIC | Age: 78
End: 2023-11-03
Payer: MEDICARE

## 2023-11-03 VITALS
BODY MASS INDEX: 35 KG/M2 | RESPIRATION RATE: 18 BRPM | HEART RATE: 86 BPM | WEIGHT: 205 LBS | SYSTOLIC BLOOD PRESSURE: 110 MMHG | TEMPERATURE: 98.2 F | HEIGHT: 64 IN | DIASTOLIC BLOOD PRESSURE: 70 MMHG

## 2023-11-03 DIAGNOSIS — E11.22 TYPE 2 DIABETES MELLITUS WITH STAGE 2 CHRONIC KIDNEY DISEASE, WITHOUT LONG-TERM CURRENT USE OF INSULIN: Primary | ICD-10-CM

## 2023-11-03 DIAGNOSIS — E78.5 DYSLIPIDEMIA: ICD-10-CM

## 2023-11-03 DIAGNOSIS — I25.10 CORONARY ARTERY DISEASE INVOLVING NATIVE CORONARY ARTERY OF NATIVE HEART WITHOUT ANGINA PECTORIS: ICD-10-CM

## 2023-11-03 DIAGNOSIS — E11.22 TYPE 2 DIABETES MELLITUS WITH STAGE 2 CHRONIC KIDNEY DISEASE, WITHOUT LONG-TERM CURRENT USE OF INSULIN: ICD-10-CM

## 2023-11-03 DIAGNOSIS — R10.2 SUPRAPUBIC PRESSURE: ICD-10-CM

## 2023-11-03 DIAGNOSIS — N18.2 TYPE 2 DIABETES MELLITUS WITH STAGE 2 CHRONIC KIDNEY DISEASE, WITHOUT LONG-TERM CURRENT USE OF INSULIN: ICD-10-CM

## 2023-11-03 DIAGNOSIS — Z78.0 POST-MENOPAUSAL: ICD-10-CM

## 2023-11-03 DIAGNOSIS — Z12.31 ENCOUNTER FOR SCREENING MAMMOGRAM FOR MALIGNANT NEOPLASM OF BREAST: ICD-10-CM

## 2023-11-03 DIAGNOSIS — Z00.00 MEDICARE ANNUAL WELLNESS VISIT, SUBSEQUENT: ICD-10-CM

## 2023-11-03 DIAGNOSIS — R10.30 LOWER ABDOMINAL PAIN: ICD-10-CM

## 2023-11-03 DIAGNOSIS — I10 PRIMARY HYPERTENSION: ICD-10-CM

## 2023-11-03 DIAGNOSIS — N18.2 TYPE 2 DIABETES MELLITUS WITH STAGE 2 CHRONIC KIDNEY DISEASE, WITHOUT LONG-TERM CURRENT USE OF INSULIN: Primary | ICD-10-CM

## 2023-11-03 LAB
ALBUMIN SERPL BCP-MCNC: 4 G/DL (ref 3.5–5)
ALP SERPL-CCNC: 81 U/L (ref 34–104)
ALT SERPL W P-5'-P-CCNC: 20 U/L (ref 7–52)
ANION GAP SERPL CALCULATED.3IONS-SCNC: 6 MMOL/L
AST SERPL W P-5'-P-CCNC: 16 U/L (ref 13–39)
BACTERIA UR QL AUTO: ABNORMAL /HPF
BILIRUB SERPL-MCNC: 0.54 MG/DL (ref 0.2–1)
BILIRUB UR QL STRIP: NEGATIVE
BUN SERPL-MCNC: 14 MG/DL (ref 5–25)
CALCIUM SERPL-MCNC: 9.7 MG/DL (ref 8.4–10.2)
CHLORIDE SERPL-SCNC: 100 MMOL/L (ref 96–108)
CLARITY UR: CLEAR
CO2 SERPL-SCNC: 33 MMOL/L (ref 21–32)
COLOR UR: COLORLESS
CREAT SERPL-MCNC: 0.69 MG/DL (ref 0.6–1.3)
EST. AVERAGE GLUCOSE BLD GHB EST-MCNC: 171 MG/DL
GFR SERPL CREATININE-BSD FRML MDRD: 84 ML/MIN/1.73SQ M
GLUCOSE SERPL-MCNC: 181 MG/DL (ref 65–140)
GLUCOSE UR STRIP-MCNC: ABNORMAL MG/DL
GRAN CASTS #/AREA URNS LPF: ABNORMAL /[LPF]
HBA1C MFR BLD: 7.6 %
HGB UR QL STRIP.AUTO: NEGATIVE
KETONES UR STRIP-MCNC: NEGATIVE MG/DL
LEUKOCYTE ESTERASE UR QL STRIP: NEGATIVE
NITRITE UR QL STRIP: NEGATIVE
NON-SQ EPI CELLS URNS QL MICRO: ABNORMAL /HPF
PH UR STRIP.AUTO: 6.5 [PH]
POTASSIUM SERPL-SCNC: 3.9 MMOL/L (ref 3.5–5.3)
PROT SERPL-MCNC: 7 G/DL (ref 6.4–8.4)
PROT UR STRIP-MCNC: NEGATIVE MG/DL
RBC #/AREA URNS AUTO: ABNORMAL /HPF
SODIUM SERPL-SCNC: 139 MMOL/L (ref 135–147)
SP GR UR STRIP.AUTO: 1.01 (ref 1–1.03)
UROBILINOGEN UR STRIP-ACNC: <2 MG/DL
WBC #/AREA URNS AUTO: ABNORMAL /HPF

## 2023-11-03 PROCEDURE — 92250 FUNDUS PHOTOGRAPHY W/I&R: CPT | Performed by: NURSE PRACTITIONER

## 2023-11-03 PROCEDURE — 80053 COMPREHEN METABOLIC PANEL: CPT

## 2023-11-03 PROCEDURE — 36415 COLL VENOUS BLD VENIPUNCTURE: CPT

## 2023-11-03 PROCEDURE — G0439 PPPS, SUBSEQ VISIT: HCPCS | Performed by: NURSE PRACTITIONER

## 2023-11-03 PROCEDURE — 81001 URINALYSIS AUTO W/SCOPE: CPT | Performed by: NURSE PRACTITIONER

## 2023-11-03 PROCEDURE — 83036 HEMOGLOBIN GLYCOSYLATED A1C: CPT

## 2023-11-03 PROCEDURE — 99214 OFFICE O/P EST MOD 30 MIN: CPT | Performed by: NURSE PRACTITIONER

## 2023-11-03 NOTE — ASSESSMENT & PLAN NOTE
Complaint with medications and will check HbA1c  Lab Results   Component Value Date    HGBA1C 6.9 (H) 04/20/2023

## 2023-11-03 NOTE — PATIENT INSTRUCTIONS
Medicare Preventive Visit Patient Instructions  Thank you for completing your Welcome to Medicare Visit or Medicare Annual Wellness Visit today. Your next wellness visit will be due in one year (11/3/2024). The screening/preventive services that you may require over the next 5-10 years are detailed below. Some tests may not apply to you based off risk factors and/or age. Screening tests ordered at today's visit but not completed yet may show as past due. Also, please note that scanned in results may not display below. Preventive Screenings:  Service Recommendations Previous Testing/Comments   Colorectal Cancer Screening  * Colonoscopy    * Fecal Occult Blood Test (FOBT)/Fecal Immunochemical Test (FIT)  * Fecal DNA/Cologuard Test  * Flexible Sigmoidoscopy Age: 43-73 years old   Colonoscopy: every 10 years (may be performed more frequently if at higher risk)  OR  FOBT/FIT: every 1 year  OR  Cologuard: every 3 years  OR  Sigmoidoscopy: every 5 years  Screening may be recommended earlier than age 39 if at higher risk for colorectal cancer. Also, an individualized decision between you and your healthcare provider will decide whether screening between the ages of 77-80 would be appropriate. Colonoscopy: 10/17/2022  FOBT/FIT: Not on file  Cologuard: Not on file  Sigmoidoscopy: Not on file          Breast Cancer Screening Age: 36 years old  Frequency: every 1-2 years  Not required if history of left and right mastectomy Mammogram: Not on file        Cervical Cancer Screening Between the ages of 21-29, pap smear recommended once every 3 years. Between the ages of 32-69, can perform pap smear with HPV co-testing every 5 years.    Recommendations may differ for women with a history of total hysterectomy, cervical cancer, or abnormal pap smears in past. Pap Smear: Not on file    Screening Not Indicated   Hepatitis C Screening Once for adults born between [address] [place] [address]  More frequently in patients at high risk for Hepatitis C Hep C Antibody: 10/14/2022    Screening Current   Diabetes Screening 1-2 times per year if you're at risk for diabetes or have pre-diabetes Fasting glucose: 137 mg/dL (4/20/2023)  A1C: 6.9 % (4/20/2023)  Screening Not Indicated  History Diabetes   Cholesterol Screening Once every 5 years if you don't have a lipid disorder. May order more often based on risk factors. Lipid panel: 04/20/2023    Screening Current     Other Preventive Screenings Covered by Medicare:  Abdominal Aortic Aneurysm (AAA) Screening: covered once if your at risk. You're considered to be at risk if you have a family history of AAA. Lung Cancer Screening: covers low dose CT scan once per year if you meet all of the following conditions: (1) Age 48-67; (2) No signs or symptoms of lung cancer; (3) Current smoker or have quit smoking within the last 15 years; (4) You have a tobacco smoking history of at least 20 pack years (packs per day multiplied by number of years you smoked); (5) You get a written order from a healthcare provider. Glaucoma Screening: covered annually if you're considered high risk: (1) You have diabetes OR (2) Family history of glaucoma OR (3)  aged 48 and older OR (3)  American aged 72 and older  Osteoporosis Screening: covered every 2 years if you meet one of the following conditions: (1) You're estrogen deficient and at risk for osteoporosis based off medical history and other findings; (2) Have a vertebral abnormality; (3) On glucocorticoid therapy for more than 3 months; (4) Have primary hyperparathyroidism; (5) On osteoporosis medications and need to assess response to drug therapy. Last bone density test (DXA Scan): Not on file. HIV Screening: covered annually if you're between the age of 14-79. Also covered annually if you are younger than 13 and older than 72 with risk factors for HIV infection.  For pregnant patients, it is covered up to 3 times per pregnancy. Immunizations:  Immunization Recommendations   Influenza Vaccine Annual influenza vaccination during flu season is recommended for all persons aged >= 6 months who do not have contraindications   Pneumococcal Vaccine   * Pneumococcal conjugate vaccine = PCV13 (Prevnar 13), PCV15 (Vaxneuvance), PCV20 (Prevnar 20)  * Pneumococcal polysaccharide vaccine = PPSV23 (Pneumovax) Adults 40-93 yo with certain risk factors or if 69+ yo  If never received any pneumonia vaccine: recommend Prevnar 20 (PCV20)  Give PCV20 if previously received 1 dose of PCV13 or PPSV23   Hepatitis B Vaccine 3 dose series if at intermediate or high risk (ex: diabetes, end stage renal disease, liver disease)   Respiratory syncytial virus (RSV) Vaccine - COVERED BY MEDICARE PART D  * RSVPreF3 (Arexvy) CDC recommends that adults 61years of age and older may receive a single dose of RSV vaccine using shared clinical decision-making (SCDM)   Tetanus (Td) Vaccine - COST NOT COVERED BY MEDICARE PART B Following completion of primary series, a booster dose should be given every 10 years to maintain immunity against tetanus. Td may also be given as tetanus wound prophylaxis. Tdap Vaccine - COST NOT COVERED BY MEDICARE PART B Recommended at least once for all adults. For pregnant patients, recommended with each pregnancy. Shingles Vaccine (Shingrix) - COST NOT COVERED BY MEDICARE PART B  2 shot series recommended in those 19 years and older who have or will have weakened immune systems or those 50 years and older     Health Maintenance Due:      Topic Date Due   • Breast Cancer Screening: Mammogram  Never done   • Lung Cancer Screening  Never done   • Hepatitis C Screening  Completed   • Colorectal Cancer Screening  Discontinued     Immunizations Due:      Topic Date Due   • COVID-19 Vaccine (3 - Pfizer series) 05/24/2021   • Influenza Vaccine (1) Never done     Advance Directives   What are advance directives?   Advance directives are legal documents that state your wishes and plans for medical care. These plans are made ahead of time in case you lose your ability to make decisions for yourself. Advance directives can apply to any medical decision, such as the treatments you want, and if you want to donate organs. What are the types of advance directives? There are many types of advance directives, and each state has rules about how to use them. You may choose a combination of any of the following:  Living will: This is a written record of the treatment you want. You can also choose which treatments you do not want, which to limit, and which to stop at a certain time. This includes surgery, medicine, IV fluid, and tube feedings. Durable power of  for [facility]): This is a written record that states who you want to make healthcare choices for you when you are unable to make them for yourself. This person, called a proxy, is usually a family member or a friend. You may choose more than 1 proxy. Do not resuscitate (DNR) order:  A DNR order is used in case your heart stops beating or you stop breathing. It is a request not to have certain forms of treatment, such as CPR. A DNR order may be included in other types of advance directives. Medical directive: This covers the care that you want if you are in a coma, near death, or unable to make decisions for yourself. You can list the treatments you want for each condition. Treatment may include pain medicine, surgery, blood transfusions, dialysis, IV or tube feedings, and a ventilator (breathing machine). Values history: This document has questions about your views, beliefs, and how you feel and think about life. This information can help others choose the care that you would choose. Why are advance directives important? An advance directive helps you control your care. Although spoken wishes may be used, it is better to have your wishes written down.  Spoken wishes can be misunderstood, or not followed. Treatments may be given even if you do not want them. An advance directive may make it easier for your family to make difficult choices about your care. Urinary Incontinence   Urinary incontinence (UI)  is when you lose control of your bladder. UI develops because your bladder cannot store or empty urine properly. The 3 most common types of UI are stress incontinence, urge incontinence, or both. Medicines:   May be given to help strengthen your bladder control. Report any side effects of medication to your healthcare provider. Do pelvic muscle exercises often:  Your pelvic muscles help you stop urinating. Squeeze these muscles tight for 5 seconds, then relax for 5 seconds. Gradually work up to squeezing for 10 seconds. Do 3 sets of 15 repetitions a day, or as directed. This will help strengthen your pelvic muscles and improve bladder control. Train your bladder:  Go to the bathroom at set times, such as every 2 hours, even if you do not feel the urge to go. You can also try to hold your urine when you feel the urge to go. For example, hold your urine for 5 minutes when you feel the urge to go. As that becomes easier, hold your urine for 10 minutes. Self-care:   Keep a UI record. Write down how often you leak urine and how much you leak. Make a note of what you were doing when you leaked urine. Drink liquids as directed. You may need to limit the amount of liquid you drink to help control your urine leakage. Do not drink any liquid right before you go to bed. Limit or do not have drinks that contain caffeine or alcohol. Prevent constipation. Eat a variety of high-fiber foods. Good examples are high-fiber cereals, beans, vegetables, and whole-grain breads. Walking is the best way to trigger your intestines to have a bowel movement. Exercise regularly and maintain a healthy weight.   Weight loss and exercise will decrease pressure on your bladder and help you control your leakage. Use a catheter as directed  to help empty your bladder. A catheter is a tiny, plastic tube that is put into your bladder to drain your urine. Go to behavior therapy as directed. Behavior therapy may be used to help you learn to control your urge to urinate. Weight Management   Why it is important to manage your weight:  Being overweight increases your risk of health conditions such as heart disease, high blood pressure, type 2 diabetes, and certain types of cancer. It can also increase your risk for osteoarthritis, sleep apnea, and other respiratory problems. Aim for a slow, steady weight loss. Even a small amount of weight loss can lower your risk of health problems. How to lose weight safely:  A safe and healthy way to lose weight is to eat fewer calories and get regular exercise. You can lose up about 1 pound a week by decreasing the number of calories you eat by 500 calories each day. Healthy meal plan for weight management:  A healthy meal plan includes a variety of foods, contains fewer calories, and helps you stay healthy. A healthy meal plan includes the following:  Eat whole-grain foods more often. A healthy meal plan should contain fiber. Fiber is the part of grains, fruits, and vegetables that is not broken down by your body. Whole-grain foods are healthy and provide extra fiber in your diet. Some examples of whole-grain foods are whole-wheat breads and pastas, oatmeal, brown rice, and bulgur. Eat a variety of vegetables every day. Include dark, leafy greens such as spinach, kale, yonathan greens, and mustard greens. Eat yellow and orange vegetables such as carrots, sweet potatoes, and winter squash. Eat a variety of fruits every day. Choose fresh or canned fruit (canned in its own juice or light syrup) instead of juice. Fruit juice has very little or no fiber. Eat low-fat dairy foods. Drink fat-free (skim) milk or 1% milk. Eat fat-free yogurt and low-fat cottage cheese.  Try low-fat cheeses such as mozzarella and other reduced-fat cheeses. Choose meat and other protein foods that are low in fat. Choose beans or other legumes such as split peas or lentils. Choose fish, skinless poultry (chicken or turkey), or lean cuts of red meat (beef or pork). Before you cook meat or poultry, cut off any visible fat. Use less fat and oil. Try baking foods instead of frying them. Add less fat, such as margarine, sour cream, regular salad dressing and mayonnaise to foods. Eat fewer high-fat foods. Some examples of high-fat foods include french fries, doughnuts, ice cream, and cakes. Eat fewer sweets. Limit foods and drinks that are high in sugar. This includes candy, cookies, regular soda, and sweetened drinks. Exercise:  Exercise at least 30 minutes per day on most days of the week. Some examples of exercise include walking, biking, dancing, and swimming. You can also fit in more physical activity by taking the stairs instead of the elevator or parking farther away from stores. Ask your healthcare provider about the best exercise plan for you. © Copyright [employer] 2018 Information is for End User's use only and may not be sold, redistributed or otherwise used for commercial purposes.  All illustrations and images included in CareNotes® are the copyrighted property of [employer] or [address] [place] [address]

## 2023-11-03 NOTE — PROGRESS NOTES
Assessment and Plan:     Problem List Items Addressed This Visit        Endocrine    Type 2 diabetes mellitus with kidney complication, without long-term current use of insulin ([address]) - Primary     Complaint with medications and will check HbA1c  Lab Results   Component Value Date    HGBA1C 6.9 (H) 04/20/2023            Relevant Orders    IRIS Diabetic eye exam       Cardiovascular and Mediastinum    Coronary artery disease involving native coronary artery of native heart without angina pectoris     Managed by cardiologist and complaint with regimen            Other    Dyslipidemia     Complaint with statin and tolerating it well        Other Visit Diagnoses     Medicare annual wellness visit, subsequent        Encounter for screening mammogram for malignant neoplasm of breast        Relevant Orders    Mammo screening bilateral w 3d & cad    Post-menopausal        Relevant Orders    DXA bone density spine hip and pelvis    Lower abdominal pain        Relevant Orders    US pelvis complete w transvaginal    US kidney and bladder    Urinalysis with microscopic    Suprapubic pressure        Relevant Orders    US pelvis complete w transvaginal    US kidney and bladder    Urinalysis with microscopic        BMI Counseling: Body mass index is 34.91 kg/m². The BMI is above normal. Nutrition recommendations include decreasing portion sizes, encouraging healthy choices of fruits and vegetables, decreasing fast food intake, consuming healthier snacks, limiting drinks that contain sugar, moderation in carbohydrate intake, increasing intake of lean protein, reducing intake of saturated and trans fat and reducing intake of cholesterol. Rationale for BMI follow-up plan is due to patient being overweight or obese. Depression Screening and Follow-up Plan: Patient was screened for depression during today's encounter. They screened negative with a PHQ-2 score of 1.       Preventive health issues were discussed with patient, and age appropriate screening tests were ordered as noted in patient's After Visit Summary. Personalized health advice and appropriate referrals for health education or preventive services given if needed, as noted in patient's After Visit Summary. History of Present Illness:     Patient presents for a Medicare Wellness Visit    Patient is here to follow up on chronic conditions and AWV. Complaint with medications and tolerating it well. Stated that having lower abdominal discomfort on and off from couple of months. Had colonoscopy last year and was unremarkable. Denies any vaginal bleeding and urination issues and stated that at times has constipation but not every day. Has h/o smoking, will do UA and ultrasound of pelvis and kidney and bladder. Due for CMP and HbA1c and have not done yet and will do it today. Counseled and Will schedule her CT lung/mammogram and dexa scan  Stated that uptodate on flu vaccine as already received    Diabetes       Patient Care Team:  Katy Keen as PCP - General (Family Medicine)     Review of Systems:     Review of Systems   Constitutional:  Negative for appetite change, chills, fever and unexpected weight change. Respiratory: Negative. Cardiovascular: Negative. Gastrointestinal:  Positive for abdominal pain. Negative for anal bleeding, blood in stool, diarrhea, nausea, rectal pain and vomiting. As noted in HPI       Genitourinary: Negative. Skin: Negative.          Problem List:     Patient Active Problem List   Diagnosis   • Coronary artery disease involving native coronary artery of native heart without angina pectoris   • Dyslipidemia   • Type 2 diabetes mellitus with kidney complication, without long-term current use of insulin (HCC)   • Primary hypertension   • Angina of effort   • SOBOE (shortness of breath on exertion)   • History of non-ST elevation myocardial infarction (NSTEMI)   • Metabolic syndrome   • Myocardial infarction [facility])   • Chronic obstructive pulmonary disease, unspecified COPD type ([address] [facility])   • Daytime sleepiness   • Hypersomnia   • Personal history of nicotine dependence   • Class 1 obesity due to excess calories with serious comorbidity and body mass index (BMI) of 33.0 to 33.9 in adult      Past Medical and Surgical History:     Past Medical History:   Diagnosis Date   • Coronary artery disease     pt has 6 stents total-2012 x1, 12-x3, 1/9/13 x2   • Diabetes mellitus ([facility])    • GERD (gastroesophageal reflux disease)    • High cholesterol    • Hypertension    • Myocardial infarction ([facility]) 03/2012    x1 stent   • Shortness of breath     with exertion     Past Surgical History:   Procedure Laterality Date   • CARPAL TUNNEL RELEASE Right    • CORONARY ANGIOPLASTY WITH STENT PLACEMENT      x6 stents total   • FOOT SURGERY      bunionectomy   • GANGLION CYST EXCISION Right       Family History:     Family History   Problem Relation Age of Onset   • Diabetes Mother    • Arthritis Mother       Social History:     Social History     Socioeconomic History   • Marital status: /Civil Union     Spouse name: None   • Number of children: None   • Years of education: None   • Highest education level: None   Occupational History   • None   Tobacco Use   • Smoking status: Former     Packs/day: 0.50     Years: 45.00     Total pack years: 22.50     Types: Cigarettes     Start date: 6/15/1967     Quit date: 6/15/2012     Years since quittin.3   • Smokeless tobacco: Former     Quit date: 2012   Vaping Use   • Vaping Use: Never used   Substance and Sexual Activity   • Alcohol use: Yes     Comment: rare   • Drug use: Never   • Sexual activity: None   Other Topics Concern   • None   Social History Narrative   • None     Social Determinants of Health     Financial Resource Strain: Low Risk  (11/3/2023)    Overall Financial Resource Strain (CARDIA)    • Difficulty of Paying Living Expenses: Not hard at all   Food Insecurity: Not on file Transportation Needs: No Transportation Needs (11/3/2023)    PRAPARE - Transportation    • Lack of Transportation (Medical): No    • Lack of Transportation (Non-Medical): No   Physical Activity: Not on file   Stress: Not on file   Social Connections: Not on file   Intimate Partner Violence: Not on file   Housing Stability: Not on file      Medications and Allergies:     Current Outpatient Medications   Medication Sig Dispense Refill   • albuterol (ProAir HFA) 90 mcg/act inhaler Inhale 2 puffs every 4 (four) hours as needed for wheezing or shortness of breath 8.5 g 7   • amLODIPine (NORVASC) 5 mg tablet Take 1 tablet (5 mg total) by mouth daily 90 tablet 1   • ASPIRIN 81 PO Take 81 mg by mouth every morning     • atorvastatin (LIPITOR) 80 mg tablet Take 1 tablet (80 mg total) by mouth daily 90 tablet 3   • Calcium Carbonate (CALTRATE 600 PO) Take by mouth in the morning     • carvedilol (COREG) 12.5 mg tablet Take 1 tablet (12.5 mg total) by mouth 2 (two) times a day with meals 180 tablet 3   • cetirizine (ZyrTEC) 10 mg tablet Take 10 mg by mouth daily     • esomeprazole (NexIUM) 40 MG capsule Take 40 mg by mouth every morning     • meclizine (ANTIVERT) 12.5 MG tablet Take 1 tablet (12.5 mg total) by mouth 3 (three) times a day as needed for dizziness 30 tablet 0   • metFORMIN (GLUCOPHAGE-XR) 750 mg 24 hr tablet Take 1 tablet (750 mg total) by mouth 2 (two) times a day with meals 180 tablet 1   • nitroglycerin (NITROSTAT) 0.4 mg SL tablet Place 0.4 mg under the tongue every 5 (five) minutes as needed     • ramipril (ALTACE) 10 MG capsule Take 1 capsule (10 mg total) by mouth daily at bedtime 90 capsule 3   • Triprolidine-Pseudoephedrine (ANTIHISTAMINE PO) Take by mouth daily at bedtime     • furosemide (LASIX) 20 mg tablet Take 1 tablet (20 mg total) by mouth daily (Patient not taking: Reported on 9/25/2023) 30 tablet 1     No current facility-administered medications for this visit.      Allergies   Allergen Reactions   • Prasugrel Rash     effient      Immunizations:     Immunization History   Administered Date(s) Administered   • COVID-19 PFIZER VACCINE 0.3 ML IM 03/08/2021, 03/29/2021   • Pneumococcal Conjugate Vaccine 20-valent (Pcv20), Polysace 06/30/2022      Health Maintenance:         Topic Date Due   • Breast Cancer Screening: Mammogram  Never done   • Lung Cancer Screening  Never done   • Hepatitis C Screening  Completed   • Colorectal Cancer Screening  Discontinued         Topic Date Due   • COVID-19 Vaccine (3 - Pfizer series) 05/24/2021   • Influenza Vaccine (1) Never done      Medicare Screening Tests and Risk Assessments:     Evelyn River is here for her Subsequent Wellness visit. Last Medicare Wellness visit information reviewed, patient interviewed and updates made to the record today. Health Risk Assessment:   Patient rates overall health as good. Patient feels that their physical health rating is same. Patient is satisfied with their life. Eyesight was rated as slightly worse. Hearing was rated as same. Patient feels that their emotional and mental health rating is same. Patients states they are never, rarely angry. Patient states they are often unusually tired/fatigued. Pain experienced in the last 7 days has been some. Patient's pain rating has been 5/10. Patient states that she has experienced no weight loss or gain in last 6 months. Depression Screening:   PHQ-2 Score: 1      Fall Risk Screening: In the past year, patient has experienced: no history of falling in past year      Urinary Incontinence Screening:   Patient has leaked urine accidently in the last six months. Home Safety:  Patient does not have trouble with stairs inside or outside of their home. Patient has working smoke alarms and has working carbon monoxide detector. Home safety hazards include: loose rugs on the floor. Nutrition:   Current diet is Regular, No Added Salt and Low Carb.      Medications:   Patient is currently taking over-the-counter supplements. OTC medications include: see medication list. Patient is able to manage medications. Activities of Daily Living (ADLs)/Instrumental Activities of Daily Living (IADLs):   Walk and transfer into and out of bed and chair?: Yes  Dress and groom yourself?: Yes    Bathe or shower yourself?: Yes    Feed yourself? Yes  Do your laundry/housekeeping?: Yes  Manage your money, pay your bills and track your expenses?: Yes  Make your own meals?: Yes    Do your own shopping?: Yes    Previous Hospitalizations:   Any hospitalizations or ED visits within the last 12 months?: No      Advance Care Planning:   Living will: Yes    Durable POA for healthcare: Yes    Advanced directive: Yes    Advanced directive counseling given: Yes    ACP document given: Yes    Patient declined ACP directive: No      Cognitive Screening:   Provider or family/friend/caregiver concerned regarding cognition?: No    PREVENTIVE SCREENINGS      Cardiovascular Screening:    General: Screening Current      Diabetes Screening:     General: Screening Not Indicated and History Diabetes      Colorectal Cancer Screening:     General: Screening Current      Breast Cancer Screening:     General: Risks and Benefits Discussed    Due for: Mammogram        Cervical Cancer Screening:    General: Screening Not Indicated      Osteoporosis Screening:    General: Risks and Benefits Discussed    Due for: DXA Axial      Abdominal Aortic Aneurysm (AAA) Screening:        General: Screening Not Indicated      Lung Cancer Screening:     General: Risks and Benefits Discussed    Due for: Low Dose CT (LDCT)      Hepatitis C Screening:    General: Screening Current    Screening, Brief Intervention, and Referral to Treatment (SBIRT)    Screening  Typical number of drinks in a day: 0  Typical number of drinks in a week: 0  Interpretation: Low risk drinking behavior.     AUDIT-C Screenin) How often did you have a drink containing alcohol in the past year? never  2) How many drinks did you have on a typical day when you were drinking in the past year? 0  3) How often did you have 6 or more drinks on one occasion in the past year? never    AUDIT-C Score: 0  Interpretation: Score 0-2 (female): Negative screen for alcohol misuse    Single Item Drug Screening:  How often have you used an illegal drug (including marijuana) or a prescription medication for non-medical reasons in the past year? never    Single Item Drug Screen Score: 0  Interpretation: Negative screen for possible drug use disorder    Brief Intervention  Alcohol & drug use screenings were reviewed. No concerns regarding substance use disorder identified. Other Counseling Topics:   Car/seat belt/driving safety, skin self-exam, sunscreen and calcium and vitamin D intake and regular weightbearing exercise. No results found. Physical Exam:     /70   Pulse 86   Temp 98.2 °F (36.8 °C)   Resp 18   Ht 5' 4.25" (1.632 m)   Wt 93 kg (205 lb)   BMI 34.91 kg/m²     Physical Exam  Constitutional:       Appearance: Normal appearance. She is well-developed. HENT:      Head: Normocephalic. Right Ear: External ear normal.      Left Ear: External ear normal.      Nose: Nose normal.   Eyes:      General:         Right eye: No discharge. Left eye: No discharge. Conjunctiva/sclera: Conjunctivae normal.   Cardiovascular:      Rate and Rhythm: Normal rate and regular rhythm. Heart sounds: Normal heart sounds. No murmur heard. Pulmonary:      Effort: Pulmonary effort is normal.      Breath sounds: Normal breath sounds. Abdominal:      General: Bowel sounds are normal. There is no distension. Palpations: Abdomen is soft. There is no mass. Tenderness: There is abdominal tenderness (lower abdominal area). There is no guarding or rebound. Musculoskeletal:         General: No tenderness. Normal range of motion.       Cervical back: Normal range of motion and neck supple. Lymphadenopathy:      Cervical: No cervical adenopathy. Right cervical: No superficial or posterior cervical adenopathy. Left cervical: No superficial or posterior cervical adenopathy. Upper Body:      Right upper body: No supraclavicular adenopathy. Left upper body: No supraclavicular adenopathy. Skin:     General: Skin is warm and dry. Findings: No rash. Neurological:      Mental Status: She is alert and oriented to person, place, and time. GCS: GCS eye subscore is 4. GCS verbal subscore is 5. GCS motor subscore is 6. Sensory: No sensory deficit. Coordination: Coordination normal.      Gait: Gait normal.   Psychiatric:         Behavior: Behavior normal.         Thought Content:  Thought content normal.         Judgment: Judgment normal.          DAVID Prabhakar

## 2023-11-04 ENCOUNTER — TELEPHONE (OUTPATIENT)
Dept: FAMILY MEDICINE CLINIC | Facility: CLINIC | Age: 78
End: 2023-11-04

## 2023-11-04 DIAGNOSIS — Z13.29 SCREENING FOR THYROID DISORDER: ICD-10-CM

## 2023-11-04 DIAGNOSIS — I25.10 CORONARY ARTERY DISEASE INVOLVING NATIVE CORONARY ARTERY OF NATIVE HEART WITHOUT ANGINA PECTORIS: ICD-10-CM

## 2023-11-04 DIAGNOSIS — N18.2 TYPE 2 DIABETES MELLITUS WITH STAGE 2 CHRONIC KIDNEY DISEASE, WITHOUT LONG-TERM CURRENT USE OF INSULIN: Primary | ICD-10-CM

## 2023-11-04 DIAGNOSIS — Z13.0 SCREENING FOR DEFICIENCY ANEMIA: ICD-10-CM

## 2023-11-04 DIAGNOSIS — E78.5 DYSLIPIDEMIA: ICD-10-CM

## 2023-11-04 DIAGNOSIS — E11.22 TYPE 2 DIABETES MELLITUS WITH STAGE 2 CHRONIC KIDNEY DISEASE, WITHOUT LONG-TERM CURRENT USE OF INSULIN: Primary | ICD-10-CM

## 2023-11-04 RX ORDER — METFORMIN HYDROCHLORIDE 500 MG/1
1000 TABLET, EXTENDED RELEASE ORAL
Qty: 180 TABLET | Refills: 1 | Status: SHIPPED | OUTPATIENT
Start: 2023-11-04 | End: 2024-05-02

## 2023-11-04 NOTE — TELEPHONE ENCOUNTER
Patient called and blood work and urine test results discussed. Will increase metformin to 1000mg BID and will call back for any issues and will repeat blood work after 3 months. Will schedule ultrasound pelvis and kidneys.  DAVID Freeman

## 2023-11-06 DIAGNOSIS — E11.22 TYPE 2 DIABETES MELLITUS WITH STAGE 2 CHRONIC KIDNEY DISEASE, WITHOUT LONG-TERM CURRENT USE OF INSULIN: ICD-10-CM

## 2023-11-06 DIAGNOSIS — N18.2 TYPE 2 DIABETES MELLITUS WITH STAGE 2 CHRONIC KIDNEY DISEASE, WITHOUT LONG-TERM CURRENT USE OF INSULIN: ICD-10-CM

## 2023-11-06 RX ORDER — METFORMIN HYDROCHLORIDE 500 MG/1
1000 TABLET, EXTENDED RELEASE ORAL 2 TIMES DAILY WITH MEALS
Qty: 360 TABLET | Refills: 1 | Status: SHIPPED | OUTPATIENT
Start: 2023-11-06 | End: 2024-05-04

## 2023-11-07 LAB
LEFT EYE DIABETIC RETINOPATHY: NORMAL
LEFT EYE IMAGE QUALITY: NORMAL
LEFT EYE MACULAR EDEMA: NORMAL
LEFT EYE OTHER RETINOPATHY: NORMAL
RIGHT EYE DIABETIC RETINOPATHY: NORMAL
RIGHT EYE IMAGE QUALITY: NORMAL
RIGHT EYE MACULAR EDEMA: NORMAL
RIGHT EYE OTHER RETINOPATHY: NORMAL
SEVERITY (EYE EXAM): NORMAL

## 2023-11-10 ENCOUNTER — HOSPITAL ENCOUNTER (OUTPATIENT)
Dept: RADIOLOGY | Facility: HOSPITAL | Age: 78
Discharge: HOME | End: 2023-11-10
Payer: MEDICARE

## 2023-11-10 DIAGNOSIS — R10.30 LOWER ABDOMINAL PAIN: ICD-10-CM

## 2023-11-10 DIAGNOSIS — R10.2 SUPRAPUBIC PRESSURE: ICD-10-CM

## 2023-11-10 PROCEDURE — 76830 TRANSVAGINAL US NON-OB: CPT

## 2023-11-10 PROCEDURE — 76775 US EXAM ABDO BACK WALL LIM: CPT

## 2023-11-10 PROCEDURE — 76856 US EXAM PELVIC COMPLETE: CPT

## 2023-11-16 ENCOUNTER — TELEPHONE (OUTPATIENT)
Dept: FAMILY MEDICINE CLINIC | Facility: CLINIC | Age: 78
End: 2023-11-16

## 2023-11-16 DIAGNOSIS — N28.89 RIGHT KIDNEY MASS: ICD-10-CM

## 2023-11-16 DIAGNOSIS — R93.89 ENDOMETRIAL THICKENING ON ULTRASOUND: Primary | ICD-10-CM

## 2023-11-16 NOTE — TELEPHONE ENCOUNTER
Patient called and results of ultrasound kidney and bladder and US pelvis discussed. CT renal ordered as recommended by radiologist to follow on cystic mass on right kidney. Patient stated that leaving for vacation tomorrow and will do CT around 12/10/2024. Also US pelvis showed borderline endometrial thickening and will follow with gynecologist and currently does not have any vaginal bleeding.  DAVID Spencer

## 2023-11-17 ENCOUNTER — TELEPHONE (OUTPATIENT)
Age: 78
End: 2023-11-17

## 2023-11-17 NOTE — TELEPHONE ENCOUNTER
Pt schedule [facility], per her request. First available 1/25/24, appt made, but offered to schedule her sooner at another office, pt declined

## 2023-12-12 ENCOUNTER — HOSPITAL ENCOUNTER (OUTPATIENT)
Dept: RADIOLOGY | Facility: HOSPITAL | Age: 78
Discharge: HOME | End: 2023-12-12
Payer: MEDICARE

## 2023-12-12 DIAGNOSIS — N28.89 RIGHT KIDNEY MASS: ICD-10-CM

## 2023-12-12 PROCEDURE — G1004 CDSM NDSC: HCPCS

## 2023-12-12 PROCEDURE — 74178 CT ABD&PLV WO CNTR FLWD CNTR: CPT

## 2023-12-12 RX ADMIN — IOHEXOL 100 ML: 350 INJECTION, SOLUTION INTRAVENOUS at 09:22

## 2023-12-19 DIAGNOSIS — N28.89 RIGHT KIDNEY MASS: Primary | ICD-10-CM

## 2024-01-07 ENCOUNTER — HOSPITAL ENCOUNTER (OUTPATIENT)
Dept: MRI IMAGING | Facility: HOSPITAL | Age: 79
Discharge: HOME | End: 2024-01-07
Payer: MEDICARE

## 2024-01-07 DIAGNOSIS — N28.89 RIGHT KIDNEY MASS: ICD-10-CM

## 2024-01-07 PROCEDURE — 74183 MRI ABD W/O CNTR FLWD CNTR: CPT

## 2024-01-07 PROCEDURE — A9585 GADOBUTROL INJECTION: HCPCS | Performed by: NURSE PRACTITIONER

## 2024-01-07 PROCEDURE — G1004 CDSM NDSC: HCPCS

## 2024-01-07 RX ORDER — GADOBUTROL 604.72 MG/ML
9 INJECTION INTRAVENOUS
Status: COMPLETED | OUTPATIENT
Start: 2024-01-07 | End: 2024-01-07

## 2024-01-07 RX ADMIN — GADOBUTROL 9 ML: 604.72 INJECTION INTRAVENOUS at 12:08

## 2024-01-19 DIAGNOSIS — I25.10 CORONARY ARTERY DISEASE INVOLVING NATIVE CORONARY ARTERY OF NATIVE HEART WITHOUT ANGINA PECTORIS: ICD-10-CM

## 2024-01-19 DIAGNOSIS — N18.2 TYPE 2 DIABETES MELLITUS WITH STAGE 2 CHRONIC KIDNEY DISEASE, WITHOUT LONG-TERM CURRENT USE OF INSULIN: ICD-10-CM

## 2024-01-19 DIAGNOSIS — I10 PRIMARY HYPERTENSION: ICD-10-CM

## 2024-01-19 DIAGNOSIS — E11.22 TYPE 2 DIABETES MELLITUS WITH STAGE 2 CHRONIC KIDNEY DISEASE, WITHOUT LONG-TERM CURRENT USE OF INSULIN: ICD-10-CM

## 2024-01-19 RX ORDER — AMLODIPINE BESYLATE 5 MG/1
5 TABLET ORAL DAILY
Qty: 90 TABLET | Refills: 3 | Status: SHIPPED | OUTPATIENT
Start: 2024-01-19 | End: 2025-01-13

## 2024-01-19 RX ORDER — CARVEDILOL 12.5 MG/1
12.5 TABLET ORAL 2 TIMES DAILY WITH MEALS
Qty: 180 TABLET | Refills: 3 | Status: SHIPPED | OUTPATIENT
Start: 2024-01-19 | End: 2025-01-13

## 2024-01-19 RX ORDER — RAMIPRIL 10 MG/1
10 CAPSULE ORAL
Qty: 90 CAPSULE | Refills: 3 | Status: SHIPPED | OUTPATIENT
Start: 2024-01-19 | End: 2025-01-13

## 2024-01-19 RX ORDER — ATORVASTATIN CALCIUM 80 MG/1
80 TABLET, FILM COATED ORAL DAILY
Qty: 90 TABLET | Refills: 3 | Status: SHIPPED | OUTPATIENT
Start: 2024-01-19 | End: 2025-01-13

## 2024-01-19 RX ORDER — METFORMIN HYDROCHLORIDE 500 MG/1
1000 TABLET, EXTENDED RELEASE ORAL 2 TIMES DAILY WITH MEALS
Qty: 360 TABLET | Refills: 1 | Status: SHIPPED | OUTPATIENT
Start: 2024-01-19 | End: 2024-01-25 | Stop reason: SDUPTHER

## 2024-01-19 RX ORDER — METFORMIN HYDROCHLORIDE 500 MG/1
1000 TABLET, EXTENDED RELEASE ORAL 2 TIMES DAILY WITH MEALS
Qty: 360 TABLET | Refills: 1 | Status: CANCELLED | OUTPATIENT
Start: 2024-01-19 | End: 2024-07-17

## 2024-01-19 NOTE — TELEPHONE ENCOUNTER
Patient stated that as of January 1, 2024 pt has a new prescription plan:   [facility] Value Script PDP    ID# [id number] and requested refills for the       metFORMIN (GLUCOPHAGE-XR) 500 mg 24 hr tablet, amLODIPine (NORVASC) 5 mg tablet, atorvastatin (LIPITOR) 80 mg tablet, ramipril (ALTACE) 10 MG capsule, carvedilol (COREG) 12.5 mg tablet .         Please send to: [facility] mail order      Please contact patient and confirm before sending. Thank you for your assistance.

## 2024-01-25 ENCOUNTER — OFFICE VISIT (OUTPATIENT)
Dept: OBGYN CLINIC | Facility: CLINIC | Age: 79
End: 2024-01-25
Payer: MEDICARE

## 2024-01-25 VITALS — DIASTOLIC BLOOD PRESSURE: 70 MMHG | WEIGHT: 207 LBS | SYSTOLIC BLOOD PRESSURE: 118 MMHG | BODY MASS INDEX: 35.26 KG/M2

## 2024-01-25 DIAGNOSIS — N81.83 WEAKENING OF RECTOVAGINAL TISSUE: Primary | ICD-10-CM

## 2024-01-25 DIAGNOSIS — R93.89 ENDOMETRIAL THICKENING ON ULTRASOUND: ICD-10-CM

## 2024-01-25 DIAGNOSIS — N95.2 VAGINAL ATROPHY: ICD-10-CM

## 2024-01-25 DIAGNOSIS — N94.10 FEMALE DYSPAREUNIA: ICD-10-CM

## 2024-01-25 DIAGNOSIS — E11.22 TYPE 2 DIABETES MELLITUS WITH STAGE 2 CHRONIC KIDNEY DISEASE, WITHOUT LONG-TERM CURRENT USE OF INSULIN: ICD-10-CM

## 2024-01-25 DIAGNOSIS — N18.2 TYPE 2 DIABETES MELLITUS WITH STAGE 2 CHRONIC KIDNEY DISEASE, WITHOUT LONG-TERM CURRENT USE OF INSULIN: ICD-10-CM

## 2024-01-25 PROCEDURE — 99203 OFFICE O/P NEW LOW 30 MIN: CPT | Performed by: NURSE PRACTITIONER

## 2024-01-25 RX ORDER — METFORMIN HYDROCHLORIDE 500 MG/1
1000 TABLET, EXTENDED RELEASE ORAL 2 TIMES DAILY WITH MEALS
Qty: 360 TABLET | Refills: 1 | Status: SHIPPED | OUTPATIENT
Start: 2024-01-25 | End: 2024-07-23

## 2024-01-25 RX ORDER — ESTRADIOL 0.1 MG/G
CREAM VAGINAL
Qty: 42.5 G | Refills: 1 | Status: SHIPPED | OUTPATIENT
Start: 2024-01-25

## 2024-01-25 NOTE — PROGRESS NOTES
Assessment/Plan:    No problem-specific Assessment & Plan notes found for this encounter.       Diagnoses and all orders for this visit:    Weakening of rectovaginal tissue  -     Ambulatory Referral to Urogynecology; Future    Endometrial thickening on ultrasound  -     Ambulatory Referral to Obstetrics / Gynecology  -     US pelvis complete w transvaginal; Future    Vaginal atrophy  -     estradiol (ESTRACE VAGINAL) 0.1 mg/g vaginal cream; Apply pea size amount around the opening of the vagina daily x 14 days, followed by twice weekly.    Female dyspareunia  -     estradiol (ESTRACE VAGINAL) 0.1 mg/g vaginal cream; Apply pea size amount around the opening of the vagina daily x 14 days, followed by twice weekly.          Exam findings reviewed with patient re: cytocele/rectocele which is causing her to feel constant pressure and the urge to urinate. Recommended evaluation/treatment w/urogyn. She is agreeable. Referral provided.    As for the US findings. Given that she has not experienced any vaginal bleeding and her lining is 1 mm above what is considered normal for a postmenopausal women, I recommend we re-evaluate with a pelvic US in 6 months to see if there is a change in the thickness of the lining.  If lining is noted to increase in thickness then we will discuss obtaining a tissue sample of the lining at that time.     For the dyspareunia and vaginal itching we discussed the option of using a small amount of topical estrogen externally.  I explained that when estrogen production diminishes the vaginal canal shortens and narrows. The quantity and quality of vaginal secretions declines. There is also a decline of collagen, adipose and water-retaining ability to vulva. The vaginal walls become thinner, less elastic and pale with loss of rugation. The vaginal surface becomes friable with petechiae, ulcerations and bleeding often occurring after minimal trauma (intimacy). Topical estrogen helps alleviate the  symptoms of pain and itching. She is agreeable to try the topical estrogen.     Apply small pea size amount around the opening of the vaginal daily for 2 weeks, followed by twice weekly. Continue use of water-based lubricant or coconut oil with intimacy. Follow-up in 3 months.     Subjective:      Patient ID: Meka See is a 78 y.o. female. G3 G3 with  x 3. She was referred by her PCP for a incidental findings of a mildly thickened endometrium of 6 mm. Patient had a pelvic US in November for evaluation of Lower abdominal pain/pressure.  Denies vaginal bleeding. She does feel a constant pelvic/vaginal pressure with urge to urinate. She has experienced stress and urge incontinence.     She also complains of external vaginal itching occasionally and mild dyspareunia w/intimacy. They do use KY Jelly. She has tried a topical hydrocortisone cream as well.     HPI    The following portions of the patient's history were reviewed and updated as appropriate: allergies, current medications, past family history, past medical history, past social history, past surgical history, and problem list.    Review of Systems   Constitutional:  Negative for chills and fever.   Genitourinary:  Positive for dyspareunia. Negative for frequency, genital sores, vaginal bleeding, vaginal discharge and vaginal pain.        Pelvic/vaginal pressure   All other systems reviewed and are negative.        Objective:      /70 (BP Location: Right arm, Patient Position: Sitting, Cuff Size: Large)   Wt 93.9 kg (207 lb)   BMI 35.26 kg/m²          Physical Exam  Vitals and nursing note reviewed.   Constitutional:       Appearance: Normal appearance.   HENT:      Head: Normocephalic and atraumatic.   Genitourinary:     General: Normal vulva.      Exam position: Lithotomy position.      Labia:         Right: No rash, tenderness or lesion.         Left: No rash, tenderness or lesion.       Cervix: Normal.      Uterus: Normal.       Adnexa:          Right: No mass or tenderness.          Left: No mass or tenderness.        Rectum: External hemorrhoid present.      Comments: Mild cystocele and rectocele noted on exam.   Mild vaginal atrophy.   Musculoskeletal:      Cervical back: Neck supple.   Skin:     General: Skin is warm.   Neurological:      Mental Status: She is alert.   Psychiatric:         Behavior: Behavior is cooperative.

## 2024-02-22 ENCOUNTER — OFFICE VISIT (OUTPATIENT)
Dept: URGENT CARE | Facility: CLINIC | Age: 79
End: 2024-02-22
Payer: MEDICARE

## 2024-02-22 VITALS
HEART RATE: 77 BPM | DIASTOLIC BLOOD PRESSURE: 84 MMHG | TEMPERATURE: 97.2 F | WEIGHT: 209 LBS | SYSTOLIC BLOOD PRESSURE: 144 MMHG | BODY MASS INDEX: 35.6 KG/M2 | RESPIRATION RATE: 14 BRPM | OXYGEN SATURATION: 99 %

## 2024-02-22 DIAGNOSIS — J01.00 ACUTE NON-RECURRENT MAXILLARY SINUSITIS: Primary | ICD-10-CM

## 2024-02-22 PROCEDURE — 99213 OFFICE O/P EST LOW 20 MIN: CPT | Performed by: FAMILY MEDICINE

## 2024-02-22 RX ORDER — DOXYCYCLINE 100 MG/1
100 TABLET ORAL 2 TIMES DAILY
Qty: 14 TABLET | Refills: 0 | Status: SHIPPED | OUTPATIENT
Start: 2024-02-22 | End: 2024-02-29

## 2024-02-22 NOTE — PROGRESS NOTES
[facility]        NAME: Meka See is a 78 y.o. female  : 1945    MRN: [id number]  DATE: 2024  TIME: 4:37 PM    Assessment and Plan   Acute non-recurrent maxillary sinusitis [J01.00]  1. Acute non-recurrent maxillary sinusitis  doxycycline (ADOXA) 100 MG tablet            Patient Instructions     Antibiotics given for sinusitis. Recommend OTC decongestants.  Follow up with PCP in 3-5 days if no improvement. Proceed to ER if symptoms worsen.    Chief Complaint     Chief Complaint   Patient presents with   • Cold Like Symptoms     Pt presents with cough, PND, nasal congestion; started one week ago         History of Present Illness     Meka See is a 78 y.o. female presenting to the office today for facial pain and congestion. Symptoms have been present for 7 days, and include cough, congestion. She has tried nasal saline for her symptoms, with no relief.  Sick contacts include: NA    Review of Systems     Review of Systems   Constitutional:  Negative for chills, fatigue and fever.   HENT:  Positive for congestion, postnasal drip and sore throat. Negative for ear discharge, ear pain, sinus pressure and sinus pain.    Eyes:  Negative for pain and discharge.   Respiratory:  Positive for cough. Negative for shortness of breath.    Cardiovascular:  Negative for chest pain and palpitations.   Gastrointestinal:  Negative for abdominal pain, diarrhea, nausea and vomiting.   Genitourinary:  Negative for difficulty urinating and dysuria.   Musculoskeletal:  Negative for arthralgias and myalgias.   Skin:  Negative for rash.   Neurological:  Negative for dizziness, syncope, light-headedness, numbness and headaches.   Psychiatric/Behavioral:  Negative for agitation.    All other systems reviewed and are negative.      Current Medications       Current Outpatient Medications:   •  albuterol (ProAir HFA) 90 mcg/act inhaler, Inhale 2 puffs every 4 (four) hours as needed for wheezing or  shortness of breath, Disp: 8.5 g, Rfl: 7  •  amLODIPine (NORVASC) 5 mg tablet, Take 1 tablet (5 mg total) by mouth daily, Disp: 90 tablet, Rfl: 3  •  ASPIRIN 81 PO, Take 81 mg by mouth every morning, Disp: , Rfl:   •  atorvastatin (LIPITOR) 80 mg tablet, Take 1 tablet (80 mg total) by mouth daily, Disp: 90 tablet, Rfl: 3  •  Calcium Carbonate (CALTRATE 600 PO), Take by mouth in the morning, Disp: , Rfl:   •  carvedilol (COREG) 12.5 mg tablet, Take 1 tablet (12.5 mg total) by mouth 2 (two) times a day with meals, Disp: 180 tablet, Rfl: 3  •  doxycycline (ADOXA) 100 MG tablet, Take 1 tablet (100 mg total) by mouth 2 (two) times a day for 7 days, Disp: 14 tablet, Rfl: 0  •  esomeprazole (NexIUM) 40 MG capsule, Take 40 mg by mouth every morning, Disp: , Rfl:   •  estradiol (ESTRACE VAGINAL) 0.1 mg/g vaginal cream, Apply pea size amount around the opening of the vagina daily x 14 days, followed by twice weekly., Disp: 42.5 g, Rfl: 1  •  meclizine (ANTIVERT) 12.5 MG tablet, Take 1 tablet (12.5 mg total) by mouth 3 (three) times a day as needed for dizziness, Disp: 30 tablet, Rfl: 0  •  metFORMIN (GLUCOPHAGE-XR) 500 mg 24 hr tablet, Take 2 tablets (1,000 mg total) by mouth 2 (two) times a day with meals, Disp: 360 tablet, Rfl: 1  •  ramipril (ALTACE) 10 MG capsule, Take 1 capsule (10 mg total) by mouth daily at bedtime, Disp: 90 capsule, Rfl: 3  •  Triprolidine-Pseudoephedrine (ANTIHISTAMINE PO), Take by mouth daily at bedtime, Disp: , Rfl:   •  cetirizine (ZyrTEC) 10 mg tablet, Take 10 mg by mouth daily, Disp: , Rfl:   •  furosemide (LASIX) 20 mg tablet, Take 1 tablet (20 mg total) by mouth daily (Patient not taking: Reported on 9/25/2023), Disp: 30 tablet, Rfl: 1  •  nitroglycerin (NITROSTAT) 0.4 mg SL tablet, Place 0.4 mg under the tongue every 5 (five) minutes as needed, Disp: , Rfl:     Current Allergies     Allergies as of 02/22/2024 - Reviewed 02/22/2024   Allergen Reaction Noted   • Prasugrel Rash 02/09/2018             The following portions of the patient's history were reviewed and updated as appropriate: allergies, current medications, past family history, past medical history, past social history, past surgical history and problem list.     Past Medical History:   Diagnosis Date   • Coronary artery disease     pt has 6 stents total-2012 x1, 5/29/12-x3, 1/9/13 x2   • Diabetes mellitus (HCC)    • GERD (gastroesophageal reflux disease)    • High cholesterol    • Hypertension    • Myocardial infarction (HCC) 03/2012    x1 stent   • Shortness of breath     with exertion       Past Surgical History:   Procedure Laterality Date   • CARPAL TUNNEL RELEASE Right    • CORONARY ANGIOPLASTY WITH STENT PLACEMENT      x6 stents total   • FOOT SURGERY      bunionectomy   • GANGLION CYST EXCISION Right        Family History   Problem Relation Age of Onset   • Diabetes Mother    • Arthritis Mother    • Endometriosis Sister        Medications have been verified.    Objective     /84   Pulse 77   Temp (!) 97.2 °F (36.2 °C)   Resp 14   Wt 94.8 kg (209 lb)   SpO2 99%   BMI 35.60 kg/m²   No LMP recorded. Patient is postmenopausal.     Physical Exam     Physical Exam  Vitals reviewed.   Constitutional:       General: She is not in acute distress.     Appearance: Normal appearance. She is not ill-appearing.   HENT:      Head: Normocephalic and atraumatic.      Right Ear: Tympanic membrane and ear canal normal. No middle ear effusion.      Left Ear: Tympanic membrane and ear canal normal.  No middle ear effusion.      Nose:      Right Sinus: Maxillary sinus tenderness and frontal sinus tenderness present.      Left Sinus: Maxillary sinus tenderness and frontal sinus tenderness present.      Mouth/Throat:      Mouth: Mucous membranes are moist.      Pharynx: Posterior oropharyngeal erythema present. No oropharyngeal exudate.      Tonsils: No tonsillar exudate.   Eyes:      Extraocular Movements: Extraocular movements intact.       Conjunctiva/sclera: Conjunctivae normal.      Pupils: Pupils are equal, round, and reactive to light.   Cardiovascular:      Rate and Rhythm: Normal rate and regular rhythm.      Pulses: Normal pulses.      Heart sounds: Normal heart sounds. No murmur heard.  Pulmonary:      Effort: Pulmonary effort is normal. No respiratory distress.      Breath sounds: Normal breath sounds. No wheezing.   Musculoskeletal:      Cervical back: Normal range of motion and neck supple. No tenderness.   Skin:     General: Skin is warm.   Neurological:      General: No focal deficit present.      Mental Status: She is alert.   Psychiatric:         Mood and Affect: Mood normal.         Behavior: Behavior normal.         Judgment: Judgment normal.

## 2024-03-15 ENCOUNTER — APPOINTMENT (OUTPATIENT)
Dept: RADIOLOGY | Facility: CLINIC | Age: 79
End: 2024-03-15
Payer: MEDICARE

## 2024-03-15 ENCOUNTER — OFFICE VISIT (OUTPATIENT)
Dept: URGENT CARE | Facility: CLINIC | Age: 79
End: 2024-03-15
Payer: MEDICARE

## 2024-03-15 VITALS
HEART RATE: 72 BPM | OXYGEN SATURATION: 95 % | DIASTOLIC BLOOD PRESSURE: 64 MMHG | RESPIRATION RATE: 18 BRPM | WEIGHT: 209 LBS | HEIGHT: 65 IN | BODY MASS INDEX: 34.82 KG/M2 | SYSTOLIC BLOOD PRESSURE: 140 MMHG | TEMPERATURE: 97.9 F

## 2024-03-15 DIAGNOSIS — S82.001A CLOSED NONDISPLACED FRACTURE OF RIGHT PATELLA, UNSPECIFIED FRACTURE MORPHOLOGY, INITIAL ENCOUNTER: Primary | ICD-10-CM

## 2024-03-15 DIAGNOSIS — M25.561 ACUTE PAIN OF RIGHT KNEE: ICD-10-CM

## 2024-03-15 PROCEDURE — 73564 X-RAY EXAM KNEE 4 OR MORE: CPT

## 2024-03-15 PROCEDURE — 27520 TREAT KNEECAP FRACTURE: CPT

## 2024-03-15 PROCEDURE — 99213 OFFICE O/P EST LOW 20 MIN: CPT

## 2024-03-15 NOTE — PATIENT INSTRUCTIONS
Wear knee immobilizer and crutches  Follow up with orthopedics.   Go see your regular family doctor in 3-5 days.  Go to emergency room (ER) if you are getting worse.

## 2024-03-15 NOTE — PROGRESS NOTES
[facility]    NAME: Meka See is a 78 y.o. female  : 1945    MRN: [id number]  DATE: March 15, 2024  TIME: 2:11 PM    Assessment and Plan   Closed nondisplaced fracture of right patella, unspecified fracture morphology, initial encounter [S82.001A]  1. Closed nondisplaced fracture of right patella, unspecified fracture morphology, initial encounter  XR knee 4+ vw right injury    Ambulatory Referral to Orthopedic Surgery    Orthopedic injury treatment    CANCELED: XR ankle 3+ vw right    CANCELED: XR knee 4+ vw right injury        Given acute tenderness over the site  with fracture noted, unable to see previous XR to compare if similar site. Placed in knee immobilizer and crutches. Crutches for sure for long distance.   Follow up with orthopedics.   Go to ER if symptoms get worse.     Patient Instructions     Wear knee immobilizer and crutches  Follow up with orthopedics.   Go see your regular family doctor in 3-5 days.  Go to emergency room (ER) if you are getting worse.     Chief Complaint     Chief Complaint   Patient presents with    Knee Pain    Fall     Fall that occurred over a week ago, still experiencing pain         History of Present Illness       Presents with injury 1 week ago. She feel directly onto her kneecap. Pain to the site. She was able to walk right away but painful. History of fracture, she is uncertain if to this or previous kneecap.         Review of Systems   Review of Systems   Constitutional:  Negative for chills and fever.   HENT:  Negative for congestion and sore throat.    Respiratory:  Negative for cough and shortness of breath.    Cardiovascular:  Negative for chest pain.   Gastrointestinal:  Negative for abdominal pain.   Musculoskeletal:  Positive for gait problem and myalgias.   Skin:  Negative for wound.   Neurological:  Negative for weakness and numbness.   Psychiatric/Behavioral:  Negative for confusion.          Current Medications       Current  Outpatient Medications:     albuterol (ProAir HFA) 90 mcg/act inhaler, Inhale 2 puffs every 4 (four) hours as needed for wheezing or shortness of breath, Disp: 8.5 g, Rfl: 7    amLODIPine (NORVASC) 5 mg tablet, Take 1 tablet (5 mg total) by mouth daily, Disp: 90 tablet, Rfl: 3    ASPIRIN 81 PO, Take 81 mg by mouth every morning, Disp: , Rfl:     atorvastatin (LIPITOR) 80 mg tablet, Take 1 tablet (80 mg total) by mouth daily, Disp: 90 tablet, Rfl: 3    Calcium Carbonate (CALTRATE 600 PO), Take by mouth in the morning, Disp: , Rfl:     carvedilol (COREG) 12.5 mg tablet, Take 1 tablet (12.5 mg total) by mouth 2 (two) times a day with meals, Disp: 180 tablet, Rfl: 3    cetirizine (ZyrTEC) 10 mg tablet, Take 10 mg by mouth daily, Disp: , Rfl:     esomeprazole (NexIUM) 40 MG capsule, Take 40 mg by mouth every morning, Disp: , Rfl:     estradiol (ESTRACE VAGINAL) 0.1 mg/g vaginal cream, Apply pea size amount around the opening of the vagina daily x 14 days, followed by twice weekly., Disp: 42.5 g, Rfl: 1    furosemide (LASIX) 20 mg tablet, Take 1 tablet (20 mg total) by mouth daily (Patient not taking: Reported on 9/25/2023), Disp: 30 tablet, Rfl: 1    meclizine (ANTIVERT) 12.5 MG tablet, Take 1 tablet (12.5 mg total) by mouth 3 (three) times a day as needed for dizziness, Disp: 30 tablet, Rfl: 0    metFORMIN (GLUCOPHAGE-XR) 500 mg 24 hr tablet, Take 2 tablets (1,000 mg total) by mouth 2 (two) times a day with meals, Disp: 360 tablet, Rfl: 1    nitroglycerin (NITROSTAT) 0.4 mg SL tablet, Place 0.4 mg under the tongue every 5 (five) minutes as needed, Disp: , Rfl:     ramipril (ALTACE) 10 MG capsule, Take 1 capsule (10 mg total) by mouth daily at bedtime, Disp: 90 capsule, Rfl: 3    Triprolidine-Pseudoephedrine (ANTIHISTAMINE PO), Take by mouth daily at bedtime, Disp: , Rfl:     Current Allergies     Allergies as of 03/15/2024 - Reviewed 03/15/2024   Allergen Reaction Noted    Prasugrel Rash 02/09/2018            The  "following portions of the patient's history were reviewed and updated as appropriate: allergies, current medications, past family history, past medical history, past social history, past surgical history and problem list.     Past Medical History:   Diagnosis Date    Coronary artery disease     pt has 6 stents total-2012 x1, 5/29/12-x3, 1/9/13 x2    Diabetes mellitus (HCC)     GERD (gastroesophageal reflux disease)     High cholesterol     Hypertension     Myocardial infarction (HCC) 03/2012    x1 stent    Shortness of breath     with exertion       Past Surgical History:   Procedure Laterality Date    CARPAL TUNNEL RELEASE Right     CORONARY ANGIOPLASTY WITH STENT PLACEMENT      x6 stents total    FOOT SURGERY      bunionectomy    GANGLION CYST EXCISION Right        Family History   Problem Relation Age of Onset    Diabetes Mother     Arthritis Mother     Endometriosis Sister          Medications have been verified.        Objective   /64   Pulse 72   Temp 97.9 °F (36.6 °C)   Resp 18   Ht 5' 4.5\" (1.638 m)   Wt 94.8 kg (209 lb)   SpO2 95%   BMI 35.32 kg/m²        Physical Exam     Physical Exam  Vitals reviewed.   Constitutional:       Appearance: Normal appearance.   Cardiovascular:      Rate and Rhythm: Normal rate and regular rhythm.      Pulses: Normal pulses.      Heart sounds: Normal heart sounds. No murmur heard.  Pulmonary:      Effort: Pulmonary effort is normal. No respiratory distress.      Breath sounds: Normal breath sounds.   Musculoskeletal:      Right knee: Bony tenderness present. Tenderness present.      Comments: Tenderness right over the patella. Point tenderness. Otherwise testing negative to the knee. No swelling, bruising noted.    Skin:     General: Skin is warm and dry.      Capillary Refill: Capillary refill takes less than 2 seconds.      Findings: No rash.   Neurological:      General: No focal deficit present.      Mental Status: She is alert and oriented to person, place, " and time.   Psychiatric:         Mood and Affect: Mood normal.         Behavior: Behavior normal.         Orthopedic injury treatment    Date/Time: 3/15/2024 3:30 PM    Performed by: DAVID Main  Authorized by: DAVID Main  Universal Protocol:  Consent: Verbal consent obtained.  Consent given by: patient  Timeout called at: 3/15/2024 2:10 PM.  Patient understanding: patient states understanding of the procedure being performed  Patient identity confirmed: verbally with patient    Injury location:  Knee  Location details:  Right knee  Injury type:  Fracture  Fracture type: patellar    Neurovascular status: Neurovascularly intact    Distal perfusion: normal    Neurological function: normal    Range of motion: normal    Immobilization:  Knee immobilizer  Neurovascular status: Neurovascularly intact    Distal perfusion: normal    Neurological function: normal    Range of motion: unchanged    Patient tolerance:  Patient tolerated the procedure well with no immediate complications

## 2024-03-19 ENCOUNTER — OFFICE VISIT (OUTPATIENT)
Dept: OBGYN CLINIC | Facility: CLINIC | Age: 79
End: 2024-03-19
Payer: MEDICARE

## 2024-03-19 VITALS
DIASTOLIC BLOOD PRESSURE: 90 MMHG | HEART RATE: 75 BPM | BODY MASS INDEX: 34.82 KG/M2 | WEIGHT: 209 LBS | SYSTOLIC BLOOD PRESSURE: 121 MMHG | HEIGHT: 65 IN

## 2024-03-19 DIAGNOSIS — S80.01XA CONTUSION OF RIGHT KNEE, INITIAL ENCOUNTER: ICD-10-CM

## 2024-03-19 PROCEDURE — 99203 OFFICE O/P NEW LOW 30 MIN: CPT | Performed by: ORTHOPAEDIC SURGERY

## 2024-03-19 NOTE — PROGRESS NOTES
Assessment/Plan:  1. Contusion of right knee, initial encounter  Ambulatory Referral to Orthopedic Surgery    Ambulatory Referral to Physical Therapy          The patent does have an abnormality about the superior patella. This could be a chronic abnormality given her history of prior patella fracture. She is ambulating well and has excellent strength today. He pain is more diffuse about the anterior knee and thus I do suspect a patellar contusion as well as some post traumatic patellar tendonitis. She can discontinue her knee immobilizer. She may use a cane if she would like. I prescribed her physical therapy for the knee today. She will FU in 8 weeks if she fails to make progress.                 Subjective:   Meka See is a 78 y.o. female who presents today for evaluation of her right knee. She sustained a fall on 3/4/24 when she was walking in her garage. She is not certain exactly how she landed when she fell. She she was till having a pain a few days ago, she went for xrays on 3/15/24 which showed questionable fracture superior patella osteophyte. I am able to view these images today. She had been ambulating fine prior to these xrays, however since these xrays she has been in a knee immobilizer, however she note she can ambulated well without this.She still notes good strength about the knee and fair ROM.  The patient does have a history of prior patella fracture back in 2017.    Review of Systems   Constitutional: Negative.  Negative for chills and fever.   HENT: Negative.  Negative for ear pain and sore throat.    Eyes: Negative.  Negative for pain and redness.   Respiratory: Negative.  Negative for shortness of breath and wheezing.    Cardiovascular:  Negative for chest pain and palpitations.   Gastrointestinal: Negative.  Negative for abdominal pain and blood in stool.   Endocrine: Negative.  Negative for polydipsia and polyuria.   Genitourinary: Negative.  Negative for difficulty urinating and  dysuria.   Musculoskeletal:         As noted in HPI   Skin: Negative.  Negative for pallor and rash.   Neurological: Negative.  Negative for dizziness and numbness.   Hematological: Negative.  Negative for adenopathy. Does not bruise/bleed easily.   Psychiatric/Behavioral: Negative.  Negative for confusion and suicidal ideas.          Past Medical History:   Diagnosis Date   • Coronary artery disease     pt has 6 stents total-2012 x1, 12-x3, 1/9/13 x2   • Diabetes mellitus (HCC)    • GERD (gastroesophageal reflux disease)    • High cholesterol    • Hypertension    • Myocardial infarction (HCC) 03/2012    x1 stent   • Shortness of breath     with exertion       Past Surgical History:   Procedure Laterality Date   • CARPAL TUNNEL RELEASE Right    • CORONARY ANGIOPLASTY WITH STENT PLACEMENT      x6 stents total   • FOOT SURGERY      bunionectomy   • GANGLION CYST EXCISION Right        Family History   Problem Relation Age of Onset   • Diabetes Mother    • Arthritis Mother    • Endometriosis Sister        Social History     Occupational History   • Not on file   Tobacco Use   • Smoking status: Former     Current packs/day: 0.00     Average packs/day: 0.5 packs/day for 45.0 years (22.5 ttl pk-yrs)     Types: Cigarettes     Start date: 6/15/1967     Quit date: 6/15/2012     Years since quittin.7     Passive exposure: Never   • Smokeless tobacco: Never   Vaping Use   • Vaping status: Never Used   Substance and Sexual Activity   • Alcohol use: Yes     Comment: rare   • Drug use: Never   • Sexual activity: Yes     Partners: Male     Birth control/protection: Post-menopausal         Current Outpatient Medications:   •  albuterol (ProAir HFA) 90 mcg/act inhaler, Inhale 2 puffs every 4 (four) hours as needed for wheezing or shortness of breath, Disp: 8.5 g, Rfl: 7  •  amLODIPine (NORVASC) 5 mg tablet, Take 1 tablet (5 mg total) by mouth daily, Disp: 90 tablet, Rfl: 3  •  ASPIRIN 81 PO, Take 81 mg by mouth every  morning, Disp: , Rfl:   •  atorvastatin (LIPITOR) 80 mg tablet, Take 1 tablet (80 mg total) by mouth daily, Disp: 90 tablet, Rfl: 3  •  Calcium Carbonate (CALTRATE 600 PO), Take by mouth in the morning, Disp: , Rfl:   •  carvedilol (COREG) 12.5 mg tablet, Take 1 tablet (12.5 mg total) by mouth 2 (two) times a day with meals, Disp: 180 tablet, Rfl: 3  •  esomeprazole (NexIUM) 40 MG capsule, Take 40 mg by mouth every morning, Disp: , Rfl:   •  estradiol (ESTRACE VAGINAL) 0.1 mg/g vaginal cream, Apply pea size amount around the opening of the vagina daily x 14 days, followed by twice weekly., Disp: 42.5 g, Rfl: 1  •  meclizine (ANTIVERT) 12.5 MG tablet, Take 1 tablet (12.5 mg total) by mouth 3 (three) times a day as needed for dizziness, Disp: 30 tablet, Rfl: 0  •  metFORMIN (GLUCOPHAGE-XR) 500 mg 24 hr tablet, Take 2 tablets (1,000 mg total) by mouth 2 (two) times a day with meals, Disp: 360 tablet, Rfl: 1  •  ramipril (ALTACE) 10 MG capsule, Take 1 capsule (10 mg total) by mouth daily at bedtime, Disp: 90 capsule, Rfl: 3  •  Triprolidine-Pseudoephedrine (ANTIHISTAMINE PO), Take by mouth daily at bedtime, Disp: , Rfl:   •  cetirizine (ZyrTEC) 10 mg tablet, Take 10 mg by mouth daily, Disp: , Rfl:   •  furosemide (LASIX) 20 mg tablet, Take 1 tablet (20 mg total) by mouth daily (Patient not taking: Reported on 9/25/2023), Disp: 30 tablet, Rfl: 1  •  nitroglycerin (NITROSTAT) 0.4 mg SL tablet, Place 0.4 mg under the tongue every 5 (five) minutes as needed, Disp: , Rfl:     Allergies   Allergen Reactions   • Prasugrel Rash     effient       Objective:  Vitals:    03/19/24 1005   BP: 121/90   Pulse: 75     Pain Score:   5      Right Knee Exam     Tenderness   Right knee tenderness location: DIffuse anterior knee tenderness -anterior patella and patellar tendon.    Range of Motion   Extension:  0   Flexion:  120     Tests   Varus: negative Valgus: negative  Drawer:  Anterior - negative    Posterior - negative    Other    Erythema: absent  Sensation: normal  Pulse: present  Swelling: none  Effusion: no effusion present    Comments:  Patient able to do straight leg raise without extensor lag. 5/5 strength knee extension.           Observations     Right Knee   Negative for effusion.       Physical Exam  Constitutional:       General: She is not in acute distress.     Appearance: She is well-developed.   HENT:      Head: Normocephalic and atraumatic.   Eyes:      General: No scleral icterus.     Conjunctiva/sclera: Conjunctivae normal.   Neck:      Vascular: No JVD.   Cardiovascular:      Rate and Rhythm: Normal rate.   Pulmonary:      Effort: Pulmonary effort is normal. No respiratory distress.   Musculoskeletal:      Right knee: No effusion.      Comments: As per HPI   Skin:     General: Skin is warm.   Neurological:      Mental Status: She is alert and oriented to person, place, and time.      Coordination: Coordination normal.         I have personally reviewed pertinent films in PACS and my interpretation is as follows:  Xrays right knee from 3/15/24: Possible chronic vs acute fracture superior patellar osteophyte.      This document was created using speech voice recognition software.   Grammatical errors, random word insertions, pronoun errors, and incomplete sentences are an occasional consequence of this system due to software limitations, ambient noise, and hardware issues.   Any formal questions or concerns about content, text, or information contained within the body of this dictation should be directly addressed to the provider for clarification.

## 2024-03-26 ENCOUNTER — OFFICE VISIT (OUTPATIENT)
Dept: CARDIOLOGY CLINIC | Facility: CLINIC | Age: 79
End: 2024-03-26
Payer: MEDICARE

## 2024-03-26 VITALS
BODY MASS INDEX: 34.49 KG/M2 | OXYGEN SATURATION: 97 % | DIASTOLIC BLOOD PRESSURE: 70 MMHG | HEART RATE: 76 BPM | WEIGHT: 207 LBS | SYSTOLIC BLOOD PRESSURE: 130 MMHG | HEIGHT: 65 IN

## 2024-03-26 DIAGNOSIS — I20.89 CHRONIC STABLE ANGINA: ICD-10-CM

## 2024-03-26 DIAGNOSIS — I20.89 ANGINA OF EFFORT: ICD-10-CM

## 2024-03-26 DIAGNOSIS — I50.32 DIASTOLIC DYSFUNCTION WITH CHRONIC HEART FAILURE (HCC): ICD-10-CM

## 2024-03-26 DIAGNOSIS — E66.01 OBESITY, MORBID (HCC): ICD-10-CM

## 2024-03-26 DIAGNOSIS — E78.5 DYSLIPIDEMIA: ICD-10-CM

## 2024-03-26 DIAGNOSIS — I10 PRIMARY HYPERTENSION: ICD-10-CM

## 2024-03-26 DIAGNOSIS — I21.A9 OTHER TYPE OF MYOCARDIAL INFARCTION (HCC): ICD-10-CM

## 2024-03-26 DIAGNOSIS — I25.118 CORONARY ARTERY DISEASE OF NATIVE ARTERY OF NATIVE HEART WITH STABLE ANGINA PECTORIS (HCC): Primary | ICD-10-CM

## 2024-03-26 PROCEDURE — 99214 OFFICE O/P EST MOD 30 MIN: CPT | Performed by: INTERNAL MEDICINE

## 2024-03-27 ENCOUNTER — EVALUATION (OUTPATIENT)
Dept: PHYSICAL THERAPY | Facility: CLINIC | Age: 79
End: 2024-03-27
Payer: MEDICARE

## 2024-03-27 DIAGNOSIS — S80.01XA CONTUSION OF RIGHT KNEE, INITIAL ENCOUNTER: Primary | ICD-10-CM

## 2024-03-27 DIAGNOSIS — M25.561 ACUTE PAIN OF RIGHT KNEE: ICD-10-CM

## 2024-03-27 PROCEDURE — 97530 THERAPEUTIC ACTIVITIES: CPT

## 2024-03-27 PROCEDURE — 97161 PT EVAL LOW COMPLEX 20 MIN: CPT

## 2024-03-27 PROCEDURE — 93000 ELECTROCARDIOGRAM COMPLETE: CPT | Performed by: INTERNAL MEDICINE

## 2024-03-27 NOTE — PROGRESS NOTES
PT Evaluation   Today's date: 3/27/2024  Patient name: Meka See  : 1945  MRN: [id number]  Referring provider: José Antonio Murillo PA-C  Dx:   Encounter Diagnosis     ICD-10-CM    1. Contusion of right knee, initial encounter  S80.01XA Ambulatory Referral to Physical Therapy      2. Acute pain of right knee  M25.561             Assessment  Assessment details: Patient is a 78 y.o. Female who presents with referring diagnosis of contusion of right knee, initial encounter. Patient's greatest concern is worry over not knowing what's wrong and fear of not being able to keep active.    Primary movement impairment diagnosis of knee pain with mobility deficits resulting in pathoanatomical symptoms of restricted range of motion, muscular power deficits, motor control deficits, gait dysfunction, and functional limitations. The aforementioned impairments have limited the patient's ability to negotiate stairs reciprocally, perform sit to stand transfers without use of hands, walk usual distances. No further referral appears necessary at this time based upon examination results.    Patient education performed during today's session included: HEP consisting of quad sets, SLR, heel slides and ankle pumps    Primary movement impairments:  1) Mobility deficits  2) Muscular power deficits    Etiological factors include: fall sustained early 3/2024        Impairments: Abnormal coordination, Abnormal gait, Abnormal muscle tone, Abnormal or restricted ROM, Activity intolerance, Impaired balance, Impaired physical strength, Lacks appropriate HEP, Poor body mechanics, Pain with function, Weight-bearing intolerance, Abnormal movement, and Abnormal muscle firing  Understanding of Dx/Px/POC: Good  Prognosis: Good   Positive prognostic factors: motivation for improvement   Negative prognostic factors: comorbid conditions, sedentary lifestyle    Patient verbalized  understanding of POC.         Please contact me if you have any questions or recommendations. Thank you for the referral and the opportunity to share in Meka See's care.        Plan  Patient would benefit from: PT Eval and Skilled PT  Planned modality interventions: Biofeedback, Cryotherapy, TENS, Thermotherapy: Hydrocollator Packs, and Traction  Planned therapy interventions: Abdominal trunk stabilization, ADL training, Balance, Body mechanics training, Coordination, Dry Needling, Functional ROM exercises, Gait training, HEP, Joint mobilization, Manual therapy, Parr taping, Motor coordination training, Neuromuscular re-education, Patient education, Strengthening, Stretching, Therapeutic activities, Therapeutic exercises, and Activity modification  Frequency: 2x/wk  Duration in weeks: 8  Plan of Care beginning date: 3/27/2024  Plan of Care expiration date: 8 weeks - 5/22/2024  Treatment plan discussed with: Patient       Goals  Short Term Goals (4 weeks):    - Patient will be independent in basic HEP 2-3 weeks  - Patient will report >50% reduction in pain  - Patient will demonstrate >1/3 improvement in MMT grade as applicable  - Patient will demo equal, pain free, knee ROM bilaterally    Long Term Goals (8 weeks):  - Patient will be independent in a comprehensive home exercise program  - Patient FOTO score will improve to >65/100  - Patient will self-report >75% improvement in function  - Patient will perform STS transfers with min use of hands  - Patient will negotiate stairs with reciprocal pattern      Subjective    History of Present Illness  - Mechanism of injury: Patient reports to physical therapy after sustaining right knee injury in fall in early March. States she tripped on step going into her garage, which caused the fall. States things are improving overall, but bending is uncomfortable. Had x-ray, negative for new fracture. Patient states she did have fall 8 years ago in which she fractured  her patella, states this healed well. States she has to use increased use of hands for rising from a chair. Reports increased difficulty with stairs, utilizes step-to pattern. States she has been sitting more since her injury.    States she did experience some numbness about her knee early on after fall, which has since subsided.    Denies recent changes in B&B habits.    Patient notes a history of swelling into B LE, PCP is aware. Patient states she has noticed increased swelling into R LE since fall.      Pain  - Current pain ratin/10  - At best pain ratin/10  - At worst pain ratin/10  - Location: lateral aspect of knee    Social Support  - Steps to enter house: 0  - Stairs in house: full flight to basement       Objective     Sensation  - Light touch: diminished sensation noted L4 dermatome on right, all others grossly equal    LE MMT  LEFT  RIGHT  -Hip Flexion:   4/5  3+/5  -Hip Abduction: 3+/5  3+/5  -Hip Adduction: 4/5  4/5  -Hip IR:  4+/5  4/5  -Hip ER:  4+/5  4/5    -Knee Flexion  4+/5  4+/5  -Knee Extension 4+/5  4/5    -Ankle DF  5/5  5/5  -Ankle PF  5/5  5/5    Knee Range of Motion    LEFT  RIGHT  - Flexion 130  125  - Extension 0  0     Palpation  - Positive: lateral joint line, patellar tendon, quad tendon, superior tip of patella    Mobility Assessment  Mild restriction with patellar mobility on right  Fib head WNL  Tibial IR/ER WNL    Functional Assessment  -Functional Squat: achieves 50% of motion with anterior knee pain  -Gait Assessment: antalgic gait with decreased knee flexion on right  -Circumferential Measurement (10 cm distal to tib tub)  Right - 41.6 cm  Left - 40.6 cm  -Patient with 1+ pitting edema bilaterally             Insurance:  [employer]/CMS Eval/ Re-eval POC expires FOTO Auth #/ Referral # Total units  Start date  Expiration date Extension  Visit limitation?  PT only or  PT+OT? Co-Insurance   MCR: CMS 3/27/24 5/22/24                                                                         Date 3/27/24        Visit Number IE        Manual         R knee LAD w/ oscillation         Patellar mobs                           Neuro Re-Ed         Bridges         Clamshells         Sit to stand         Lateral stepping         Step ups                                                      TherEx         Heel slides Rev for HEP        Ankle pumps Rev for HEP        Quad set, SLR Rev for HEP        Knee ext: SAQ, LAQ                                    TherAct         Patient education Pt edu, PT POC, HEP 10 min                                            Gait Training                                    Modalities         CP               Precautions:   Past Medical History:   Diagnosis Date    Coronary artery disease     pt has 6 stents total-2012 x1, 5/29/12-x3, 1/9/13 x2    Diabetes mellitus (HCC)     GERD (gastroesophageal reflux disease)     High cholesterol     Hypertension     Myocardial infarction (HCC) 03/2012    x1 stent    Shortness of breath     with exertion

## 2024-04-01 ENCOUNTER — OFFICE VISIT (OUTPATIENT)
Dept: PHYSICAL THERAPY | Facility: CLINIC | Age: 79
End: 2024-04-01
Payer: MEDICARE

## 2024-04-01 DIAGNOSIS — M25.561 ACUTE PAIN OF RIGHT KNEE: ICD-10-CM

## 2024-04-01 DIAGNOSIS — S80.01XA CONTUSION OF RIGHT KNEE, INITIAL ENCOUNTER: Primary | ICD-10-CM

## 2024-04-01 PROCEDURE — 97110 THERAPEUTIC EXERCISES: CPT

## 2024-04-01 PROCEDURE — 97112 NEUROMUSCULAR REEDUCATION: CPT

## 2024-04-01 NOTE — PROGRESS NOTES
"Daily Note     Today's date: 2024  Patient name: Meka See  : 1945  MRN: [id number]  Referring provider: José Antonio Murillo PA-C  Dx:   Encounter Diagnosis     ICD-10-CM    1. Contusion of right knee, initial encounter  S80.01XA       2. Acute pain of right knee  M25.561           Start Time: 1015  Stop Time: 1053  Total time in clinic (min): 38 minutes    Subjective: Patient reports continued improvement in right knee pain since previous session. States main issues is trying to extend her knee after sitting for long period of time.      Objective: See treatment diary below      Assessment: Tolerated treatment well. Patient provided w/ graded vc w/ squats to improve technique, equal WB noted bilat. Initiated additional glute progressions during today's session; patient provided with graded verbal and tactile cues with clamshells to limit compensatory lumbar extension and improve glute isolation. Patient will continue to benefit from skilled PT to improve functional mobility and activity tolerance.      Plan: Continue per plan of care.      Insurance:  [place]/CMS Eval/ Re-eval POC expires FOTO Auth #/ Referral # Total units  Start date  Expiration date Extension  Visit limitation?  PT only or  PT+OT? Co-Insurance   MCR: CMS 3/27/24 5/22/24                                                                        Date 3/27/24 4/1/24       Visit Number IE 2       Manual         R knee LAD w/ oscillation  5 min       Patellar mobs  All directions, 5x30                         Neuro Re-Ed         Bridges  3x10        Clamshells  3x10 each       Sit to stand  Squats: 2x10       Lateral stepping         Step ups                                                      TherEx         Heel slides Rev for HEP Peanuts, 5\" x30       Ankle pumps Rev for HEP        Quad set, SLR Rev for HEP AROM, 3x5       Knee ext: SAQ, LAQ  LAQ: 3x10                                  TherAct         Patient education Pt edu, PT POC, HEP " 10 min                                            Gait Training                                    Modalities         CP               Precautions:   Past Medical History:   Diagnosis Date    Coronary artery disease     pt has 6 stents total-2012 x1, 5/29/12-x3, 1/9/13 x2    Diabetes mellitus (HCC)     GERD (gastroesophageal reflux disease)     High cholesterol     Hypertension     Myocardial infarction (HCC) 03/2012    x1 stent    Shortness of breath     with exertion

## 2024-04-02 ENCOUNTER — HOSPITAL ENCOUNTER (OUTPATIENT)
Dept: RADIOLOGY | Facility: HOSPITAL | Age: 79
Discharge: HOME | End: 2024-04-02
Payer: MEDICARE

## 2024-04-02 VITALS — BODY MASS INDEX: 34.49 KG/M2 | WEIGHT: 207 LBS | HEIGHT: 65 IN

## 2024-04-02 DIAGNOSIS — Z78.0 POST-MENOPAUSAL: ICD-10-CM

## 2024-04-02 DIAGNOSIS — Z12.31 ENCOUNTER FOR SCREENING MAMMOGRAM FOR MALIGNANT NEOPLASM OF BREAST: ICD-10-CM

## 2024-04-02 PROCEDURE — 77063 BREAST TOMOSYNTHESIS BI: CPT

## 2024-04-02 PROCEDURE — 77080 DXA BONE DENSITY AXIAL: CPT

## 2024-04-02 PROCEDURE — 77067 SCR MAMMO BI INCL CAD: CPT

## 2024-04-03 ENCOUNTER — OFFICE VISIT (OUTPATIENT)
Dept: PHYSICAL THERAPY | Facility: CLINIC | Age: 79
End: 2024-04-03
Payer: MEDICARE

## 2024-04-03 DIAGNOSIS — S80.01XA CONTUSION OF RIGHT KNEE, INITIAL ENCOUNTER: Primary | ICD-10-CM

## 2024-04-03 DIAGNOSIS — E55.9 VITAMIN D INSUFFICIENCY: Primary | ICD-10-CM

## 2024-04-03 DIAGNOSIS — M25.561 ACUTE PAIN OF RIGHT KNEE: ICD-10-CM

## 2024-04-03 PROCEDURE — 97110 THERAPEUTIC EXERCISES: CPT

## 2024-04-03 PROCEDURE — 97112 NEUROMUSCULAR REEDUCATION: CPT

## 2024-04-03 NOTE — PROGRESS NOTES
"Daily Note     Today's date: 4/3/2024  Patient name: Meka See  : 1945  MRN: [id number]  Referring provider: José Antonio Murillo PA-C  Dx:   Encounter Diagnosis     ICD-10-CM    1. Contusion of right knee, initial encounter  S80.01XA       2. Acute pain of right knee  M25.561           Start Time: 1143  Stop Time: 1221  Total time in clinic (min): 38 minutes    Subjective: \"Considering the weather, my knee isn't feeling that bad.\" Reports improving ability to bend and extend knee.      Objective: See treatment diary below      Assessment: Tolerated treatment well. Continued to promote proximal strengthening progressions during today's session. Cont to demo quick fatigability about hip flexor and quad mm. Patient provided w/ graded vc with initiation of step ups to promote anterior tibial translation and quad loading. Pt educated on DOMS for 24-48 hours post session, verbalized good understanding/agreement. Patient will continue to benefit from skilled PT to improve functional mobility and activity tolerance.      Plan: Continue per plan of care.      Insurance:  [place]/CMS Eval/ Re-eval POC expires FOTO Auth #/ Referral # Total units  Start date  Expiration date Extension  Visit limitation?  PT only or  PT+OT? Co-Insurance   MCR: CMS 3/27/24 5/22/24                                                                        Date 3/27/24 4/1/24 4/3/24      Visit Number IE 2 3      Manual         R knee LAD w/ oscillation  5 min       Patellar mobs  All directions, 5x30                         Neuro Re-Ed         Bridges  3x10  3x10      Clamshells  3x10 each 3x10 each      Sit to stand  Squats: 2x10 Squats with UE support, 2x10      Lateral stepping         Step ups   4\" 3x10 on right      Reverse clamshell   3x10 each                                          TherEx         Heel slides Rev for HEP Peanuts, 5\" x30 Peanuts, 5\" x30      Ankle pumps Rev for HEP  HR: 3x10      Quad set, SLR Rev for HEP AROM, 3x5 AROM, " 3x8      Knee ext: SAQ, LAQ  LAQ: 3x10 LAQ: 3x10                                 TherAct         Patient education Pt edu, PT POC, HEP 10 min                                            Gait Training                                    Modalities         CP               Precautions:   Past Medical History:   Diagnosis Date    Coronary artery disease     pt has 6 stents total-2012 x1, 5/29/12-x3, 1/9/13 x2    Diabetes mellitus (HCC)     GERD (gastroesophageal reflux disease)     High cholesterol     Hypertension     Myocardial infarction (HCC) 03/2012    x1 stent    Shortness of breath     with exertion

## 2024-04-05 DIAGNOSIS — E11.22 TYPE 2 DIABETES MELLITUS WITH STAGE 2 CHRONIC KIDNEY DISEASE, WITHOUT LONG-TERM CURRENT USE OF INSULIN  (HCC): ICD-10-CM

## 2024-04-05 DIAGNOSIS — N18.2 TYPE 2 DIABETES MELLITUS WITH STAGE 2 CHRONIC KIDNEY DISEASE, WITHOUT LONG-TERM CURRENT USE OF INSULIN  (HCC): ICD-10-CM

## 2024-04-05 RX ORDER — METFORMIN HYDROCHLORIDE 500 MG/1
1000 TABLET, EXTENDED RELEASE ORAL 2 TIMES DAILY WITH MEALS
Qty: 360 TABLET | Refills: 1 | Status: SHIPPED | OUTPATIENT
Start: 2024-04-05 | End: 2024-10-02

## 2024-04-08 ENCOUNTER — OFFICE VISIT (OUTPATIENT)
Dept: PHYSICAL THERAPY | Facility: CLINIC | Age: 79
End: 2024-04-08
Payer: MEDICARE

## 2024-04-08 DIAGNOSIS — S80.01XA CONTUSION OF RIGHT KNEE, INITIAL ENCOUNTER: Primary | ICD-10-CM

## 2024-04-08 DIAGNOSIS — M25.561 ACUTE PAIN OF RIGHT KNEE: ICD-10-CM

## 2024-04-08 PROCEDURE — 97112 NEUROMUSCULAR REEDUCATION: CPT

## 2024-04-08 PROCEDURE — 97110 THERAPEUTIC EXERCISES: CPT

## 2024-04-08 NOTE — PROGRESS NOTES
"Daily Note     Today's date: 2024  Patient name: Meka See  : 1945  MRN: [id number]  Referring provider: José Antonio Murillo PA-C  Dx:   Encounter Diagnosis     ICD-10-CM    1. Contusion of right knee, initial encounter  S80.01XA       2. Acute pain of right knee  M25.561           Start Time: 1012  Stop Time: 1052  Total time in clinic (min): 40 minutes    Subjective: \"Sometimes it still let me know it's there, but it's better overall.\"      Objective: See treatment diary below      Assessment: Tolerated treatment well. Initiated additional standing progressions during today's session to promote tolerance to functional activities and proximal stability/strength. Pt w/ quick fatigue during standing progressions, req intermittent rest breaks. Pt provided w/ intermittent vc during exaggerated walking to promote hip flexion. Reviewed DOMS for 24-48 hours post session, verbalized good understanding/agreement. Patient will continue to benefit from skilled PT to improve functional mobility and activity tolerance.      Plan: Continue per plan of care.      Insurance:  A/CMS Eval/ Re-eval POC expires FOTO Auth #/ Referral # Total units  Start date  Expiration date Extension  Visit limitation?  PT only or  PT+OT? Co-Insurance   MCR: CMS 3/27/24 5/22/24                                                                        Date 3/27/24 4/1/24 4/3/24 4/8/24     Visit Number IE 2 3 4     Manual         R knee LAD w/ oscillation  5 min       Patellar mobs  All directions, 5x30                         Neuro Re-Ed         Bridges  3x10  3x10 3x10     Clamshells  3x10 each 3x10 each 3x10 each     Sit to stand  Squats: 2x10 Squats with UE support, 2x10 Squats with UE support, 3x10     Lateral stepping    15 feet, 6 laps     Step ups   4\" 3x10 on right 4\" 2x10 on right     Reverse clamshell   3x10 each      Exaggerated walking    15 feet, 6 laps     Backward walking                           TherEx         Heel " "slides Rev for HEP Peanuts, 5\" x30 Peanuts, 5\" x30 Knee flexion stretch at step: 10\" x8     Ankle pumps Rev for HEP  HR: 3x10 3x10     Quad set, SLR Rev for HEP AROM, 3x5 AROM, 3x8 AROM, 3x8     Knee ext: SAQ, LAQ  LAQ: 3x10 LAQ: 3x10 LAQ: 3x10                                TherAct         Patient education Pt edu, PT POC, HEP 10 min                                            Gait Training                                    Modalities         CP               Precautions:   Past Medical History:   Diagnosis Date    Coronary artery disease     pt has 6 stents total-2012 x1, 5/29/12-x3, 1/9/13 x2    Diabetes mellitus (HCC)     GERD (gastroesophageal reflux disease)     High cholesterol     Hypertension     Myocardial infarction (HCC) 03/2012    x1 stent    Shortness of breath     with exertion                   "

## 2024-04-10 ENCOUNTER — OFFICE VISIT (OUTPATIENT)
Dept: PHYSICAL THERAPY | Facility: CLINIC | Age: 79
End: 2024-04-10
Payer: MEDICARE

## 2024-04-10 DIAGNOSIS — S80.01XA CONTUSION OF RIGHT KNEE, INITIAL ENCOUNTER: Primary | ICD-10-CM

## 2024-04-10 DIAGNOSIS — M25.561 ACUTE PAIN OF RIGHT KNEE: ICD-10-CM

## 2024-04-10 PROCEDURE — 97112 NEUROMUSCULAR REEDUCATION: CPT

## 2024-04-10 PROCEDURE — 97110 THERAPEUTIC EXERCISES: CPT

## 2024-04-10 NOTE — PROGRESS NOTES
Daily Note     Today's date: 4/10/2024  Patient name: Meka See  : 1945  MRN: [id number]  Referring provider: José Antonio Murillo PA-C  Dx:   Encounter Diagnosis     ICD-10-CM    1. Contusion of right knee, initial encounter  S80.01XA       2. Acute pain of right knee  M25.561           Start Time: 1015  Stop Time: 1055  Total time in clinic (min): 40 minutes    Subjective: Patient reports experiencing soreness about knee following previous session that resolved within 24 hours.       Objective: See treatment diary below      Assessment: Tolerated treatment well. Patient with restriction in patellar mobility during today's session, particularly with inferior glide; cont to joint mobs to address, which were tolerated well. Patient demo irritability of symptoms about knee with CKC knee flexion activities during today's session, held step ups as a result. Patient nilson initiation of leg press MRE well to promote quad strength and motor control. Reviewed DOMS for 24-48 hours post session, pt verbalized good understanding/agreement. Patient will continue to benefit from skilled PT to improve functional mobility and activity tolerance.      Plan: Continue per plan of care.      Insurance:  A/CMS Eval/ Re-eval POC expires FOTO Auth #/ Referral # Total units  Start date  Expiration date Extension  Visit limitation?  PT only or  PT+OT? Co-Insurance   MCR: CMS 3/27/24 5/22/24                                                                        Date 3/27/24 4/1/24 4/3/24 4/8/24 4/10/24    Visit Number IE 2 3 4 5    Manual         R knee LAD w/ oscillation  5 min       Patellar mobs  All directions, 5x30   All directions, 8x30         Medial tibial MWM x15             Neuro Re-Ed         Bridges  3x10  3x10 3x10 3x10    Clamshells  3x10 each 3x10 each 3x10 each     Sit to stand  Squats: 2x10 Squats with UE support, 2x10 Squats with UE support, 3x10 Squats with UE support, 3x10    Lateral stepping    15 feet, 6  "laps     Step ups   4\" 3x10 on right 4\" 2x10 on right     Reverse clamshell   3x10 each      Exaggerated walking    15 feet, 6 laps     Backward walking     10 feet, 4 laps                      TherEx         Heel slides Rev for HEP Peanuts, 5\" x30 Peanuts, 5\" x30 Knee flexion stretch at step: 10\" x8     Ankle pumps Rev for HEP  HR: 3x10 3x10 HR: 3x10    Quad set, SLR Rev for HEP AROM, 3x5 AROM, 3x8 AROM, 3x8 AROM, 3x8    Knee ext: SAQ, LAQ  LAQ: 3x10 LAQ: 3x10 LAQ: 3x10 LAQ: 3x10    NuStep     5 min, level 1, seat 10    Leg press     MRE, 3x10             TherAct         Patient education Pt edu, PT POC, HEP 10 min                                            Gait Training                                    Modalities         CP               Precautions:   Past Medical History:   Diagnosis Date    Coronary artery disease     pt has 6 stents total-2012 x1, 5/29/12-x3, 1/9/13 x2    Diabetes mellitus (HCC)     GERD (gastroesophageal reflux disease)     High cholesterol     Hypertension     Myocardial infarction (HCC) 03/2012    x1 stent    Shortness of breath     with exertion                     "

## 2024-04-16 ENCOUNTER — OFFICE VISIT (OUTPATIENT)
Dept: PHYSICAL THERAPY | Facility: CLINIC | Age: 79
End: 2024-04-16
Payer: MEDICARE

## 2024-04-16 DIAGNOSIS — S80.01XA CONTUSION OF RIGHT KNEE, INITIAL ENCOUNTER: Primary | ICD-10-CM

## 2024-04-16 DIAGNOSIS — M25.561 ACUTE PAIN OF RIGHT KNEE: ICD-10-CM

## 2024-04-16 PROCEDURE — 97530 THERAPEUTIC ACTIVITIES: CPT

## 2024-04-16 PROCEDURE — 97112 NEUROMUSCULAR REEDUCATION: CPT

## 2024-04-16 PROCEDURE — 97110 THERAPEUTIC EXERCISES: CPT

## 2024-04-16 NOTE — PROGRESS NOTES
Daily Note     Today's date: 2024  Patient name: Meka See  : 1945  MRN: [id number]  Referring provider: José Antonio Murillo PA-C  Dx:   Encounter Diagnosis     ICD-10-CM    1. Contusion of right knee, initial encounter  S80.01XA       2. Acute pain of right knee  M25.561           Start Time: 1145  Stop Time: 1225  Total time in clinic (min): 40 minutes    Subjective: Patient reports lateral knee pain at the start of today's session. States this started after getting out of her car. States she has a rental car right now, which is lower than her usual car.      Objective: See treatment diary below      Assessment: Tolerated treatment well. Patient's lateral knee pain abolished following repeated knee extension progressions. Pt educated on mechanics for car transfers, mainly limiting frontal plane movements at the knee to improve tolerance, pt verbalized good understanding/agreement. Pt also demo improved tolerance to nustep following extension progressions. Able to integrate additional gentle proximal strengthening progressions during today's session, providing pt w/ intermittent vc to maintain technique. Pt w/ fatigue of proximal mm post session. Patient will continue to benefit from skilled PT to improve functional mobility and activity tolerance.      Plan: Continue per plan of care.      Insurance:  A/CMS Eval/ Re-eval POC expires FOTO Auth #/ Referral # Total units  Start date  Expiration date Extension  Visit limitation?  PT only or  PT+OT? Co-Insurance   MCR: CMS 3/27/24 5/22/24                                                                        Date 3/27/24 4/1/24 4/3/24 4/8/24 4/10/24 4/16/24   Visit Number IE 2 3 4 5 6   Manual         R knee LAD w/ oscillation  5 min       Patellar mobs  All directions, 5x30   All directions, 8x30         Medial tibial MWM x15             Neuro Re-Ed         Bridges  3x10  3x10 3x10 3x10 With belt for hip abd iso: 3x10   Clamshells  3x10 each 3x10  "each 3x10 each     Sit to stand  Squats: 2x10 Squats with UE support, 2x10 Squats with UE support, 3x10 Squats with UE support, 3x10    Lateral stepping    15 feet, 6 laps     Step ups   4\" 3x10 on right 4\" 2x10 on right     Reverse clamshell   3x10 each      Exaggerated walking    15 feet, 6 laps     Backward walking     10 feet, 4 laps                      TherEx         Heel slides Rev for HEP Peanuts, 5\" x30 Peanuts, 5\" x30 Knee flexion stretch at step: 10\" x8  Repeated knee extension: 4x10 t/o session   Ankle pumps Rev for HEP  HR: 3x10 3x10 HR: 3x10    Quad set, SLR Rev for HEP AROM, 3x5 AROM, 3x8 AROM, 3x8 AROM, 3x8 AROM, 3x8   Knee ext: SAQ, LAQ  LAQ: 3x10 LAQ: 3x10 LAQ: 3x10 LAQ: 3x10 LAQ: 3x10   NuStep     5 min, level 1, seat 10 5 min, level 1, seat 9   Leg press     MRE, 3x10    Stand hip abd      2x10 each   Stand hip ext      2x10 each                     TherAct         Patient education Pt edu, PT POC, HEP 10 min     Pt edu, HEP 10 min                                       Gait Training                                    Modalities         CP               Precautions:   Past Medical History:   Diagnosis Date    Coronary artery disease     pt has 6 stents total-2012 x1, 5/29/12-x3, 1/9/13 x2    Diabetes mellitus (HCC)     GERD (gastroesophageal reflux disease)     High cholesterol     Hypertension     Myocardial infarction (HCC) 03/2012    x1 stent    Shortness of breath     with exertion                       "

## 2024-04-18 ENCOUNTER — OFFICE VISIT (OUTPATIENT)
Dept: PHYSICAL THERAPY | Facility: CLINIC | Age: 79
End: 2024-04-18
Payer: MEDICARE

## 2024-04-18 ENCOUNTER — APPOINTMENT (OUTPATIENT)
Dept: CARDIAC REHAB | Facility: CLINIC | Age: 79
End: 2024-04-18
Payer: MEDICARE

## 2024-04-18 DIAGNOSIS — S80.01XA CONTUSION OF RIGHT KNEE, INITIAL ENCOUNTER: Primary | ICD-10-CM

## 2024-04-18 DIAGNOSIS — M25.561 ACUTE PAIN OF RIGHT KNEE: ICD-10-CM

## 2024-04-18 PROCEDURE — 97112 NEUROMUSCULAR REEDUCATION: CPT

## 2024-04-18 PROCEDURE — 97110 THERAPEUTIC EXERCISES: CPT

## 2024-04-18 NOTE — PROGRESS NOTES
"Daily Note     Today's date: 2024  Patient name: Meka See  : 1945  MRN: [id number]  Referring provider: José Antonio Murillo PA-C  Dx:   Encounter Diagnosis     ICD-10-CM    1. Contusion of right knee, initial encounter  S80.01XA       2. Acute pain of right knee  M25.561           Start Time: 0930  Stop Time: 1010  Total time in clinic (min): 40 minutes    Subjective: Patient reports reduction in knee pain since previous session. States she has been more cautious about how she gets in and out of her car, which has also helped.      Objective: See treatment diary below      Assessment: Tolerated treatment well. Patient demo strong technique with exercises throughout today session. Provided pt w/ graded vc w/ step ups to promote quad recruitment. Patient without increase in knee pain throughout today's session, demo improving activity tolerance. Patient with fatigue of proximal mm post session. Patient will continue to benefit from skilled PT to improve functional mobility and activity tolerance.      Plan: Continue per plan of care.      Insurance:  [place]/CMS Eval/ Re-eval POC expires FOTO Auth #/ Referral # Total units  Start date  Expiration date Extension  Visit limitation?  PT only or  PT+OT? Co-Insurance   MCR: CMS 3/27/24 5/22/24                                                                        Date 4/1/24 4/3/24 4/8/24 4/10/24 4/16/24 4/18/24   Visit Number 2 3 4 5 6 7   Manual      Foto   R knee LAD w/ oscillation 5 min        Patellar mobs All directions, 5x30   All directions, 8x30         Medial tibial MWM x15              Neuro Re-Ed         Bridges 3x10  3x10 3x10 3x10 With belt for hip abd iso: 3x10 3x10   Clamshells 3x10 each 3x10 each 3x10 each      Sit to stand Squats: 2x10 Squats with UE support, 2x10 Squats with UE support, 3x10 Squats with UE support, 3x10  Squats with UE support 2x10   Lateral stepping   15 feet, 6 laps      Step ups  4\" 3x10 on right 4\" 2x10 on right   4\" " "2x10 on right   Reverse clamshell  3x10 each       Exaggerated walking   15 feet, 6 laps      Backward walking    10 feet, 4 laps                       TherEx         Heel slides Peanuts, 5\" x30 Peanuts, 5\" x30 Knee flexion stretch at step: 10\" x8  Repeated knee extension: 4x10 t/o session Repeated knee extension x10   Ankle pumps  HR: 3x10 3x10 HR: 3x10     Quad set, SLR AROM, 3x5 AROM, 3x8 AROM, 3x8 AROM, 3x8 AROM, 3x8 AROM 3x10   Knee ext: SAQ, LAQ LAQ: 3x10 LAQ: 3x10 LAQ: 3x10 LAQ: 3x10 LAQ: 3x10    NuStep    5 min, level 1, seat 10 5 min, level 1, seat 9 5 min, level 1, seat 9   Leg press    MRE, 3x10     Stand hip abd     2x10 each 2x10 each   Stand hip ext     2x10 each 2x10 each                     TherAct         Patient education     Pt edu, HEP 10 min                                        Gait Training                                    Modalities         CP               Precautions:   Past Medical History:   Diagnosis Date    Coronary artery disease     pt has 6 stents total-2012 x1, 5/29/12-x3, 1/9/13 x2    Diabetes mellitus (HCC)     GERD (gastroesophageal reflux disease)     High cholesterol     Hypertension     Myocardial infarction (HCC) 03/2012    x1 stent    Shortness of breath     with exertion                         "

## 2024-04-22 ENCOUNTER — HOSPITAL ENCOUNTER (OUTPATIENT)
Dept: RADIOLOGY | Facility: HOSPITAL | Age: 79
Discharge: HOME | End: 2024-04-22
Payer: MEDICARE

## 2024-04-22 ENCOUNTER — OFFICE VISIT (OUTPATIENT)
Dept: PHYSICAL THERAPY | Facility: CLINIC | Age: 79
End: 2024-04-22
Payer: MEDICARE

## 2024-04-22 DIAGNOSIS — S80.01XA CONTUSION OF RIGHT KNEE, INITIAL ENCOUNTER: Primary | ICD-10-CM

## 2024-04-22 DIAGNOSIS — M25.561 ACUTE PAIN OF RIGHT KNEE: ICD-10-CM

## 2024-04-22 DIAGNOSIS — R92.8 ABNORMAL SCREENING MAMMOGRAM: ICD-10-CM

## 2024-04-22 PROCEDURE — 97110 THERAPEUTIC EXERCISES: CPT

## 2024-04-22 PROCEDURE — 97112 NEUROMUSCULAR REEDUCATION: CPT

## 2024-04-22 PROCEDURE — 76642 ULTRASOUND BREAST LIMITED: CPT

## 2024-04-22 PROCEDURE — 77065 DX MAMMO INCL CAD UNI: CPT

## 2024-04-22 PROCEDURE — G0279 TOMOSYNTHESIS, MAMMO: HCPCS

## 2024-04-22 NOTE — PROGRESS NOTES
"Daily Note     Today's date: 2024  Patient name: Meka See  : 1945  MRN: [id number]  Referring provider: José Antonio Murillo PA-C  Dx:   Encounter Diagnosis     ICD-10-CM    1. Contusion of right knee, initial encounter  S80.01XA       2. Acute pain of right knee  M25.561                      Subjective: Pt does not report any changes in pain since last session.       Objective: See treatment diary below      Assessment: Tolerated treatment well. Pt demonstrated poor quad control during lateral step ups and requested 4\" step instead of 6\". Pt performed quad sets with therapist pressure over superior aspect of patell and pt demonstrated improved quad control during leg press. Patient demonstrated fatigue post treatment, exhibited good technique with therapeutic exercises, and would benefit from continued PT to address quad strength and motor control deficits.       Plan: Continue per plan of care.      Insurance:  [place]/CMS Eval/ Re-eval POC expires FOTO Auth #/ Referral # Total units  Start date  Expiration date Extension  Visit limitation?  PT only or  PT+OT? Co-Insurance   MCR: CMS 3/27/24 5/22/24                                                                        Date 4/22/24 4/3/24 4/8/24 4/10/24 4/16/24 4/18/24   Visit Number 8 3 4 5 6 7   Manual      Foto   R knee LAD w/ oscillation         Patellar mobs All directions 5x30    All directions, 8x30         Medial tibial MWM x15              Neuro Re-Ed         Bridges 3x10  3x10 3x10 3x10 With belt for hip abd iso: 3x10 3x10   Clamshells  3x10 each 3x10 each      Sit to stand Squats with UE support: 2x10 Squats with UE support, 2x10 Squats with UE support, 3x10 Squats with UE support, 3x10  Squats with UE support 2x10   Lateral stepping Hurdles (2)  2x10   15 feet, 6 laps      Step ups 2\" 3x10 on right  4\" 3x10 on right 4\" 2x10 on right   4\" 2x10 on right   Reverse clamshell  3x10 each       Exaggerated walking   15 feet, 6 laps      Backward " "walking    10 feet, 4 laps     Quad set with hand over patella  5\" x10                 TherEx         Heel slides  Peanuts, 5\" x30 Knee flexion stretch at step: 10\" x8  Repeated knee extension: 4x10 t/o session Repeated knee extension x10   Ankle pumps  HR: 3x10 3x10 HR: 3x10     Quad set, SLR AROM, 3x5 AROM, 3x8 AROM, 3x8 AROM, 3x8 AROM, 3x8 AROM 3x10   Knee ext: SAQ, LAQ LAQ: 3x10 LAQ: 3x10 LAQ: 3x10 LAQ: 3x10 LAQ: 3x10    NuStep    5 min, level 1, seat 10 5 min, level 1, seat 9 5 min, level 1, seat 9   Rec Bike  TWU 5 min lvl`        Leg press 95# 2x8    MRE, 3x10     Stand hip abd 2x10    2x10 each 2x10 each   Stand hip ext 2x10    2x10 each 2x10 each                     TherAct         Patient education     Pt edu, HEP 10 min                                        Gait Training                                    Modalities         CP               Precautions:   Past Medical History:   Diagnosis Date    Coronary artery disease     pt has 6 stents total-2012 x1, 5/29/12-x3, 1/9/13 x2    Diabetes mellitus (HCC)     GERD (gastroesophageal reflux disease)     High cholesterol     Hypertension     Myocardial infarction (HCC) 03/2012    x1 stent    Shortness of breath     with exertion                           "

## 2024-04-23 DIAGNOSIS — R92.8 ABNORMAL MAMMOGRAM OF LEFT BREAST: Primary | ICD-10-CM

## 2024-04-23 DIAGNOSIS — N63.20 MASS OF LEFT BREAST, UNSPECIFIED QUADRANT: ICD-10-CM

## 2024-04-24 ENCOUNTER — OFFICE VISIT (OUTPATIENT)
Dept: PHYSICAL THERAPY | Facility: CLINIC | Age: 79
End: 2024-04-24
Payer: MEDICARE

## 2024-04-24 DIAGNOSIS — S80.01XA CONTUSION OF RIGHT KNEE, INITIAL ENCOUNTER: Primary | ICD-10-CM

## 2024-04-24 DIAGNOSIS — M25.561 ACUTE PAIN OF RIGHT KNEE: ICD-10-CM

## 2024-04-24 PROCEDURE — 97530 THERAPEUTIC ACTIVITIES: CPT

## 2024-04-24 PROCEDURE — 97112 NEUROMUSCULAR REEDUCATION: CPT

## 2024-04-24 PROCEDURE — 97110 THERAPEUTIC EXERCISES: CPT

## 2024-04-24 NOTE — PROGRESS NOTES
"Daily Note     Today's date: 2024  Patient name: Meka See  : 1945  MRN: [id number]  Referring provider: José Antonio Murillo PA-C  Dx:   Encounter Diagnosis     ICD-10-CM    1. Contusion of right knee, initial encounter  S80.01XA       2. Acute pain of right knee  M25.561           Start Time: 1015  Stop Time: 1055  Total time in clinic (min): 40 minutes    Subjective: Patient reports mild anterolateral knee pain at the start of today's session.      Objective: See treatment diary below      Assessment: Tolerated treatment well. Patient's anterolateral knee pain abolished following repeated knee extension with tibial ER bias and manual tibial IR/ER mobilizations. Mild increase in tightness noted about right quad compared to left, initiated standing hip flexor stretch to strong tolerance. Limited variation and intensity of today's session secondary to lingering irritability of symptoms. Patient will continue to benefit from skilled PT to improve functional mobility and activity tolerance.      Plan: Continue per plan of care.      Insurance:  [employer]/CMS Eval/ Re-eval POC expires FOTO Auth #/ Referral # Total units  Start date  Expiration date Extension  Visit limitation?  PT only or  PT+OT? Co-Insurance   MCR: CMS 3/27/24 5/22/24                                                                        Date 4/8/24 4/10/24 4/16/24 4/18/24 4/22/24 4/24/24   Visit Number 4 5 6 7 8 9   Manual    Foto     R knee LAD w/ oscillation         Patellar mobs  All directions, 8x30   All directions 5x30       Medial tibial MWM x15    Tibial IR/ER oscillation 3x30            Neuro Re-Ed         Bridges 3x10 3x10 With belt for hip abd iso: 3x10 3x10 3x10  3x10   Clamshells 3x10 each        Sit to stand Squats with UE support, 3x10 Squats with UE support, 3x10  Squats with UE support 2x10 Squats with UE support: 2x10    Lateral stepping 15 feet, 6 laps    Hurdles (2)  2x10     Step ups 4\" 2x10 on right   4\" 2x10 on right " "2\" 3x10 on right     Reverse clamshell         Exaggerated walking 15 feet, 6 laps        Backward walking  10 feet, 4 laps       Quad set with hand over patella      5\" x10             TherEx         Heel slides Knee flexion stretch at step: 10\" x8  Repeated knee extension: 4x10 t/o session Repeated knee extension x10  Repeated knee extension x10, with ER 2x10   Ankle pumps 3x10 HR: 3x10       Quad set, SLR AROM, 3x8 AROM, 3x8 AROM, 3x8 AROM 3x10 AROM, 3x5 AROM 3x10   Knee ext: SAQ, LAQ LAQ: 3x10 LAQ: 3x10 LAQ: 3x10  LAQ: 3x10    NuStep  5 min, level 1, seat 10 5 min, level 1, seat 9 5 min, level 1, seat 9     Rec Bike      TWU 5 min lvl`    Leg press  MRE, 3x10   95# 2x8     Stand hip abd   2x10 each 2x10 each 2x10 2x10 each   Stand hip ext   2x10 each 2x10 each 2x10 2x10 each   Hip flexor stretching      Manual prone quad stretch on right: 3x30\"    Standing hip flexor stretch: 10\" x5 each            TherAct         Patient education   Pt edu, HEP 10 min   Pt edu, PT POC, HEP 10 min                                       Gait Training                                    Modalities         CP               Precautions:   Past Medical History:   Diagnosis Date    Coronary artery disease     pt has 6 stents total-2012 x1, 5/29/12-x3, 1/9/13 x2    Diabetes mellitus (HCC)     GERD (gastroesophageal reflux disease)     High cholesterol     Hypertension     Myocardial infarction (HCC) 03/2012    x1 stent    Shortness of breath     with exertion                             "

## 2024-04-26 ENCOUNTER — RA CDI HCC (OUTPATIENT)
Dept: OTHER | Facility: HOSPITAL | Age: 79
End: 2024-04-26

## 2024-04-26 NOTE — PROGRESS NOTES
HCC coding opportunities          Chart Reviewed number of suggestions sent to Provider: 2   E11.65  I13.0  Patients Insurance     Medicare Insurance: Medicare

## 2024-04-30 ENCOUNTER — OFFICE VISIT (OUTPATIENT)
Dept: PHYSICAL THERAPY | Facility: CLINIC | Age: 79
End: 2024-04-30
Payer: MEDICARE

## 2024-04-30 ENCOUNTER — APPOINTMENT (OUTPATIENT)
Dept: LAB | Facility: HOSPITAL | Age: 79
End: 2024-04-30
Payer: MEDICARE

## 2024-04-30 DIAGNOSIS — I25.118 CORONARY ARTERY DISEASE OF NATIVE ARTERY OF NATIVE HEART WITH STABLE ANGINA PECTORIS (HCC): ICD-10-CM

## 2024-04-30 DIAGNOSIS — M25.561 ACUTE PAIN OF RIGHT KNEE: ICD-10-CM

## 2024-04-30 DIAGNOSIS — S80.01XA CONTUSION OF RIGHT KNEE, INITIAL ENCOUNTER: Primary | ICD-10-CM

## 2024-04-30 DIAGNOSIS — E55.9 VITAMIN D INSUFFICIENCY: ICD-10-CM

## 2024-04-30 DIAGNOSIS — E78.5 DYSLIPIDEMIA: ICD-10-CM

## 2024-04-30 DIAGNOSIS — N18.2 TYPE 2 DIABETES MELLITUS WITH STAGE 2 CHRONIC KIDNEY DISEASE, WITHOUT LONG-TERM CURRENT USE OF INSULIN  (HCC): ICD-10-CM

## 2024-04-30 DIAGNOSIS — E11.22 TYPE 2 DIABETES MELLITUS WITH STAGE 2 CHRONIC KIDNEY DISEASE, WITHOUT LONG-TERM CURRENT USE OF INSULIN  (HCC): ICD-10-CM

## 2024-04-30 DIAGNOSIS — Z13.0 SCREENING FOR DEFICIENCY ANEMIA: ICD-10-CM

## 2024-04-30 DIAGNOSIS — Z13.29 SCREENING FOR THYROID DISORDER: ICD-10-CM

## 2024-04-30 LAB
25(OH)D3 SERPL-MCNC: 31.9 NG/ML (ref 30–100)
CHOLEST SERPL-MCNC: 139 MG/DL
CREAT UR-MCNC: 122.7 MG/DL
ERYTHROCYTE [DISTWIDTH] IN BLOOD BY AUTOMATED COUNT: 12.6 % (ref 11.6–15.1)
HCT VFR BLD AUTO: 39.4 % (ref 34.8–46.1)
HDLC SERPL-MCNC: 46 MG/DL
HGB BLD-MCNC: 13.2 G/DL (ref 11.5–15.4)
LDLC SERPL CALC-MCNC: 59 MG/DL (ref 0–100)
MCH RBC QN AUTO: 30.3 PG (ref 26.8–34.3)
MCHC RBC AUTO-ENTMCNC: 33.5 G/DL (ref 31.4–37.4)
MCV RBC AUTO: 91 FL (ref 82–98)
MICROALBUMIN UR-MCNC: 27.5 MG/L
MICROALBUMIN/CREAT 24H UR: 22 MG/G CREATININE (ref 0–30)
PLATELET # BLD AUTO: 254 THOUSANDS/UL (ref 149–390)
PMV BLD AUTO: 10.8 FL (ref 8.9–12.7)
RBC # BLD AUTO: 4.35 MILLION/UL (ref 3.81–5.12)
TRIGL SERPL-MCNC: 171 MG/DL
TSH SERPL DL<=0.05 MIU/L-ACNC: 4.02 UIU/ML (ref 0.45–4.5)
WBC # BLD AUTO: 7.22 THOUSAND/UL (ref 4.31–10.16)

## 2024-04-30 PROCEDURE — 80053 COMPREHEN METABOLIC PANEL: CPT

## 2024-04-30 PROCEDURE — 97530 THERAPEUTIC ACTIVITIES: CPT

## 2024-04-30 PROCEDURE — 82043 UR ALBUMIN QUANTITATIVE: CPT

## 2024-04-30 PROCEDURE — 80061 LIPID PANEL: CPT

## 2024-04-30 PROCEDURE — 82306 VITAMIN D 25 HYDROXY: CPT

## 2024-04-30 PROCEDURE — 84443 ASSAY THYROID STIM HORMONE: CPT

## 2024-04-30 PROCEDURE — 97112 NEUROMUSCULAR REEDUCATION: CPT

## 2024-04-30 PROCEDURE — 85027 COMPLETE CBC AUTOMATED: CPT

## 2024-04-30 PROCEDURE — 97110 THERAPEUTIC EXERCISES: CPT

## 2024-04-30 PROCEDURE — 82570 ASSAY OF URINE CREATININE: CPT

## 2024-04-30 PROCEDURE — 36415 COLL VENOUS BLD VENIPUNCTURE: CPT

## 2024-04-30 NOTE — PROGRESS NOTES
Re-Evaluation    Today's date: 2024  Patient name: Meka See  : 1945  MRN: [id number]  Referring provider: José Antonio Murillo PA-C  Dx:   Encounter Diagnosis     ICD-10-CM    1. Contusion of right knee, initial encounter  S80.01XA       2. Acute pain of right knee  M25.561           Start Time: 1100  Stop Time: 1140  Total time in clinic (min): 40 minutes    Subjective: Patient reports ~50% improvement in pain and function since starting physical therapy. Reports continued difficulty with stair negotiation, descending > ascending. Reports overall ease of movement, particularly with transfers. States intensity of pain has decreased overall.    Goals  Short Term Goals (4 weeks):    - Patient will be independent in basic HEP 2-3 weeks  - Patient will report >50% reduction in pain  - Patient will demonstrate >1/3 improvement in MMT grade as applicable  - Patient will demo equal, pain free, knee ROM bilaterally    Long Term Goals (8 weeks):  - Patient will be independent in a comprehensive home exercise program  - Patient FOTO score will improve to >65/100  - Patient will self-report >75% improvement in function  - Patient will perform STS transfers with min use of hands  - Patient will negotiate stairs with reciprocal pattern      Objective: See treatment diary below    Sensation  - Light touch: diminished sensation noted L4 dermatome on right, all others grossly equal (24: grossly equal)    LE MMT  LEFT  RIGHT  -Hip Flexion:   4/5  3+/5 (24: 4-/5)  -Hip Abduction: 3+/5  3+/5 (24: 4-/5)  -Hip Adduction: 4/5  4/5 (24: 4/5)  -Hip IR:  4+/5  4/5 (24: 4/5)  -Hip ER:  4+/5  4/5 (24: 4/5)    -Knee Flexion  4+/5  4+/5 (24: 4+/5)  -Knee Extension 4+/5  4/5 (24: 4/5)    -Ankle DF  5/5  5/5 (24: 5/5)  -Ankle PF  5/5  5/5 (24: 5/5)    Knee Range of Motion    LEFT  RIGHT  - Flexion 130  125 (24: 130)  - Extension 0  0 (24: 0)     Palpation  - Positive:  lateral joint line, patellar tendon, quad tendon, superior tip of patella (4/30/24: mild TTP noted at quad tendon)    Mobility Assessment  Mild restriction with patellar mobility on right  Fib head WNL  Tibial IR/ER WNL   (4/30/24: WNL)    Functional Assessment  -Functional Squat: achieves 50% of motion with anterior knee pain (4/30/24: achieves 50% of motion with minimal knee pain)  -Gait Assessment: antalgic gait with decreased knee flexion on right (4/30/24: grossly similar)  -Patient with 1+ pitting edema bilaterally   (4/30/24: grossly similar)      Assessment: Patient has made gains in strength, range of motion, and functional mobility since starting physical therapy. She has demonstrated tolerance to transfers and ambulation. Despite these gains, she continues to experience irritability of symptoms about superior aspect of patella, particularly over quad tendon, resulting in increased discomfort with stair negotiation. As the patient is progressing well towards previously set goals, she remains a strong candidate to benefit from skilled PT. Plan to continue to address underlying muscular power deficits with progressing in intensity functional mobility/patterns. PT POC and HEP were updated during today's session, pt expressed no questions/concerns with updates made to POC.      Plan: Continue per plan of care.      Insurance:  [place]/CMS Eval/ Re-eval POC expires FOTO Auth #/ Referral # Total units  Start date  Expiration date Extension  Visit limitation?  PT only or  PT+OT? Co-Insurance   MCR: CMS 3/27/24 5/22/24                                                                        Date 4/10/24 4/16/24 4/18/24 4/22/24 4/24/24 4/30/24   Visit Number 5 6 7 8 9 10 - re-eval   Manual   Foto      R knee LAD w/ oscillation         Patellar mobs All directions, 8x30   All directions 5x30       Medial tibial MWM x15    Tibial IR/ER oscillation 3x30             Neuro Re-Ed         Bridges 3x10 With belt for hip abd iso:  "3x10 3x10 3x10  3x10    Clamshells         Sit to stand Squats with UE support, 3x10  Squats with UE support 2x10 Squats with UE support: 2x10     Lateral stepping    Hurdles (2)  2x10   Lateral stepping: 15 feet, 4 laps   Step ups   4\" 2x10 on right 2\" 3x10 on right   Lateral step downs: 4\" 2x10 each   Reverse clamshell         Exaggerated walking         Backward walking 10 feet, 4 laps     Backward walking: 15 feet, 4 laps   Quad set with hand over patella     5\" x10              TherEx         Heel slides  Repeated knee extension: 4x10 t/o session Repeated knee extension x10  Repeated knee extension x10, with ER 2x10    Ankle pumps HR: 3x10     HR off 4\" step: 3x10   Quad set, SLR AROM, 3x8 AROM, 3x8 AROM 3x10 AROM, 3x5 AROM 3x10 Seated march: 3# 3x10 on right   Knee ext: SAQ, LAQ LAQ: 3x10 LAQ: 3x10  LAQ: 3x10  LAQ: 3#, 3x10 on right   NuStep 5 min, level 1, seat 10 5 min, level 1, seat 9 5 min, level 1, seat 9      Rec Bike     TWU 5 min lvl`     Leg press MRE, 3x10   95# 2x8      Stand hip abd  2x10 each 2x10 each 2x10 2x10 each 3x10 each   Stand hip ext  2x10 each 2x10 each 2x10 2x10 each 3x10 each   Hip flexor stretching     Manual prone quad stretch on right: 3x30\"    Standing hip flexor stretch: 10\" x5 each Standing hip flexor stretch: 10\" x5 each            TherAct         Patient education  Pt edu, HEP 10 min   Pt edu, PT POC, HEP 10 min Re-eval, PT POC 10 min                                       Gait Training                                    Modalities         CP               Precautions:   Past Medical History:   Diagnosis Date    Coronary artery disease     pt has 6 stents total-2012 x1, 5/29/12-x3, 1/9/13 x2    Diabetes mellitus (HCC)     GERD (gastroesophageal reflux disease)     High cholesterol     Hypertension     Myocardial infarction (HCC) 03/2012    x1 stent    Shortness of breath     with exertion                               "

## 2024-05-01 ENCOUNTER — TELEPHONE (OUTPATIENT)
Dept: ADMINISTRATIVE | Facility: OTHER | Age: 79
End: 2024-05-01

## 2024-05-01 NOTE — TELEPHONE ENCOUNTER
05/01/24 3:23 PM    Patient contacted (left message) to bring Advance Directive, POLST, or Living Will document to next scheduled pcp visit.    Thank you.  Virginia Romero MA  [facility]

## 2024-05-02 ENCOUNTER — OFFICE VISIT (OUTPATIENT)
Dept: PHYSICAL THERAPY | Facility: CLINIC | Age: 79
End: 2024-05-02
Payer: MEDICARE

## 2024-05-02 DIAGNOSIS — M25.561 ACUTE PAIN OF RIGHT KNEE: ICD-10-CM

## 2024-05-02 DIAGNOSIS — S80.01XA CONTUSION OF RIGHT KNEE, INITIAL ENCOUNTER: Primary | ICD-10-CM

## 2024-05-02 PROCEDURE — 97112 NEUROMUSCULAR REEDUCATION: CPT

## 2024-05-02 PROCEDURE — 97110 THERAPEUTIC EXERCISES: CPT

## 2024-05-02 NOTE — PROGRESS NOTES
"Daily Note     Today's date: 2024  Patient name: Meka See  : 1945  MRN: [id number]  Referring provider: José Antonio Murillo PA-C  Dx:   Encounter Diagnosis     ICD-10-CM    1. Contusion of right knee, initial encounter  S80.01XA       2. Acute pain of right knee  M25.561           Start Time: 1100  Stop Time: 1145  Total time in clinic (min): 45 minutes    Subjective: Pt states \"her knee will tell her when it is bothering her\"      Objective: See treatment diary below      Assessment: Tolerated treatment well. Patient  demonstrated improved quad control on eccentric lateral step down with the increased step height of 6 inches. Pt noted slight pain in superior aspect of patella, which was relieved with quad stretch in supine. Pt was advised to continue stretches with onset of pain/soreness in knee. Pt would benefit from continued PT to progress and maximize quad control in R knee.       Plan: Continue per plan of care.      Insurance:  [employer]/CMS Eval/ Re-eval POC expires FOTO Auth #/ Referral # Total units  Start date  Expiration date Extension  Visit limitation?  PT only or  PT+OT? Co-Insurance   MCR: CMS 3/27/24 5/22/24                                                                        Date 24   Visit Number 6 7 8 9 10 - re-eval 11   Manual  Foto       R knee LAD w/ oscillation         Patellar mobs   All directions 5x30           Tibial IR/ER oscillation 3x30              Neuro Re-Ed         Bridges With belt for hip abd iso: 3x10 3x10 3x10  3x10  3 x10   Clamshells         Sit to stand  Squats with UE support 2x10 Squats with UE support: 2x10   Squats 3x10    Lateral stepping   Hurdles (2)  2x10   Lateral stepping: 15 feet, 4 laps    Step ups  4\" 2x10 on right 2\" 3x10 on right   Lateral step downs: 4\" 2x10 each Lateral step down 6\": 2x10 ea   Reverse clamshell         Exaggerated walking         Backward walking     Backward walking: 15 feet, 4 laps " "Backward walking/ high knee fwd 15   Quad set with hand over patella    5\" x10               TherEx         Heel slides Repeated knee extension: 4x10 t/o session Repeated knee extension x10  Repeated knee extension x10, with ER 2x10     Ankle pumps     HR off 4\" step: 3x10 HR off 4\" step: 3x10   Quad set, SLR AROM, 3x8 AROM 3x10 AROM, 3x5 AROM 3x10 Seated march: 3# 3x10 on right Seated march: 3# 3x10 on right   Knee ext: SAQ, LAQ LAQ: 3x10  LAQ: 3x10  LAQ: 3#, 3x10 on right LAQ: 3#, 3x10 on right   NuStep 5 min, level 1, seat 9 5 min, level 1, seat 9       Rec Bike    TWU 5 min lvl`   TWU 5 min lvl 1   Leg press   95# 2x8       Stand hip abd 2x10 each 2x10 each 2x10 2x10 each 3x10 each 3x10 ea   Stand hip ext 2x10 each 2x10 each 2x10 2x10 each 3x10 each 3x10 ea   Hip flexor stretching    Manual prone quad stretch on right: 3x30\"    Standing hip flexor stretch: 10\" x5 each Standing hip flexor stretch: 10\" x5 each Supine hip flexor stretch   3x 30\" ea            TherAct         Patient education Pt edu, HEP 10 min   Pt edu, PT POC, HEP 10 min Re-eval, PT POC 10 min                                        Gait Training                                    Modalities         CP               Precautions:   Past Medical History:   Diagnosis Date    Coronary artery disease     pt has 6 stents total-2012 x1, 5/29/12-x3, 1/9/13 x2    Diabetes mellitus (HCC)     GERD (gastroesophageal reflux disease)     High cholesterol     Hypertension     Myocardial infarction (HCC) 03/2012    x1 stent    Shortness of breath     with exertion                                 "

## 2024-05-03 ENCOUNTER — OFFICE VISIT (OUTPATIENT)
Dept: FAMILY MEDICINE CLINIC | Facility: CLINIC | Age: 79
End: 2024-05-03
Payer: MEDICARE

## 2024-05-03 VITALS
WEIGHT: 208 LBS | HEIGHT: 65 IN | BODY MASS INDEX: 34.66 KG/M2 | RESPIRATION RATE: 18 BRPM | HEART RATE: 68 BPM | DIASTOLIC BLOOD PRESSURE: 68 MMHG | SYSTOLIC BLOOD PRESSURE: 124 MMHG | TEMPERATURE: 98.4 F

## 2024-05-03 DIAGNOSIS — E11.22 TYPE 2 DIABETES MELLITUS WITH STAGE 2 CHRONIC KIDNEY DISEASE, WITHOUT LONG-TERM CURRENT USE OF INSULIN  (HCC): Primary | ICD-10-CM

## 2024-05-03 DIAGNOSIS — I25.10 CORONARY ARTERY DISEASE INVOLVING NATIVE CORONARY ARTERY OF NATIVE HEART WITHOUT ANGINA PECTORIS: ICD-10-CM

## 2024-05-03 DIAGNOSIS — F17.210 SMOKING GREATER THAN 20 PACK YEARS: ICD-10-CM

## 2024-05-03 DIAGNOSIS — N18.2 TYPE 2 DIABETES MELLITUS WITH STAGE 2 CHRONIC KIDNEY DISEASE, WITHOUT LONG-TERM CURRENT USE OF INSULIN  (HCC): Primary | ICD-10-CM

## 2024-05-03 DIAGNOSIS — E78.5 DYSLIPIDEMIA: ICD-10-CM

## 2024-05-03 DIAGNOSIS — I10 PRIMARY HYPERTENSION: ICD-10-CM

## 2024-05-03 DIAGNOSIS — R40.0 DAYTIME SLEEPINESS: ICD-10-CM

## 2024-05-03 DIAGNOSIS — R06.83 SNORING: ICD-10-CM

## 2024-05-03 PROCEDURE — G2211 COMPLEX E/M VISIT ADD ON: HCPCS | Performed by: NURSE PRACTITIONER

## 2024-05-03 PROCEDURE — 99214 OFFICE O/P EST MOD 30 MIN: CPT | Performed by: NURSE PRACTITIONER

## 2024-05-03 RX ORDER — MULTIVIT-MIN/IRON/FOLIC ACID/K 18-600-40
CAPSULE ORAL
COMMUNITY

## 2024-05-03 NOTE — PATIENT INSTRUCTIONS
Diabetes and Exercise   WHAT YOU NEED TO KNOW:   Physical activity, such as exercise, can help keep your blood sugar level steady or improve insulin resistance. Activity can help decrease your risk for heart disease, and help you lose weight, if needed. Exercise can also help lower your A1c or keep it at goal. Your diabetes care team provider will help you create an exercise plan. The plan will be based on the type of diabetes you have and your starting fitness level.  DISCHARGE INSTRUCTIONS:   Call your local emergency number (911 in the ) if:   You have chest pain or shortness of breath.      Return to the emergency department if:   You have a low blood sugar level and it does not improve with treatment. Symptoms are trouble thinking, a pounding heartbeat, and sweating.    Your blood sugar level is above 240 mg/dL and does not come down within 15 minutes of treatment.    You have blurred or double vision.    Your breath has a fruity, sweet smell, or your breathing is shallow.    Call your doctor or diabetes care team if:   You have ketones in your blood or urine.    You have a fever.    Your blood sugar levels are higher than your target goals.    You often have low blood sugar levels.    Your skin is red, dry, warm, or swollen.    You have a wound that does not heal.    You have trouble coping with diabetes, or you feel anxious or depressed.    You have questions or concerns about your condition or care.    Tips to help you create and meet your exercise goals:   Set a goal for at least 150 minutes (2.5 hours) of moderate to vigorous aerobic activity each week.  Aerobic activity helps your heart stay strong. Aerobic activity includes walking, bicycling, dancing, swimming, and raking leaves. Spread aerobic activity over 3 to 5 days. Do not take more than 2 days off in a row. It is best to do at least 10 minutes at a time and 30 minutes each day. You can work up to these goals. Remember that any activity is better  than no activity. Over time, you can make exercise more intense or last longer. You can also add more days of exercise as your fitness level improves. Your diabetes care team can help you make a step-by-step plan to achieve your goals.         Set a strength training goal of 2 to 3 times a week.  Take at least 1 day off in between strength training sessions. Strength training helps you keep the muscles you have and build new muscles. Strength training includes lifting weights, climbing stairs, yoga, and galdino chi.         Older adults should include balance training 2 to 3 times each week.  These include walking backwards, standing on one foot, and walking heel to toe in a straight line.             Other healthy activity tips:   Stretch before and after you exercise to prevent injury.         Drink water or liquids that do not contain sugar before, during, and after exercise. Ask your dietitian or healthcare provider which liquids you should drink when you exercise.    Do not  sit for longer than 30 minutes at a time during your day. If you cannot walk around, at least stand up. This will help you stay active and keep your blood circulating. Try to be active throughout your day.       Exercise and blood sugar levels:  Check your blood sugar level before and after exercise, if you use insulin. Healthcare providers may tell you to change the amount of insulin you take or food you eat.  If your blood sugar level is high, check your blood or urine for ketones before you exercise. Do not exercise if your blood sugar level is high and you have ketones.     If your blood sugar level is less than 100 mg/dL, have a carbohydrate snack before you exercise. Examples are 4 to 6 crackers, ½ banana, 8 ounces (1 cup) of milk, or 4 ounces (½ cup) of juice.       Follow up with your doctor or diabetes care team provider as directed:  Your doctor or provider may recommend counseling to help you meet your exercise goals. Write down your  questions so you remember to ask them during your visits.  © Copyright [employer] 2023 Information is for End User's use only and may not be sold, redistributed or otherwise used for commercial purposes.  The above information is an  only. It is not intended as medical advice for individual conditions or treatments. Talk to your doctor, nurse or pharmacist before following any medical regimen to see if it is safe and effective for you.

## 2024-05-03 NOTE — PROGRESS NOTES
Assessment/Plan:    1. Type 2 diabetes mellitus with stage 2 chronic kidney disease, without long-term current use of insulin  (HCC)  Assessment & Plan:  Continue with current regimen  Lab Results   Component Value Date    HGBA1C 7.3 (H) 04/30/2024     Orders:  -     Hemoglobin A1C; Future; Expected date: 09/03/2024  -     Comprehensive metabolic panel; Future; Expected date: 09/03/2024    2. Dyslipidemia  -     Lipid Panel with Direct LDL reflex; Future; Expected date: 09/03/2024  -     Comprehensive metabolic panel; Future; Expected date: 09/03/2024    3. Snoring  -     Ambulatory Referral to Sleep Medicine; Future    4. Daytime sleepiness  -     Ambulatory Referral to Sleep Medicine; Future    5. Smoking greater than 20 pack years  -     CT lung screening program; Future; Expected date: 05/03/2024    6. Coronary artery disease involving native coronary artery of native heart without angina pectoris  Assessment & Plan:  Managed by cardiologist      7. Primary hypertension  Assessment & Plan:  stable            BMI Counseling: Body mass index is 35.15 kg/m². Discussed the patient's BMI with her. The BMI is above normal. Nutrition recommendations include reducing portion sizes, decreasing overall calorie intake, 3-5 servings of fruits/vegetables daily, reducing fast food intake, consuming healthier snacks, decreasing soda and/or juice intake, moderation in carbohydrate intake, increasing intake of lean protein, reducing intake of saturated fat and trans fat, and reducing intake of cholesterol. Exercise recommendations include exercising 3-5 times per week, joining a gym, and strength training exercises.    Patient Instructions:    Return in about 6 months (around 11/3/2024) for Annual physical.      Future Appointments   Date Time Provider Department Center   5/7/2024 11:00 AM [facility] [place] [facility]   5/9/2024 10:15 AM Yazmin Zepeda PT [place] [facility]   5/14/2024 10:15 AM Yazmin  "[place], PT [place] [facility]   5/16/2024  9:30 AM Yazmin Zepeda, PT [place] [facility]   6/13/2024 12:00 PM [place] CARDIAC REHAB EVAL ROOM [place] Card [facility] [address]   10/1/2024 10:40 AM Lisayamilka Sridhar DO [facility]   10/30/2024 10:30 AM DAVID Jones [facility]           Subjective:      Patient ID: Meka See is a 78 y.o. female.    Chief Complaint   Patient presents with   • Follow-up     YC         Vitals:  /68   Pulse 68   Temp 98.4 °F (36.9 °C) (Tympanic)   Resp 18   Ht 5' 4.5\" (1.638 m)   Wt 94.3 kg (208 lb)   BMI 35.15 kg/m²     Patient is here to follow up on chronic conditions.  Blood work discussed and reviewed with patient.  Does snores and having daytime sleepiness from years and had sleep study done 10 years ago and showed mild sleep apnea but never used CPAP. Discussed about the risks and possible complications related to untreated JUAN F including hypertension, heart failure, arrhythmia, MI and stroke and advised to follow up with sleep medicine and patient agrees.  Stated that never been diagnosed with COPD and removed diagnosis.  Will be starting cardiac rehab. Had hurt her right knee and currently under PT.  Stated that always had soft stool from years but no frequency                     PHQ-2/9 Depression Screening    Little interest or pleasure in doing things: 0 - not at all  Feeling down, depressed, or hopeless: 0 - not at all  PHQ-2 Score: 0  PHQ-2 Interpretation: Negative depression screen             The following portions of the patient's history were reviewed and updated as appropriate: allergies, current medications, past family history, past medical history, past social history, past surgical history and problem list.      Review of Systems   HENT: Negative.     Respiratory: Negative.     Cardiovascular: Negative.    Gastrointestinal: Negative.    Neurological: Negative.          Objective:    Social History     Tobacco Use   Smoking Status " Former   • Current packs/day: 0.00   • Average packs/day: 0.5 packs/day for 45.0 years (22.5 ttl pk-yrs)   • Types: Cigarettes   • Start date: 6/15/1967   • Quit date: 6/15/2012   • Years since quittin.8   • Passive exposure: Never   Smokeless Tobacco Never       Allergies:   Allergies   Allergen Reactions   • Prasugrel Rash     effient         Current Outpatient Medications   Medication Sig Dispense Refill   • amLODIPine (NORVASC) 5 mg tablet Take 1 tablet (5 mg total) by mouth daily 90 tablet 3   • ASPIRIN 81 PO Take 81 mg by mouth every morning     • atorvastatin (LIPITOR) 80 mg tablet Take 1 tablet (80 mg total) by mouth daily 90 tablet 3   • Calcium Carbonate (CALTRATE 600 PO) Take by mouth in the morning     • carvedilol (COREG) 12.5 mg tablet Take 1 tablet (12.5 mg total) by mouth 2 (two) times a day with meals 180 tablet 3   • esomeprazole (NexIUM) 40 MG capsule Take 40 mg by mouth every morning     • estradiol (ESTRACE VAGINAL) 0.1 mg/g vaginal cream Apply pea size amount around the opening of the vagina daily x 14 days, followed by twice weekly. 42.5 g 1   • meclizine (ANTIVERT) 12.5 MG tablet Take 1 tablet (12.5 mg total) by mouth 3 (three) times a day as needed for dizziness 30 tablet 0   • metFORMIN (GLUCOPHAGE-XR) 500 mg 24 hr tablet Take 2 tablets (1,000 mg total) by mouth 2 (two) times a day with meals 360 tablet 1   • ramipril (ALTACE) 10 MG capsule Take 1 capsule (10 mg total) by mouth daily at bedtime 90 capsule 3   • Triprolidine-Pseudoephedrine (ANTIHISTAMINE PO) Take by mouth daily at bedtime     • Vitamin D, Cholecalciferol, 25 MCG (1000 UT) TABS Take by mouth     • albuterol (ProAir HFA) 90 mcg/act inhaler Inhale 2 puffs every 4 (four) hours as needed for wheezing or shortness of breath (Patient not taking: Reported on 5/3/2024) 8.5 g 7     No current facility-administered medications for this visit.          Physical Exam  Constitutional:       Appearance: Normal appearance.   HENT:       Head: Normocephalic and atraumatic.      Nose: Nose normal.   Eyes:      Conjunctiva/sclera: Conjunctivae normal.   Cardiovascular:      Rate and Rhythm: Normal rate and regular rhythm.      Pulses: no weak pulses.           Dorsalis pedis pulses are 1+ on the right side and 1+ on the left side.        Posterior tibial pulses are 1+ on the right side and 1+ on the left side.      Heart sounds: Normal heart sounds.   Pulmonary:      Effort: Pulmonary effort is normal.      Breath sounds: Normal breath sounds.   Feet:      Right foot:      Skin integrity: No ulcer, skin breakdown, erythema, warmth, callus or dry skin.      Left foot:      Skin integrity: No ulcer, skin breakdown, erythema, warmth, callus or dry skin.   Skin:     General: Skin is warm and dry.      Findings: No rash.   Neurological:      Mental Status: She is alert and oriented to person, place, and time.   Psychiatric:         Mood and Affect: Mood normal.         Behavior: Behavior normal.         Thought Content: Thought content normal.         Judgment: Judgment normal.               Patient's shoes and socks removed.    Right Foot/Ankle   Right Foot Inspection  Skin Exam: skin normal and skin intact. No dry skin, no warmth, no callus, no erythema, no maceration, no abnormal color, no pre-ulcer, no ulcer and no callus.     Toe Exam: ROM and strength within normal limits. No swelling, no tenderness, erythema and  no right toe deformity    Sensory   Vibration: intact  Proprioception: intact  Monofilament testing: intact    Vascular  Capillary refills: < 3 seconds  The right DP pulse is 1+. The right PT pulse is 1+.     Left Foot/Ankle  Left Foot Inspection  Skin Exam: skin normal and skin intact. No dry skin, no warmth, no erythema, no maceration, normal color, no pre-ulcer, no ulcer and no callus.     Toe Exam: ROM and strength within normal limits. No swelling, no tenderness, no erythema and no left toe deformity.     Sensory   Vibration:  intact  Proprioception: intact  Monofilament testing: intact    Vascular  Capillary refills: < 3 seconds  The left DP pulse is 1+. The left PT pulse is 1+.     Assign Risk Category  No deformity present  No loss of protective sensation  No weak pulses  Risk: 0     Falls Plan of Care: Balance, strength, and gait training instructions were provided.     DAVID Jones

## 2024-05-07 ENCOUNTER — OFFICE VISIT (OUTPATIENT)
Dept: PHYSICAL THERAPY | Facility: CLINIC | Age: 79
End: 2024-05-07
Payer: MEDICARE

## 2024-05-07 DIAGNOSIS — M25.561 ACUTE PAIN OF RIGHT KNEE: ICD-10-CM

## 2024-05-07 DIAGNOSIS — S80.01XA CONTUSION OF RIGHT KNEE, INITIAL ENCOUNTER: Primary | ICD-10-CM

## 2024-05-07 PROCEDURE — 97112 NEUROMUSCULAR REEDUCATION: CPT

## 2024-05-07 PROCEDURE — 97110 THERAPEUTIC EXERCISES: CPT

## 2024-05-07 NOTE — PROGRESS NOTES
"Daily Note     Today's date: 2024  Patient name: Meka See  : 1945  MRN: [id number]  Referring provider: José Antonio Murillo PA-C  Dx:   Encounter Diagnosis     ICD-10-CM    1. Contusion of right knee, initial encounter  S80.01XA       2. Acute pain of right knee  M25.561           Start Time: 1100  Stop Time: 1145  Total time in clinic (min): 45 minutes    Subjective: Pt  had a busy weekend and is reporting mild soreness in R knee. Otherwise, no new complaints.      Objective: See treatment diary below      Assessment: Notable tightness in R quad with increased muscle guarding to supine quad stretch. Reviewed standing quad stretch to perform with HEP. Pt continues to make progressions in R knee strength, but varied intensity in today's session due to patient's subjective report of muscle fatigue. Plan to assess soreness at next visit and plan to incorporate more single leg exercises in next session to maximize quad strength and promote function with daily activities.     Plan: Continue per plan of care.      Insurance:  A/CMS Eval/ Re-eval POC expires FOTO Auth #/ Referral # Total units  Start date  Expiration date Extension  Visit limitation?  PT only or  PT+OT? Co-Insurance   MCR: CMS 3/27/24 5/22/24                                                                        Date [id number]   Visit Number [phone] 10 - re-eval 11 12   Manual Foto        R knee LAD w/ oscillation         Patellar mobs  All directions 5x30           Tibial IR/ER oscillation 3x30   Tibial IR/ER oscillation 3x30            Neuro Re-Ed         Bridges 3x10 3x10  3x10  3 x10 3x10   Clamshells         Sit to stand Squats with UE support 2x10 Squats with UE support: 2x10   Squats 3x10  Squats 3x10   Lateral stepping  Hurdles (2)  2x10   Lateral stepping: 15 feet, 4 laps  Hurdles 4\"   4 laps   Step ups 4\" 2x10 on right 2\" 3x10 on right   Lateral step downs: 4\" 2x10 each Lateral step down 6\": " "2x10 ea 6\" 2x10    Reverse clamshell         Exaggerated walking         Backward walking    Backward walking: 15 feet, 4 laps Backward walking/ high knee fwd 15    Quad set with hand over patella   5\" x10                TherEx         Heel slides Repeated knee extension x10  Repeated knee extension x10, with ER 2x10      Ankle pumps    HR off 4\" step: 3x10 HR off 4\" step: 3x10 HR 3x10   Quad set, SLR AROM 3x10 AROM, 3x5 AROM 3x10 Seated march: 3# 3x10 on right Seated march: 3# 3x10 on right    Knee ext: SAQ, LAQ  LAQ: 3x10  LAQ: 3#, 3x10 on right LAQ: 3#, 3x10 on right LAQ: 3# 3x10 on right    NuStep 5 min, level 1, seat 9        Rec Bike   TWU 5 min lvl`   TWU 5 min lvl 1 TWU 5 min lvl 1   Leg press  95# 2x8        Stand hip abd 2x10 each 2x10 2x10 each 3x10 each 3x10 ea 3x10 ea   Stand hip ext 2x10 each 2x10 2x10 each 3x10 each 3x10 ea 3x10 ea   Hip flexor stretching   Manual prone quad stretch on right: 3x30\"    Standing hip flexor stretch: 10\" x5 each Standing hip flexor stretch: 10\" x5 each Supine hip flexor stretch   3x 30\" ea             TherAct         Patient education   Pt edu, PT POC, HEP 10 min Re-eval, PT POC 10 min                                         Gait Training                                    Modalities         CP               Precautions:   Past Medical History:   Diagnosis Date    Coronary artery disease     pt has 6 stents total-2012 x1, 12-x3, 1/9/13 x2    Diabetes mellitus (HCC)     GERD (gastroesophageal reflux disease)     High cholesterol     Hypertension     Myocardial infarction (HCC) 03/2012    x1 stent    Shortness of breath     with exertion                                 Daily Note     Today's date: 2024  Patient name: Meka See  : 1945  MRN: [id number]  Referring provider: José Antonio Murillo PA-C  Dx:   Encounter Diagnosis     ICD-10-CM    1. Contusion of right knee, initial encounter  S80.01XA       2. Acute pain of right knee  M25.561     " "        Start Time: 1100  Stop Time: 1145  Total time in clinic (min): 45 minutes    Subjective: Pt states \"her knee will tell her when it is bothering her\"      Objective: See treatment diary below      Assessment: Tolerated treatment well. Patient  demonstrated improved quad control on eccentric lateral step down with the increased step height of 6 inches. Pt noted slight pain in superior aspect of patella, which was relieved with quad stretch in supine. Pt was advised to continue stretches with onset of pain/soreness in knee. Pt would benefit from continued PT to progress and maximize quad control in R knee.       Plan: Continue per plan of care.      Insurance:  A/CMS Eval/ Re-eval POC expires FOTO Auth #/ Referral # Total units  Start date  Expiration date Extension  Visit limitation?  PT only or  PT+OT? Co-Insurance   MCR: CMS 3/27/24 5/22/24                                                                        Date 4/18/24 4/22/24 4/24/24 4/30/24 5/2/2024 5/7/24   Visit Number [phone] 10 - re-eval 11 12   Manual Foto        R knee LAD w/ oscillation         Patellar mobs  All directions 5x30           Tibial IR/ER oscillation 3x30               Neuro Re-Ed         Bridges 3x10 3x10  3x10  3 x10    Clamshells         Sit to stand Squats with UE support 2x10 Squats with UE support: 2x10   Squats 3x10     Lateral stepping  Hurdles (2)  2x10   Lateral stepping: 15 feet, 4 laps     Step ups 4\" 2x10 on right 2\" 3x10 on right   Lateral step downs: 4\" 2x10 each Lateral step down 6\": 2x10 ea    Reverse clamshell         Exaggerated walking         Backward walking    Backward walking: 15 feet, 4 laps Backward walking/ high knee fwd 15    Quad set with hand over patella   5\" x10                TherEx         Heel slides Repeated knee extension x10  Repeated knee extension x10, with ER 2x10      Ankle pumps    HR off 4\" step: 3x10 HR off 4\" step: 3x10    Quad set, SLR AROM 3x10 AROM, 3x5 AROM 3x10 Seated march: 3# 3x10 " "on right Seated march: 3# 3x10 on right    Knee ext: SAQ, LAQ  LAQ: 3x10  LAQ: 3#, 3x10 on right LAQ: 3#, 3x10 on right    NuStep 5 min, level 1, seat 9        Rec Bike   TWU 5 min lvl`   TWU 5 min lvl 1    Leg press  95# 2x8        Stand hip abd 2x10 each 2x10 2x10 each 3x10 each 3x10 ea    Stand hip ext 2x10 each 2x10 2x10 each 3x10 each 3x10 ea    Hip flexor stretching   Manual prone quad stretch on right: 3x30\"    Standing hip flexor stretch: 10\" x5 each Standing hip flexor stretch: 10\" x5 each Supine hip flexor stretch   3x 30\" ea             TherAct         Patient education   Pt edu, PT POC, HEP 10 min Re-eval, PT POC 10 min                                         Gait Training                                    Modalities         CP               Precautions:   Past Medical History:   Diagnosis Date    Coronary artery disease     pt has 6 stents total-2012 x1, 5/29/12-x3, 1/9/13 x2    Diabetes mellitus (HCC)     GERD (gastroesophageal reflux disease)     High cholesterol     Hypertension     Myocardial infarction (HCC) 03/2012    x1 stent    Shortness of breath     with exertion                                 "

## 2024-05-08 DIAGNOSIS — I25.10 CORONARY ARTERY DISEASE INVOLVING NATIVE CORONARY ARTERY OF NATIVE HEART WITHOUT ANGINA PECTORIS: ICD-10-CM

## 2024-05-08 LAB
ALBUMIN SERPL BCP-MCNC: 4.2 G/DL (ref 3.5–5)
ALP SERPL-CCNC: 69 U/L (ref 34–104)
ALT SERPL W P-5'-P-CCNC: 24 U/L (ref 7–52)
ANION GAP SERPL CALCULATED.3IONS-SCNC: 8 MMOL/L (ref 4–13)
AST SERPL W P-5'-P-CCNC: 16 U/L (ref 13–39)
BILIRUB SERPL-MCNC: 0.72 MG/DL (ref 0.2–1)
BUN SERPL-MCNC: 18 MG/DL (ref 5–25)
CALCIUM SERPL-MCNC: 9.4 MG/DL (ref 8.4–10.2)
CHLORIDE SERPL-SCNC: 102 MMOL/L (ref 96–108)
CO2 SERPL-SCNC: 30 MMOL/L (ref 21–32)
CREAT SERPL-MCNC: 0.76 MG/DL (ref 0.6–1.3)
GFR SERPL CREATININE-BSD FRML MDRD: 76 ML/MIN/1.73SQ M
GLUCOSE P FAST SERPL-MCNC: 159 MG/DL (ref 65–99)
POTASSIUM SERPL-SCNC: 4.1 MMOL/L (ref 3.5–5.3)
PROT SERPL-MCNC: 7.1 G/DL (ref 6.4–8.4)
SODIUM SERPL-SCNC: 140 MMOL/L (ref 135–147)

## 2024-05-08 RX ORDER — ATORVASTATIN CALCIUM 80 MG/1
80 TABLET, FILM COATED ORAL DAILY
Qty: 90 TABLET | Refills: 1 | Status: SHIPPED | OUTPATIENT
Start: 2024-05-08 | End: 2025-05-03

## 2024-05-08 RX ORDER — RAMIPRIL 10 MG/1
10 CAPSULE ORAL
Qty: 90 CAPSULE | Refills: 1 | Status: SHIPPED | OUTPATIENT
Start: 2024-05-08 | End: 2025-05-03

## 2024-05-09 ENCOUNTER — OFFICE VISIT (OUTPATIENT)
Dept: PHYSICAL THERAPY | Facility: CLINIC | Age: 79
End: 2024-05-09
Payer: MEDICARE

## 2024-05-09 DIAGNOSIS — S80.01XA CONTUSION OF RIGHT KNEE, INITIAL ENCOUNTER: Primary | ICD-10-CM

## 2024-05-09 DIAGNOSIS — M25.561 ACUTE PAIN OF RIGHT KNEE: ICD-10-CM

## 2024-05-09 PROCEDURE — 97112 NEUROMUSCULAR REEDUCATION: CPT

## 2024-05-09 PROCEDURE — 97530 THERAPEUTIC ACTIVITIES: CPT

## 2024-05-09 PROCEDURE — 97110 THERAPEUTIC EXERCISES: CPT

## 2024-05-09 NOTE — PROGRESS NOTES
"Daily Note     Today's date: 2024  Patient name: Meka See  : 1945  MRN: [id number]  Referring provider: José Antonio Murillo PA-C  Dx:   Encounter Diagnosis     ICD-10-CM    1. Contusion of right knee, initial encounter  S80.01XA       2. Acute pain of right knee  M25.561           Start Time: 1015  Stop Time: 1055  Total time in clinic (min): 40 minutes    Subjective: Patient reports experiencing muscular soreness following previous session.      Objective: See treatment diary below      Assessment: Tolerated treatment well. Added additional quad loading with ambulation on ramp and resisted backward walking during today's session. Patient req intermittent vc to improve step length and confidence on right. Patient w/ quick fatigue of proximal mm. Reviewed DOMS principles for 24-48 hours post session, pt verbalized good understanding/agreement. Patient progressing well towards previously set goals. Patient will continue to benefit from skilled PT to improve functional mobility and activity tolerance.      Plan: Continue per plan of care.      Insurance:  [place]/CMS Eval/ Re-eval POC expires FOTO Auth #/ Referral # Total units  Start date  Expiration date Extension  Visit limitation?  PT only or  PT+OT? Co-Insurance   MCR: CMS 3/27/24 5/22/24                                                                        Date 2424 24   Visit Number [phone] 10 - re-eval 11 12 13   Manual         R knee LAD w/ oscillation         Patellar mobs All directions 5x30           Tibial IR/ER oscillation 3x30   Tibial IR/ER oscillation 3x30             Neuro Re-Ed         Bridges 3x10  3x10  3 x10 3x10    Clamshells         Sit to stand Squats with UE support: 2x10   Squats 3x10  Squats 3x10 TRX squats: 2x10   Lateral stepping Hurdles (2)  2x10   Lateral stepping: 15 feet, 4 laps  Hurdles 4\"   4 laps    Step ups 2\" 3x10 on right   Lateral step downs: 4\" 2x10 each Lateral step down 6\": " "2x10 ea 6\" 2x10     Reverse clamshell         Exaggerated walking         Backward walking   Backward walking: 15 feet, 4 laps Backward walking/ high knee fwd 15  Forward/backward walking at cc w/ row: 7.5# x15   Quad set with hand over patella  5\" x10                 TherEx         Heel slides  Repeated knee extension x10, with ER 2x10       Ankle pumps   HR off 4\" step: 3x10 HR off 4\" step: 3x10 HR 3x10 HR: 3x10   Quad set, SLR AROM, 3x5 AROM 3x10 Seated march: 3# 3x10 on right Seated march: 3# 3x10 on right     Knee ext: SAQ, LAQ LAQ: 3x10  LAQ: 3#, 3x10 on right LAQ: 3#, 3x10 on right LAQ: 3# 3x10 on right     NuStep         Rec Bike  TWU 5 min lvl`   TWU 5 min lvl 1 TWU 5 min lvl 1 Rec bike 5 min, level 3   Leg press 95# 2x8      75# 3x10   Stand hip abd 2x10 2x10 each 3x10 each 3x10 ea 3x10 ea    Stand hip ext 2x10 2x10 each 3x10 each 3x10 ea 3x10 ea    Hip flexor stretching  Manual prone quad stretch on right: 3x30\"    Standing hip flexor stretch: 10\" x5 each Standing hip flexor stretch: 10\" x5 each Supine hip flexor stretch   3x 30\" ea              TherAct         Patient education  Pt edu, PT POC, HEP 10 min Re-eval, PT POC 10 min                                          Gait Training               Ramp negotiation, fwd/bkwd x15 laps                     Modalities         CP               Precautions:   Past Medical History:   Diagnosis Date    Coronary artery disease     pt has 6 stents total-2012 x1, 5/29/12-x3, 1/9/13 x2    Diabetes mellitus (HCC)     GERD (gastroesophageal reflux disease)     High cholesterol     Hypertension     Myocardial infarction (HCC) 03/2012    x1 stent    Shortness of breath     with exertion                                     "

## 2024-05-14 ENCOUNTER — OFFICE VISIT (OUTPATIENT)
Dept: PHYSICAL THERAPY | Facility: CLINIC | Age: 79
End: 2024-05-14
Payer: MEDICARE

## 2024-05-14 DIAGNOSIS — S80.01XA CONTUSION OF RIGHT KNEE, INITIAL ENCOUNTER: Primary | ICD-10-CM

## 2024-05-14 DIAGNOSIS — M25.561 ACUTE PAIN OF RIGHT KNEE: ICD-10-CM

## 2024-05-14 PROCEDURE — 97112 NEUROMUSCULAR REEDUCATION: CPT

## 2024-05-14 PROCEDURE — 97110 THERAPEUTIC EXERCISES: CPT

## 2024-05-14 NOTE — PROGRESS NOTES
"Daily Note     Today's date: 5/15/2024  Patient name: Meka See  : 1945  MRN: [id number]  Referring provider: José Antonio Murillo PA-C  Dx:   Encounter Diagnosis     ICD-10-CM    1. Contusion of right knee, initial encounter  S80.01XA       2. Acute pain of right knee  M25.561             Start Time: 1015  Stop Time: 1055  Total time in clinic (min): 40 minutes  Patient treated by CARMELA Gilmore under supervision from this PT. We discussed the case to ensure appropriate continuation and progression of care and I reviewed the documentation.        Subjective: Pt reports soreness following previous session which subsided \"when I stayed moving\". Pt states feeling better today.      Objective: See treatment diary below      Assessment: Tolerated treatment well. Increased weight for LAQ to 3# for quad loading and strength gains BL. Included KB carries on turf for change in terrain/proprioception while emphasizing upright posture throughout gait. Provided verbal cueing to emphasize increased knee flexion with ambulation. Pt demonstrated good technique throughout therex, fatigued following completion of tx. Patient will continue to benefit from skilled PT to improve functional mobility and muscular endurance.      Plan: Continue per plan of care. Progress to adding functional strengthening therex.     Insurance:  AMA/CMS Eval/ Re-eval POC expires FOTO Auth #/ Referral # Total units  Start date  Expiration date Extension  Visit limitation?  PT only or  PT+OT? Co-Insurance   MCR: CMS 3/27/24 5/22/24                                                                        Date 24   Visit Number 9 10 - re-eval 11 12 13 14   Manual         R knee LAD w/ oscillation         Patellar mobs          Tibial IR/ER oscillation 3x30   Tibial IR/ER oscillation 3x30  Tibial IR/ER oscillation 3x30            Neuro Re-Ed         Bridges 3x10  3 x10 3x10     Clamshells         Sit to " "stand   Squats 3x10  Squats 3x10 TRX squats: 2x10 TRX squats  2x10   Lateral stepping  Lateral stepping: 15 feet, 4 laps  Hurdles 4\"   4 laps  Lateral stepping on turf  -exaggerated stepping  4x10'   Step ups  Lateral step downs: 4\" 2x10 each Lateral step down 6\": 2x10 ea 6\" 2x10      Reverse clamshell         Exaggerated walking      KB carries 4lb BL  4x10'   Backward walking  Backward walking: 15 feet, 4 laps Backward walking/ high knee fwd 15  Forward/backward walking at cc w/ row: 7.5# x15 Forward/backward walking at cc w/ row: 7.5# x15      Quad set with hand over patella                   TherEx         Heel slides Repeated knee extension x10, with ER 2x10        Ankle pumps  HR off 4\" step: 3x10 HR off 4\" step: 3x10 HR 3x10 HR: 3x10    Quad set, SLR AROM 3x10 Seated march: 3# 3x10 on right Seated march: 3# 3x10 on right      Knee ext: SAQ, LAQ  LAQ: 3#, 3x10 on right LAQ: 3#, 3x10 on right LAQ: 3# 3x10 on right      NuStep         Rec Bike    TWU 5 min lvl 1 TWU 5 min lvl 1 Rec bike 5 min, level 3 Rec bike  5 min  Level 3   Leg press     75# 3x10 75#   Stand hip abd 2x10 each 3x10 each 3x10 ea 3x10 ea  X10    Stand hip ext 2x10 each 3x10 each 3x10 ea 3x10 ea     Hip flexor stretching Manual prone quad stretch on right: 3x30\"    Standing hip flexor stretch: 10\" x5 each Standing hip flexor stretch: 10\" x5 each Supine hip flexor stretch   3x 30\" ea               TherAct         Patient education Pt edu, PT POC, HEP 10 min Re-eval, PT POC 10 min                                           Gait Training              Ramp negotiation, fwd/bkwd x15 laps                      Modalities         CP               Precautions:   Past Medical History:   Diagnosis Date    Coronary artery disease     pt has 6 stents total-2012 x1, 5/29/12-x3, 1/9/13 x2    Diabetes mellitus (HCC)     GERD (gastroesophageal reflux disease)     High cholesterol     Hypertension     Myocardial infarction (HCC) 03/2012    x1 stent    Shortness of " breath     with exertion

## 2024-05-16 ENCOUNTER — OFFICE VISIT (OUTPATIENT)
Dept: PHYSICAL THERAPY | Facility: CLINIC | Age: 79
End: 2024-05-16
Payer: MEDICARE

## 2024-05-16 DIAGNOSIS — M25.561 ACUTE PAIN OF RIGHT KNEE: ICD-10-CM

## 2024-05-16 DIAGNOSIS — S80.01XA CONTUSION OF RIGHT KNEE, INITIAL ENCOUNTER: Primary | ICD-10-CM

## 2024-05-16 PROCEDURE — 97110 THERAPEUTIC EXERCISES: CPT

## 2024-05-16 PROCEDURE — 97112 NEUROMUSCULAR REEDUCATION: CPT

## 2024-05-16 NOTE — PROGRESS NOTES
"Daily Note     Today's date: 2024  Patient name: Meka See  : 1945  MRN: [id number]  Referring provider: José Antonio Murillo PA-C  Dx:   Encounter Diagnosis     ICD-10-CM    1. Contusion of right knee, initial encounter  S80.01XA       2. Acute pain of right knee  M25.561           Start Time: 0930  Stop Time: 1015  Total time in clinic (min): 45 minutes    Subjective: Patient provides no new complaints.      Objective: See treatment diary below      Assessment: Tolerated treatment well. Noted decreased quad control when standing on RLE during hurdles. She also had difficulty bending R knee to clear the hurdles. Included quad strengthening as well as tibial IR/ER oscillations and patellar mobs to improve patellar control during standing activities. Plan to progress quad strengthening as tolerated Patient demonstrated fatigue post treatment, exhibited good technique with therapeutic exercises, and would benefit from continued PT to maximize tolerance to daily activities.       Plan: Continue per plan of care.      Insurance:  [employer]/CMS Eval/ Re-eval POC expires FOTO Auth #/ Referral # Total units  Start date  Expiration date Extension  Visit limitation?  PT only or  PT+OT? Co-Insurance   MCR: CMS 3/27/24 5/22/24                                                                        Date 24   Visit Number 10 - re-eval 11 12 13 14 15   Manual         R knee LAD w/ oscillation         Patellar mobs      Perf 3 min      Tibial IR/ER oscillation 3x30  Tibial IR/ER oscillation 3x30 Tibial IR/ER oscillation 3x30            Neuro Re-Ed         Bridges  3 x10 3x10      Clamshells         Sit to stand  Squats 3x10  Squats 3x10 TRX squats: 2x10 TRX squats  2x10 2x10   Lateral stepping Lateral stepping: 15 feet, 4 laps  Hurdles 4\"   4 laps  Lateral stepping on turf  -exaggerated stepping  4x10' Hurdles 6\" 4 laps   Step ups Lateral step downs: 4\" 2x10 each Lateral step " "down 6\": 2x10 ea 6\" 2x10       Reverse clamshell         Exaggerated walking     KB carries 4lb BL  4x10'    Backward walking Backward walking: 15 feet, 4 laps Backward walking/ high knee fwd 15  Forward/backward walking at cc w/ row: 7.5# x15 Forward/backward walking at cc w/ row: 7.5# x15    Forward/backward walking at cc w/ row: 7.5# x15   Quad set with hand over patella                   TherEx         Heel slides         Ankle pumps HR off 4\" step: 3x10 HR off 4\" step: 3x10 HR 3x10 HR: 3x10  HR 3x10   Quad set, SLR Seated march: 3# 3x10 on right Seated march: 3# 3x10 on right       Knee ext: SAQ, LAQ LAQ: 3#, 3x10 on right LAQ: 3#, 3x10 on right LAQ: 3# 3x10 on right    SAQ 3# 3x10 on right   LAQ 3x10   NuStep         Rec Bike   TWU 5 min lvl 1 TWU 5 min lvl 1 Rec bike 5 min, level 3 Rec bike  5 min  Level 3 Rec bike  5 min  Level 3   Leg press    75# 3x10 75# 75# 3x10   Stand hip abd 3x10 each 3x10 ea 3x10 ea  X10     Stand hip ext 3x10 each 3x10 ea 3x10 ea      Hip flexor stretching Standing hip flexor stretch: 10\" x5 each Supine hip flexor stretch   3x 30\" ea                TherAct         Patient education Re-eval, PT POC 10 min                                            Gait Training             Ramp negotiation, fwd/bkwd x15 laps                       Modalities         CP               Precautions:   Past Medical History:   Diagnosis Date    Coronary artery disease     pt has 6 stents total-2012 x1, 5/29/12-x3, 1/9/13 x2    Diabetes mellitus (HCC)     GERD (gastroesophageal reflux disease)     High cholesterol     Hypertension     Myocardial infarction (HCC) 03/2012    x1 stent    Shortness of breath     with exertion                                       "

## 2024-05-20 ENCOUNTER — OFFICE VISIT (OUTPATIENT)
Dept: PHYSICAL THERAPY | Facility: CLINIC | Age: 79
End: 2024-05-20
Payer: MEDICARE

## 2024-05-20 DIAGNOSIS — M25.561 ACUTE PAIN OF RIGHT KNEE: ICD-10-CM

## 2024-05-20 DIAGNOSIS — S80.01XA CONTUSION OF RIGHT KNEE, INITIAL ENCOUNTER: Primary | ICD-10-CM

## 2024-05-20 PROCEDURE — 97112 NEUROMUSCULAR REEDUCATION: CPT

## 2024-05-20 PROCEDURE — 97110 THERAPEUTIC EXERCISES: CPT

## 2024-05-20 NOTE — PROGRESS NOTES
"Daily Note     Today's date: 2024  Patient name: Meka See  : 1945  MRN: [id number]  Referring provider: José Antonio Murillo PA-C  Dx:   Encounter Diagnosis     ICD-10-CM    1. Contusion of right knee, initial encounter  S80.01XA       2. Acute pain of right knee  M25.561           Start Time: 1145  Stop Time: 1225  Total time in clinic (min): 40 minutes    Subjective: \"I don't get pain anymore but it occasionally gets sore.\"      Objective: See treatment diary below      Assessment: Tolerated treatment well. Continued with quad loading in functional patterns during today's session with quick fatigue notes. Patient provided with graded verbal cues during initiation of lunges to promote technique and mm isolation. Pt able to maintain neutral pelvis with lateral step downs. Reviewed DOMS for principles for 24-48 hours post session, pt verbalized good understanding/agreement. Patient progressing well towards previously set goals. Patient will continue to benefit from skilled PT to improve functional mobility and muscular power.      Plan: Continue per plan of care.      Insurance:  [place]/CMS Eval/ Re-eval POC expires FOTO Auth #/ Referral # Total units  Start date  Expiration date Extension  Visit limitation?  PT only or  PT+OT? Co-Insurance   MCR: CMS 3/27/24 5/22/24                                                                        Date [id number] 24   Visit Number 11 12 13 14 15 16   Manual         R knee LAD w/ oscillation         Patellar mobs     Perf 3 min      Tibial IR/ER oscillation 3x30  Tibial IR/ER oscillation 3x30 Tibial IR/ER oscillation 3x30             Neuro Re-Ed         Bridges 3 x10 3x10       Clamshells         Sit to stand Squats 3x10  Squats 3x10 TRX squats: 2x10 TRX squats  2x10 2x10 TRX squats: 3x6   Lateral stepping  Hurdles 4\"   4 laps  Lateral stepping on turf  -exaggerated stepping  4x10' Hurdles 6\" 4 laps Lateral stepping: 10 feet, 8 " "laps   Step ups Lateral step down 6\": 2x10 ea 6\" 2x10     Lateral step downs: 4\" 2x10 on right   Reverse clamshell      TRX lunges: 3x6 bilat   Exaggerated walking    KB carries 4lb BL  4x10'     Backward walking Backward walking/ high knee fwd 15  Forward/backward walking at cc w/ row: 7.5# x15 Forward/backward walking at cc w/ row: 7.5# x15    Forward/backward walking at cc w/ row: 7.5# x15    Quad set with hand over patella                   TherEx         Heel slides         Ankle pumps HR off 4\" step: 3x10 HR 3x10 HR: 3x10  HR 3x10 HR 3x10   Quad set, SLR Seated march: 3# 3x10 on right        Knee ext: SAQ, LAQ LAQ: 3#, 3x10 on right LAQ: 3# 3x10 on right    SAQ 3# 3x10 on right   LAQ 3x10    NuStep         Rec Bike  TWU 5 min lvl 1 TWU 5 min lvl 1 Rec bike 5 min, level 3 Rec bike  5 min  Level 3 Rec bike  5 min  Level 3 Rec bike, 5 min, level 3   Leg press   75# 3x10 75# 75# 3x10 85# 3x15   Stand hip abd 3x10 ea 3x10 ea  X10      Stand hip ext 3x10 ea 3x10 ea       Hip flexor stretching Supine hip flexor stretch   3x 30\" ea                 TherAct         Patient education                                             Gait Training            Ramp negotiation, fwd/bkwd x15 laps                        Modalities         CP               Precautions:   Past Medical History:   Diagnosis Date    Coronary artery disease     pt has 6 stents total-2012 x1, 5/29/12-x3, 1/9/13 x2    Diabetes mellitus (HCC)     GERD (gastroesophageal reflux disease)     High cholesterol     Hypertension     Myocardial infarction (HCC) 03/2012    x1 stent    Shortness of breath     with exertion                                         "

## 2024-05-28 ENCOUNTER — OFFICE VISIT (OUTPATIENT)
Dept: PHYSICAL THERAPY | Facility: CLINIC | Age: 79
End: 2024-05-28
Payer: MEDICARE

## 2024-05-28 DIAGNOSIS — M25.561 ACUTE PAIN OF RIGHT KNEE: ICD-10-CM

## 2024-05-28 DIAGNOSIS — S80.01XA CONTUSION OF RIGHT KNEE, INITIAL ENCOUNTER: Primary | ICD-10-CM

## 2024-05-28 PROCEDURE — 97112 NEUROMUSCULAR REEDUCATION: CPT

## 2024-05-28 PROCEDURE — 97110 THERAPEUTIC EXERCISES: CPT

## 2024-05-28 NOTE — PROGRESS NOTES
"Daily Note     Today's date: 2024  Patient name: Meka See  : 1945  MRN: [id number]  Referring provider: José Antonio Murillo PA-C  Dx:   Encounter Diagnosis     ICD-10-CM    1. Contusion of right knee, initial encounter  S80.01XA       2. Acute pain of right knee  M25.561           Start Time: 1015  Stop Time: 1055  Total time in clinic (min): 40 minutes    Subjective: Patient states she is pleased with her progress overall. States main deficits is fatigue with right quad with sustained activity.      Objective: See treatment diary below      Assessment: Tolerated treatment well. Patient demo inc lateral sway to right with farmers carry. Cont to promote quad loading with standing progressions, including ramp negotiation and emphasis of eccentric phase with step ups. Patient provided with graded vc during lunges to promote technique and tolerance. Patient progressing well towards previously set goals. Patient will continue to benefit from skilled PT to improve functional mobility and activity tolerance.      Plan: Continue per plan of care.  D/C nv pending progress.     Insurance:  A/CMS Eval/ Re-eval POC expires FOTO Auth #/ Referral # Total units  Start date  Expiration date Extension  Visit limitation?  PT only or  PT+OT? Co-Insurance   MCR: CMS 3/27/24 5/22/24                                                                        Date 24   Visit Number 12 13 14 15 16 17   Manual         R knee LAD w/ oscillation         Patellar mobs    Perf 3 min      Tibial IR/ER oscillation 3x30  Tibial IR/ER oscillation 3x30 Tibial IR/ER oscillation 3x30              Neuro Re-Ed         Bridges 3x10        Clamshells         Sit to stand Squats 3x10 TRX squats: 2x10 TRX squats  2x10 2x10 TRX squats: 3x6    Lateral stepping Hurdles 4\"   4 laps  Lateral stepping on turf  -exaggerated stepping  4x10' Hurdles 6\" 4 laps Lateral stepping: 10 feet, 8 laps    Step ups 6\" " "2x10     Lateral step downs: 4\" 2x10 on right Step ups w/ emphasis on eccentric control: 6\" 3x10 on right   Reverse clamshell     TRX lunges: 3x6 bilat Lunges w/ B UE support: 2x10 bilat   Exaggerated walking   KB carries 4lb BL  4x10'   [place] carry w/ 10# tidal tank x300 ft total   Backward walking  Forward/backward walking at cc w/ row: 7.5# x15 Forward/backward walking at cc w/ row: 7.5# x15    Forward/backward walking at cc w/ row: 7.5# x15     Quad set with hand over patella                   TherEx         Heel slides         Ankle pumps HR 3x10 HR: 3x10  HR 3x10 HR 3x10    Quad set, SLR         Knee ext: SAQ, LAQ LAQ: 3# 3x10 on right    SAQ 3# 3x10 on right   LAQ 3x10  LAQ: 3x10    Seated march: 3x10   NuStep         Rec Bike  TWU 5 min lvl 1 Rec bike 5 min, level 3 Rec bike  5 min  Level 3 Rec bike  5 min  Level 3 Rec bike, 5 min, level 3    Leg press  75# 3x10 75# 75# 3x10 85# 3x15 85# 3x15   Stand hip abd 3x10 ea  X10    3x10 bilat   Stand hip ext 3x10 ea     3x10 bilat   Hip flexor stretching                  TherAct         Patient education                                             Gait Training           Ramp negotiation, fwd/bkwd x15 laps    Ramp negotiation, fwd/bkwd x15 laps                     Modalities         CP               Precautions:   Past Medical History:   Diagnosis Date    Coronary artery disease     pt has 6 stents total-2012 x1, 5/29/12-x3, 1/9/13 x2    Diabetes mellitus (HCC)     GERD (gastroesophageal reflux disease)     High cholesterol     Hypertension     Myocardial infarction (HCC) 03/2012    x1 stent    Shortness of breath     with exertion                                           "

## 2024-06-04 ENCOUNTER — OFFICE VISIT (OUTPATIENT)
Dept: PHYSICAL THERAPY | Facility: CLINIC | Age: 79
End: 2024-06-04
Payer: MEDICARE

## 2024-06-04 DIAGNOSIS — M25.561 ACUTE PAIN OF RIGHT KNEE: ICD-10-CM

## 2024-06-04 DIAGNOSIS — S80.01XA CONTUSION OF RIGHT KNEE, INITIAL ENCOUNTER: Primary | ICD-10-CM

## 2024-06-04 PROCEDURE — 97110 THERAPEUTIC EXERCISES: CPT

## 2024-06-04 NOTE — PROGRESS NOTES
Discharge Note     Today's date: 2024  Patient name: Meka See  : 1945  MRN: [id number]  Referring provider: José Antonio Murillo PA-C  Dx:   Encounter Diagnosis     ICD-10-CM    1. Contusion of right knee, initial encounter  S80.01XA       2. Acute pain of right knee  M25.561           Start Time: 1100  Stop Time: 1140  Total time in clinic (min): 40 minutes    Subjective: Patient reports overall improvement in knee pain and function since starting physical therapy. States she is able to perform all ADLs and functional activities without restriction, although mild increased fatigability noted on right compared to left. States she will be starting cardiac rehab next week. States she is ready for discharge in favor of independent HEP at this time.    Goals  Short Term Goals (4 weeks):   (24: all met)  - Patient will be independent in basic HEP 2-3 weeks  - Patient will report >50% reduction in pain  - Patient will demonstrate >1/3 improvement in MMT grade as applicable  - Patient will demo equal, pain free, knee ROM bilaterally    Long Term Goals (8 weeks): (24: all met)  - Patient will be independent in a comprehensive home exercise program  - Patient FOTO score will improve to >65/100  - Patient will self-report >75% improvement in function  - Patient will perform STS transfers with min use of hands  - Patient will negotiate stairs with reciprocal pattern      Objective: See treatment diary below    Sensation  - Light touch: diminished sensation noted L4 dermatome on right, all others grossly equal (24: grossly equal) (24: WNL)    LE MMT  LEFT  RIGHT  -Hip Flexion:   4/5  3+/5 (24: 4-/5) (24: 4/5)  -Hip Abduction: 3+/5  3+/5 (24: 4-/5) (24: 4/5)  -Hip Adduction: 4/5  4/5 (24: 4/5) (24: 4+/5)  -Hip IR:  4+/5  4/5 (24: 4/5) (24: 4+/5)  -Hip ER:  4+/5  4/5 (24: 4/5) (24: 4+/5)    -Knee Flexion  4+/5  4+/5 (24: 4+/5) (24: 4+/5)  -Knee  Extension 4+/5  4/5 (4/30/24: 4/5) (6/4/24: 4+/5)    -Ankle DF  5/5  5/5 (4/30/24: 5/5) (6/4/24: 5/5)  -Ankle PF  5/5  5/5 (4/30/24: 5/5) (6/4/24: 5/5)    Knee Range of Motion    LEFT  RIGHT  - Flexion 130  125 (4/30/24: 130) (6/4/24: 130)  - Extension 0  0 (4/30/24: 0)  (6/4/24: 0)    Palpation  - Positive: lateral joint line, patellar tendon, quad tendon, superior tip of patella (4/30/24: mild TTP noted at quad tendon) (6/4/24: negative for TTP)    Mobility Assessment  Mild restriction with patellar mobility on right  Fib head WNL  Tibial IR/ER WNL   (4/30/24: WNL)   (6/4/24: WNL)    Functional Assessment  -Functional Squat: achieves 50% of motion with anterior knee pain (4/30/24: achieves 50% of motion with minimal knee pain) (6/4/24: achieves 50% of motion without pain)  -Gait Assessment: antalgic gait with decreased knee flexion on right (4/30/24: grossly similar) (6/4/24: WNL)  -Patient with 1+ pitting edema bilaterally   (4/30/24: grossly similar) (6/4/24: grossly similar)          Assessment: Patient has made gains in strength, range of motion, and functional mobility since starting physical therapy. She has demonstrated normalized gait sequencing, improved tolerance to stair negotiation, as well as improved technique with stair negotiation. As the patient has met all previously set goals, they are being discharged from physical therapy episode in favor of independent home exercise program. HEP was updated and reviewed during today's session and patient expressed no questions/concerns with discharge.        Plan: Discharge from PT episode in favor of independent HEP.     Insurance:  AMA/CMS Eval/ Re-eval POC expires FOTO Auth #/ Referral # Total units  Start date  Expiration date Extension  Visit limitation?  PT only or  PT+OT? Co-Insurance   MCR: CMS 3/27/24 5/22/24                                                                        Date 5/9/24 5/14/22 5/16/24 5/20/24 5/28/24 6/4/24   Visit Number 13 14  "15 16 17 18   Manual         R knee LAD w/ oscillation         Patellar mobs   Perf 3 min        Tibial IR/ER oscillation 3x30 Tibial IR/ER oscillation 3x30               Neuro Re-Ed         Bridges      Bridges: 3x10   Clamshells      Clamshells: 2x10 bilat    Reverse clamshells: 2x10 bilat   Sit to stand TRX squats: 2x10 TRX squats  2x10 2x10 TRX squats: 3x6  Squats 2x10   Lateral stepping  Lateral stepping on turf  -exaggerated stepping  4x10' Hurdles 6\" 4 laps Lateral stepping: 10 feet, 8 laps     Step ups    Lateral step downs: 4\" 2x10 on right Step ups w/ emphasis on eccentric control: 6\" 3x10 on right    Reverse clamshell    TRX lunges: 3x6 bilat Lunges w/ B UE support: 2x10 bilat    Exaggerated walking  KB carries 4lb BL  4x10'   [place] carry w/ 10# tidal tank x300 ft total    Backward walking Forward/backward walking at cc w/ row: 7.5# x15 Forward/backward walking at cc w/ row: 7.5# x15    Forward/backward walking at cc w/ row: 7.5# x15      Quad set with hand over patella                   TherEx         Heel slides         Ankle pumps HR: 3x10  HR 3x10 HR 3x10  HR: 3x10   Quad set, SLR      SLR: 3x10   Knee ext: SAQ, LAQ   SAQ 3# 3x10 on right   LAQ 3x10  LAQ: 3x10    Seated march: 3x10 LAQ: 3x10   NuStep         Rec Bike  Rec bike 5 min, level 3 Rec bike  5 min  Level 3 Rec bike  5 min  Level 3 Rec bike, 5 min, level 3     Leg press 75# 3x10 75# 75# 3x10 85# 3x15 85# 3x15    Stand hip abd  X10    3x10 bilat Stand hip abd: 3x10 bilat   Stand hip ext     3x10 bilat Stand hip ext: 3x10 bilat   Hip flexor stretching                  TherAct         Patient education                                             Gait Training          Ramp negotiation, fwd/bkwd x15 laps    Ramp negotiation, fwd/bkwd x15 laps                      Modalities         CP               Precautions:   Past Medical History:   Diagnosis Date    Coronary artery disease     pt has 6 stents total-2012 x1, 5/29/12-x3, 1/9/13 x2    " Diabetes mellitus (HCC)     GERD (gastroesophageal reflux disease)     High cholesterol     Hypertension     Myocardial infarction (HCC) 03/2012    x1 stent    Shortness of breath     with exertion

## 2024-06-11 DIAGNOSIS — I10 PRIMARY HYPERTENSION: ICD-10-CM

## 2024-06-11 DIAGNOSIS — I25.10 CORONARY ARTERY DISEASE INVOLVING NATIVE CORONARY ARTERY OF NATIVE HEART WITHOUT ANGINA PECTORIS: ICD-10-CM

## 2024-06-12 ENCOUNTER — TELEPHONE (OUTPATIENT)
Dept: CARDIAC REHAB | Facility: CLINIC | Age: 79
End: 2024-06-12

## 2024-06-12 RX ORDER — CARVEDILOL 12.5 MG/1
12.5 TABLET ORAL 2 TIMES DAILY WITH MEALS
Qty: 180 TABLET | Refills: 0 | Status: SHIPPED | OUTPATIENT
Start: 2024-06-12 | End: 2025-06-07

## 2024-06-12 RX ORDER — AMLODIPINE BESYLATE 5 MG/1
5 TABLET ORAL DAILY
Qty: 90 TABLET | Refills: 0 | Status: SHIPPED | OUTPATIENT
Start: 2024-06-12 | End: 2025-06-07

## 2024-06-13 ENCOUNTER — CLINICAL SUPPORT (OUTPATIENT)
Dept: CARDIAC REHAB | Facility: CLINIC | Age: 79
End: 2024-06-13
Payer: MEDICARE

## 2024-06-13 DIAGNOSIS — I25.118 CORONARY ARTERY DISEASE OF NATIVE ARTERY OF NATIVE HEART WITH STABLE ANGINA PECTORIS (HCC): ICD-10-CM

## 2024-06-13 DIAGNOSIS — I20.89 CHRONIC STABLE ANGINA: ICD-10-CM

## 2024-06-13 PROCEDURE — 93797 PHYS/QHP OP CAR RHAB WO ECG: CPT

## 2024-06-13 NOTE — PROGRESS NOTES
CARDIAC REHABILITATION ASSESSMENT AND INDIVIDUALIZED TREATMENT PLAN  INITIAL           Today's date: 2024   # of Exercise Sessions Completed: Initial +1  Patient name: Meka See      : 1945  Age: 79 y.o.       MRN: [id number]  Referring Physician: Domenico Waller DO  Cardiologist: Domenico Waller DO  Provider: Wilfredo  Clinician: Naila Waller,   Please review the following patient assessment for Meka See to begin cardiac rehabilitation post I.20.89.  Please verify you agree with the outlined plan of care and exercise prescription by signing with attached order.    Thank You.      Comments: Completed initial evaluation. Explained cardiac rehabilitation, cardiac risk factors, how the heart functions, Ejection fraction, heart healthy diet, RPE scale, angina, diabetes and exercise. Completed sub max treadmill test. Telemetry monitor NSR at rest and with exercise. Exercise MET level 1.77 RPE 6. Complained of fatigue and SOBOEl. Denied any complaint of chest discomfort. Provided her with handbook for cardiac rehabilitation and low sodium recipes. Pt stated goals included:attend cardiac rehabilitation regularly, increase endurance and strength to play with grandchildren without fatigue. Walk a longer distance without fatigue and shortness of breath with minimal exertion, decrease weight with increase activity, smaller food portions and reading labels for sodium, fat and carbohydrates.         Dx:   Encounter Diagnoses   Name Primary?    Coronary artery disease of native artery of native heart with stable angina pectoris (HCC)     Chronic stable angina        Description of Diagnosis: She has shortness of breath with exertion along with some chest tightness at times. She has chronic LE Edema.     Date of onset: 3/26/2024    Other Cardiac History: she has a history of coronary artery disease with previous multivessel PCI in  and  (PCI mLAD and D1 stents - 2013 +  PCI: pRCA, mRCA, PDA - 5/29/2012 .  She denies any history of myocardial infarction.  She was feeling fatigue and weakness prior to stent placement which improved after her stents were complete.  Nuclear stress test was done which did not show any ischemia or infarction.  Ejection fraction was 80%  Echocardiogram showed ejection fraction of 60% with moderate LA dilation and mild valve disease. CAD, HTN, MI, DMII, HLD        ASSESSMENT    Medical History:   Past Medical History:   Diagnosis Date    Coronary artery disease     pt has 6 stents total-2012 x1, 5/29/12-x3, 1/9/13 x2    Diabetes mellitus (HCC)     GERD (gastroesophageal reflux disease)     High cholesterol     Hypertension     Myocardial infarction (HCC) 03/2012    x1 stent    Shortness of breath     with exertion       Family History:  Family History   Problem Relation Age of Onset    Diabetes Mother     Arthritis Mother     Endometriosis Sister     Cancer Maternal Grandmother         uterine or ovarian - not sure       Allergies:   Prasugrel    Current Medications:   Current Outpatient Medications   Medication Sig Dispense Refill    albuterol (ProAir HFA) 90 mcg/act inhaler Inhale 2 puffs every 4 (four) hours as needed for wheezing or shortness of breath (Patient not taking: Reported on 5/3/2024) 8.5 g 7    amLODIPine (NORVASC) 5 mg tablet Take 1 tablet (5 mg total) by mouth daily 90 tablet 0    ASPIRIN 81 PO Take 81 mg by mouth every morning      atorvastatin (LIPITOR) 80 mg tablet Take 1 tablet (80 mg total) by mouth daily 90 tablet 1    Calcium Carbonate (CALTRATE 600 PO) Take by mouth in the morning      carvedilol (COREG) 12.5 mg tablet Take 1 tablet (12.5 mg total) by mouth 2 (two) times a day with meals 180 tablet 0    esomeprazole (NexIUM) 40 MG capsule Take 40 mg by mouth every morning      estradiol (ESTRACE VAGINAL) 0.1 mg/g vaginal cream Apply pea size amount around the opening of the vagina daily x 14 days, followed by twice weekly. 42.5 g 1     meclizine (ANTIVERT) 12.5 MG tablet Take 1 tablet (12.5 mg total) by mouth 3 (three) times a day as needed for dizziness 30 tablet 0    metFORMIN (GLUCOPHAGE-XR) 500 mg 24 hr tablet Take 2 tablets (1,000 mg total) by mouth 2 (two) times a day with meals 360 tablet 1    ramipril (ALTACE) 10 MG capsule Take 1 capsule (10 mg total) by mouth daily at bedtime 90 capsule 1    Triprolidine-Pseudoephedrine (ANTIHISTAMINE PO) Take by mouth daily at bedtime      Vitamin D, Cholecalciferol, 25 MCG (1000 UT) TABS Take by mouth       No current facility-administered medications for this visit.       Medication compliance: Yes   Comments: Pt reports to be compliant with medications    Physical Limitations: Shortness of breath, Chronic chest discomfort, right knee discomfort    Fall Risk: Moderate   Comments: Reports a fall in the past 6 months    Cultural needs: none      CAD Risk Factors:  Cholesterol: Yes  HTN: Yes  DM: Type 2   PCP monitors A1c not required to monitor home BG  No insulin  Obesity: Yes   Inactivity: Yes      EXERCISE ASSESSMENT:     Initial Fitness Assessment: Submaximal TM ETT:  Resting:  BP: 122/70  HR: 80, Exercise:  BP: 136/78  HR: 100, METs:  1.77, ECG Summary: NSR, Symptoms: SOBOE, fatigue, and Test terminated at:  RPE 6      Functional Status Prior to Diagnosis for Treatment:   Occupation: retired  Recreation/Physical Activity: watch TV, Knitting, bike riding (3 years ago)  ADL’s: able to perform self-care  [place]: able to perform self-care  Home exercise equipment:  stationary bike and outdoor bike  Home exercise: none  Other: PT for right knee    Current Functional Status:  Occupation: retired  Recreation/Physical Activity: watch TV, sedentary  ADL’s:able to perform self-care  [place]: able to perform self-care  Home exercise: none  Other: PT for right knee    Functional Capacity Screening Tool:  [facility] Activity Status Index:  6.36 METs      PSYCHOSOCIAL ASSESSMENT:    Depression screening:  " PHQ-9 = 13    Interpretation:  10-14 = Moderate Depression  Anxiety screening:  SCOOTER-7 = 0    Interpretation: 0-4  = Not anxious    Pt self-report of depression and anxiety   Patient reports they are coping well with good social support and denies depression or anxiety  Reports sufficient emotional support   encouraged Pt to speak to PCP about their emotional health concerns  She is not interested in intervention of PHQ9 of 13.       Self-reported stress level:   0   Stressors:  denies any stress  Stress Management Tools: exercise, keep a positive mindset, and TV    Quality of Life Screen:  (Higher score indicates disease impact on QOL)  [facility] COOP score: 26/40        Social Support:   significant other  Community/Social Activities: none     Psychosocial Assessment as it relates to rehabilitation:   Patient denies issues with his/her family or home life that may affect their rehabilitation efforts.       NUTRITION ASSESSMENT:    Weight:    Wt Readings from Last 1 Encounters:   05/03/24 94.3 kg (208 lb)        Height:   Ht Readings from Last 1 Encounters:   05/03/24 5' 4.5\" (1.638 m)       Rate Your Plate Score: 53/81    Diabetes: T2D, Pt not required to monitor home blood sugar, no insulin  A1c: 7.6    last measured: 11/3/2023    Lipid management: Discussed diet and lipid management and Last lipid profile 4/20/2023  Chol 141    HDL 45  LDL 70    Current Dietary Habits:  She stated she will decrease food portions and read labels for sodium, fat and carbohydrates    Drug/Alcohol Use:   Yes, 0-2 a week       OTHER CORE COMPONENT ASSESSMENT:    Tobacco Use:     Pt quit 2012   and has abstained    Anginal Symptoms:  LOCKETT and fatigue   NTG use: No prescription        INDIVIDUALIZED TREATMENT PLAN      Patient will attend 35 monitored exercise sessions beginning 6/19/2024.    See outlined plan of care below for specific patient goals in each component of care.        EXERCISE GOAL and PLAN      SMART Goals:   10% " improvement in functional capacity based on max METs achieved in initial fitness assessment  reduced dyspnea with physical activity    improved DASI score by 10%  maintain > 150 minutes per week of moderate intensity exercise    Patient Specific EXERCISE GOALS:       attend rehab regularly, decrease sitting time, start a home exercise program, and increase endurance to play with grandchildren and walk a longer distance without fatigue and shortness of breath, return to using bikes at home    Progress toward SMART and personal Exercise goals: Patient is agreeable to attend cardiopulmonary rehab exercise sessions 3x/wk x 36 sessions.    Plan for next 30 days:    Attend cardiac rehabilitation regularly, complete 31 to 40 minutes of aerobic activity, increase time and resistance as tolerated    The patient was counseled on exercise guidelines to achieve a minimum of 150 mins/wk of moderate intensity (RPE 4-6)   exercise and encouraged to add 1-2 days of exercise on opposite days of cardiac rehab as tolerated.     Current Aerobic Exercise Prescription:      Frequency: 3 days/week   Supplement with home exercise 2+ days/wk as tolerated       Minutes: 31 - 40         METS: 1.5-3.0            HR:  100-120   RPE: 4-6         Modalities: UBE, NuStep, Recumbent bike, and Room walking     Exercise workloads will be progressed gradually as tolerated, within limits of patient's ability, and according to the patient's   response to the exercise program.      Aerobic Exercise Prescription - 30 day goal:   Frequency: 3 days/week of cardiac rehab       Supplement with home exercise 2+ days/wk as tolerated    Minutes: 31-40       >150 mins/wk of moderate intensity exercise   METS: 1.5-3.0   HR: 100-120     RPE: 4-6   Modalities: UBE, NuStep, Recumbent bike, and Room walking    Strength trainin-3 days / week  12-15 repetitions  1-2 sets per modality   Will be added following at least 8 weeks post surgery and 8-10 monitored  sessions   Modalities: Pull Downs, Arm Curl, Sit to Stands, Upright Rows, Front Raises, and Shoulder Shrugs    Home Exercise: none    Group and Individual Education: benefit of exercise for CAD risk factors, AHA guidelines to achieve >150 mins/wk of moderate exercise, and RPE scale     Readiness to change: Contemplation:  (Acknowledging that there is a problem but not yet ready or sure of wanting to make a change)      NUTRITION GOALS AND PLAN    Weight control:    Starting weight: 210   Current weight: 210    Nutritional   Reviewed patient's Rate your Plate. Discussed key elements of heart healthy eating. Reviewed patient goals for dietary modifications and their clinical implications.  Reviewed most recent lipid profile.     SMART Goals:   2.5-5%  wt loss    Patient Specific NUTRITION GOALS:     Decrease food portions, read food labels    Patient's progress toward SMART and personal Nutrition goals:   Patient is agreeable to making dietary modifications.    Plan for next 30 days:   Decrease food portions, decrease consumption of sodium, fat and carbohydrates    Measurable goals were based Rate Your Plate Dietary Self-Assessment. These are the areas in which the patient could score higher on the assessment.  Goals include recommendations for a heart healthy diet based on American Heart Association.    Group and Individual Education:   exercise and diabetes management   group class: Heart Healthy Eating  effects of exercise and diet on glycemic control    Readiness to change: Pre-Contemplation:   (Not yet acknowledging that there is a problem behavior that needs to change)      PSYCHOSOCIAL ASSESSMENT AND PLAN    Psychosocial Assessment as it relates to rehabilitation:   Patient denies issues with his/her family or home life that may affect their rehabilitation efforts.     SMART Goals:     PHQ-9 - reduced severity by one level, improved sleeping habits, and feel less tired with more energy    Patient Specific  PSYCHOCOSOCIAL GOALS:      Encourage patient to use [facility] [employer] program, she is not interested in medical intervention for PHQ9 of 13 at this time    Patient's progress toward SMART and personal Psychosocial goals:   She is not interested in medical intervention for emotional health at this time, discussed relaxation breathing, guided imagery and [employer] program    Plan for next 30 days:   Practice relaxation techniques, Exercise, Keep a positive mindset, and Repeat PHQ-9 every 30 days if score >5    Group and Individual Education: benefits of a positive support system, benefits of enrolling in [facility], depression and CAD, and benefits of mental health counseling    Information to utilize [employer] was provided as well as contact information for counseling through  [facility]al [facility] and group psychotherapy groups available.    Readiness to change: Pre-Contemplation:   (Not yet acknowledging that there is a problem behavior that needs to change)      OTHER CORE COMPONENTS GOALS and PLAN      Blood Pressure will be monitored throughout the program and cardiologist will be notified of elevated trends.    Pt will be encouraged to monitor home BP if advised by cardiologist.    Tobacco Intervention:   Pt quit 2012 and has abstained since quitting.      SMART Goals:   consistent, controlled resting BP < 130/80 and medication compliance    Patient Specific CORE COMPONENT GOALS:    medication compliance and decrease sodium in diet    Progress toward SMART and personal Core Component goals:    She will start reading food labels for sodium content, continue to take her medication as prescribed    Plan for next 30 days:   medication compliance, engage in regular exercise, and check labels for sodium content    Group and Individual Education:  understanding high blood pressure and it's relationship to CAD    Readiness to change: Preparation:  (Getting ready to change)

## 2024-06-19 ENCOUNTER — CLINICAL SUPPORT (OUTPATIENT)
Dept: CARDIAC REHAB | Facility: CLINIC | Age: 79
End: 2024-06-19
Payer: MEDICARE

## 2024-06-19 DIAGNOSIS — I20.89 CHRONIC STABLE ANGINA: Primary | ICD-10-CM

## 2024-06-19 PROCEDURE — 93798 PHYS/QHP OP CAR RHAB W/ECG: CPT

## 2024-06-21 ENCOUNTER — CLINICAL SUPPORT (OUTPATIENT)
Dept: CARDIAC REHAB | Facility: CLINIC | Age: 79
End: 2024-06-21
Payer: MEDICARE

## 2024-06-21 DIAGNOSIS — I20.89 CHRONIC STABLE ANGINA: Primary | ICD-10-CM

## 2024-06-21 PROCEDURE — 93798 PHYS/QHP OP CAR RHAB W/ECG: CPT

## 2024-06-24 ENCOUNTER — CLINICAL SUPPORT (OUTPATIENT)
Dept: CARDIAC REHAB | Facility: CLINIC | Age: 79
End: 2024-06-24
Payer: MEDICARE

## 2024-06-24 DIAGNOSIS — I20.89 CHRONIC STABLE ANGINA: Primary | ICD-10-CM

## 2024-06-24 PROCEDURE — 93798 PHYS/QHP OP CAR RHAB W/ECG: CPT

## 2024-06-26 ENCOUNTER — CLINICAL SUPPORT (OUTPATIENT)
Dept: CARDIAC REHAB | Facility: CLINIC | Age: 79
End: 2024-06-26
Payer: MEDICARE

## 2024-06-26 DIAGNOSIS — I20.89 CHRONIC STABLE ANGINA: Primary | ICD-10-CM

## 2024-06-26 PROCEDURE — 93798 PHYS/QHP OP CAR RHAB W/ECG: CPT

## 2024-06-28 ENCOUNTER — CLINICAL SUPPORT (OUTPATIENT)
Dept: CARDIAC REHAB | Facility: CLINIC | Age: 79
End: 2024-06-28
Payer: MEDICARE

## 2024-06-28 DIAGNOSIS — I20.89 CHRONIC STABLE ANGINA: Primary | ICD-10-CM

## 2024-06-28 PROCEDURE — 93798 PHYS/QHP OP CAR RHAB W/ECG: CPT

## 2024-07-01 ENCOUNTER — CLINICAL SUPPORT (OUTPATIENT)
Dept: CARDIAC REHAB | Facility: CLINIC | Age: 79
End: 2024-07-01
Payer: MEDICARE

## 2024-07-01 DIAGNOSIS — I20.89 CHRONIC STABLE ANGINA: Primary | ICD-10-CM

## 2024-07-01 PROCEDURE — 93798 PHYS/QHP OP CAR RHAB W/ECG: CPT

## 2024-07-03 ENCOUNTER — CLINICAL SUPPORT (OUTPATIENT)
Dept: CARDIAC REHAB | Facility: CLINIC | Age: 79
End: 2024-07-03
Payer: MEDICARE

## 2024-07-03 DIAGNOSIS — I20.89 CHRONIC STABLE ANGINA: Primary | ICD-10-CM

## 2024-07-03 PROCEDURE — 93798 PHYS/QHP OP CAR RHAB W/ECG: CPT

## 2024-07-05 ENCOUNTER — CLINICAL SUPPORT (OUTPATIENT)
Dept: CARDIAC REHAB | Facility: CLINIC | Age: 79
End: 2024-07-05
Payer: MEDICARE

## 2024-07-05 DIAGNOSIS — I20.89 CHRONIC STABLE ANGINA: Primary | ICD-10-CM

## 2024-07-05 PROCEDURE — 93798 PHYS/QHP OP CAR RHAB W/ECG: CPT

## 2024-07-08 ENCOUNTER — CLINICAL SUPPORT (OUTPATIENT)
Dept: CARDIAC REHAB | Facility: CLINIC | Age: 79
End: 2024-07-08
Payer: MEDICARE

## 2024-07-08 DIAGNOSIS — I20.89 CHRONIC STABLE ANGINA: Primary | ICD-10-CM

## 2024-07-08 PROCEDURE — 93798 PHYS/QHP OP CAR RHAB W/ECG: CPT

## 2024-07-10 ENCOUNTER — CLINICAL SUPPORT (OUTPATIENT)
Dept: CARDIAC REHAB | Facility: CLINIC | Age: 79
End: 2024-07-10
Payer: MEDICARE

## 2024-07-10 DIAGNOSIS — I20.89 CHRONIC STABLE ANGINA: Primary | ICD-10-CM

## 2024-07-10 PROCEDURE — 93798 PHYS/QHP OP CAR RHAB W/ECG: CPT

## 2024-07-10 NOTE — PROGRESS NOTES
CARDIAC REHABILITATION ASSESSMENT AND INDIVIDUALIZED TREATMENT PLAN  30 DAY      See scanned exercise session detailed report    Today's date: 7/10/2024   # of Exercise Sessions Completed:   Patient name: Meka See      : 1945  Age: 79 y.o.       MRN: [id number]  Referring Physician: Domenico Waller DO  Cardiologist: Domenico Waller DO  Provider: Wilfredo  Clinician: Naila Rodriguez RN         Comments:   7/10/2024 Meka has attended 11 exercise sessions in the past 30 days.   She tolerates 31-40 mins at 2.0-2.6 METs.  A light strength training component will be added in a future exercise session..   She is tolerating progression of intensity levels to maintain RPE 4-6.   Resting BP  104/66 - 150/74 with Normal response to exercise reaching 112/72- 142/84.  NSR on tele with PAC, PVC observed.  RHR 74 - 91  with Normal response to exercise reaching 92 - 101.    No cardiac complaints.  Patient attends group educational classes on cardiac risk factor modification.   Her exercise program will be progressed as tolerated to maintain RPE 4-6.  They will continue to be educated on lifestyle modification and encouraged to supplement with a home exercise program as tolerated.    2024 Completed initial evaluation. Explained cardiac rehabilitation, cardiac risk factors, how the heart functions, Ejection fraction, heart healthy diet, RPE scale, angina, diabetes and exercise. Completed sub max treadmill test. Telemetry monitor NSR at rest and with exercise. Exercise MET level 1.77 RPE 6. Complained of fatigue and SOBOEl. Denied any complaint of chest discomfort. Provided her with handbook for cardiac rehabilitation and low sodium recipes. Pt stated goals included:attend cardiac rehabilitation regularly, increase endurance and strength to play with grandchildren without fatigue. Walk a longer distance without fatigue and shortness of breath with minimal exertion, decrease weight with increase activity, smaller  food portions and reading labels for sodium, fat and carbohydrates.         Dx:   Encounter Diagnosis   Name Primary?    Chronic stable angina Yes       Description of Diagnosis: She has shortness of breath with exertion along with some chest tightness at times. She has chronic LE Edema.     Date of onset: 3/26/2024    Other Cardiac History: she has a history of coronary artery disease with previous multivessel PCI in 2012 and 2013 (PCI mLAD and D1 stents - 1/9/2013 + PCI: pRCA, mRCA, PDA - 5/29/2012 .  She denies any history of myocardial infarction.  She was feeling fatigue and weakness prior to stent placement which improved after her stents were complete.  Nuclear stress test was done which did not show any ischemia or infarction.  Ejection fraction was 80%  Echocardiogram showed ejection fraction of 60% with moderate LA dilation and mild valve disease. CAD, HTN, MI, DMII, HLD        ASSESSMENT    Medical History:   Past Medical History:   Diagnosis Date    Coronary artery disease     pt has 6 stents total-2012 x1, 5/29/12-x3, 1/9/13 x2    Diabetes mellitus (HCC)     GERD (gastroesophageal reflux disease)     High cholesterol     Hypertension     Myocardial infarction (HCC) 03/2012    x1 stent    Shortness of breath     with exertion       Family History:  Family History   Problem Relation Age of Onset    Diabetes Mother     Arthritis Mother     Endometriosis Sister     Cancer Maternal Grandmother         uterine or ovarian - not sure       Allergies:   Prasugrel    Current Medications:   Current Outpatient Medications   Medication Sig Dispense Refill    albuterol (ProAir HFA) 90 mcg/act inhaler Inhale 2 puffs every 4 (four) hours as needed for wheezing or shortness of breath (Patient not taking: Reported on 5/3/2024) 8.5 g 7    amLODIPine (NORVASC) 5 mg tablet Take 1 tablet (5 mg total) by mouth daily 90 tablet 0    ASPIRIN 81 PO Take 81 mg by mouth every morning      atorvastatin (LIPITOR) 80 mg tablet Take 1  tablet (80 mg total) by mouth daily 90 tablet 1    Calcium Carbonate (CALTRATE 600 PO) Take by mouth in the morning      carvedilol (COREG) 12.5 mg tablet Take 1 tablet (12.5 mg total) by mouth 2 (two) times a day with meals 180 tablet 0    esomeprazole (NexIUM) 40 MG capsule Take 40 mg by mouth every morning      estradiol (ESTRACE VAGINAL) 0.1 mg/g vaginal cream Apply pea size amount around the opening of the vagina daily x 14 days, followed by twice weekly. 42.5 g 1    meclizine (ANTIVERT) 12.5 MG tablet Take 1 tablet (12.5 mg total) by mouth 3 (three) times a day as needed for dizziness 30 tablet 0    metFORMIN (GLUCOPHAGE-XR) 500 mg 24 hr tablet Take 2 tablets (1,000 mg total) by mouth 2 (two) times a day with meals 360 tablet 1    ramipril (ALTACE) 10 MG capsule Take 1 capsule (10 mg total) by mouth daily at bedtime 90 capsule 1    Triprolidine-Pseudoephedrine (ANTIHISTAMINE PO) Take by mouth daily at bedtime      Vitamin D, Cholecalciferol, 25 MCG (1000 UT) TABS Take by mouth       No current facility-administered medications for this visit.       Medication compliance: Yes   Comments: Pt reports to be compliant with medications    Physical Limitations: Shortness of breath, Chronic chest discomfort, right knee discomfort    Fall Risk: Moderate   Comments: Reports a fall in the past 6 months    Cultural needs: none      CAD Risk Factors:  Cholesterol: Yes  HTN: Yes  DM: Type 2   PCP monitors A1c not required to monitor home BG  No insulin  Obesity: Yes   Inactivity: Yes      EXERCISE ASSESSMENT:     Initial Fitness Assessment: Submaximal TM ETT:  Resting:  BP: 122/70  HR: 80, Exercise:  BP: 136/78  HR: 100, METs:  1.77, ECG Summary: NSR, Symptoms: SOBOE, fatigue, and Test terminated at:  RPE 6      Functional Status Prior to Diagnosis for Treatment:   Occupation: retired  Recreation/Physical Activity: watch TV, Knitting, bike riding (3 years ago)  ADL’s: able to perform self-care  Benson: able to perform  "self-care  Home exercise equipment:  stationary bike and outdoor bike  Home exercise: none  Other: PT for right knee    Current Functional Status:  Occupation: retired  Recreation/Physical Activity: watch TV, sedentary  ADL’s:able to perform self-care  Evangeline: able to perform self-care  Home exercise: none  Other: PT for right knee    Functional Capacity Screening Tool:  [facility] Activity Status Index:  6.36 METs      PSYCHOSOCIAL ASSESSMENT:    Depression screening:  PHQ-9 = 13    Interpretation:  10-14 = Moderate Depression  Anxiety screening:  SCOOTER-7 = 0    Interpretation: 0-4  = Not anxious    Pt self-report of depression and anxiety   Patient reports they are coping well with good social support and denies depression or anxiety  Reports sufficient emotional support   encouraged Pt to speak to PCP about their emotional health concerns  She is not interested in intervention of PHQ9 of 13.       Self-reported stress level:   0   Stressors:  denies any stress  Stress Management Tools: exercise, keep a positive mindset, and TV    Quality of Life Screen:  (Higher score indicates disease impact on QOL)  [facility] COOP score: 26/40        Social Support:   significant other  Community/Social Activities: none     Psychosocial Assessment as it relates to rehabilitation:   Patient denies issues with his/her family or home life that may affect their rehabilitation efforts.       NUTRITION ASSESSMENT:    Weight:    Wt Readings from Last 1 Encounters:   05/03/24 94.3 kg (208 lb)        Height:   Ht Readings from Last 1 Encounters:   05/03/24 5' 4.5\" (1.638 m)       Rate Your Plate Score: 53/81    Diabetes: T2D, Pt not required to monitor home blood sugar, no insulin  A1c: 7.6    last measured: 11/3/2023    Lipid management: Discussed diet and lipid management and Last lipid profile 4/20/2023  Chol 141    HDL 45  LDL 70    Current Dietary Habits:  She stated she will decrease food portions and read labels for sodium, " fat and carbohydrates    Drug/Alcohol Use:   Yes, 0-2 a week       OTHER CORE COMPONENT ASSESSMENT:    Tobacco Use:     Pt quit 2012   and has abstained    Anginal Symptoms:  LOCKETT and fatigue   NTG use: No prescription        INDIVIDUALIZED TREATMENT PLAN      Patient will attend 35 monitored exercise sessions beginning 6/19/2024.    See outlined plan of care below for specific patient goals in each component of care.        EXERCISE GOAL and PLAN      SMART Goals:   10% improvement in functional capacity based on max METs achieved in initial fitness assessment  reduced dyspnea with physical activity    improved DASI score by 10%  maintain > 150 minutes per week of moderate intensity exercise    Patient Specific EXERCISE GOALS:       attend rehab regularly, decrease sitting time, start a home exercise program, and increase endurance to play with grandchildren and walk a longer distance without fatigue and shortness of breath, return to using bikes at home    Progress toward SMART and personal Exercise goals: Patient is agreeable to attend cardiopulmonary rehab exercise sessions 3x/wk x 36 sessions. and She stated she noticed a slight increase in energy and endurance.  She completes 2.0-2.6 MET's    Plan for next 30 days:    Attend cardiac rehabilitation regularly, complete 31 to 45 minutes of aerobic activity, increase time and resistance as tolerated    The patient was counseled on exercise guidelines to achieve a minimum of 150 mins/wk of moderate intensity (RPE 4-6)   exercise and encouraged to add 1-2 days of exercise on opposite days of cardiac rehab as tolerated.     Current Aerobic Exercise Prescription:      Frequency: 3 days/week   Supplement with home exercise 2+ days/wk as tolerated       Minutes: 31 - 40         METS: 2.0-2.6           HR:     RPE: 4-6         Modalities: UBE, NuStep, Recumbent bike, and Room walking     Exercise workloads will be progressed gradually as tolerated, within limits of  patient's ability, and according to the patient's   response to the exercise program.      Aerobic Exercise Prescription - 30 day goal:   Frequency: 3 days/week of cardiac rehab       Supplement with home exercise 2+ days/wk as tolerated    Minutes: 31-45    >150 mins/wk of moderate intensity exercise   METS: 1.5-3.0   HR: 100-120     RPE: 4-6   Modalities: UBE, NuStep, Recumbent bike, and Room walking    Strength trainin-3 days / week  12-15 repetitions  1-2 sets per modality   Will be added following at least 8 weeks post surgery and 8-10 monitored sessions   Modalities: Pull Downs, Arm Curl, Sit to Stands, Upright Rows, Front Raises, and Shoulder Shrugs    Home Exercise: none    Group and Individual Education: benefit of exercise for CAD risk factors, AHA guidelines to achieve >150 mins/wk of moderate exercise, and RPE scale     Readiness to change: Contemplation:  (Acknowledging that there is a problem but not yet ready or sure of wanting to make a change)      NUTRITION GOALS AND PLAN    Weight control:    Starting weight: 210   Current weight: 209    Nutritional   Reviewed patient's Rate your Plate. Discussed key elements of heart healthy eating. Reviewed patient goals for dietary modifications and their clinical implications.  Reviewed most recent lipid profile.     SMART Goals:   2.5-5%  wt loss    Patient Specific NUTRITION GOALS:     Decrease food portions, read food labels    Patient's progress toward SMART and personal Nutrition goals:   Patient is agreeable to making dietary modifications. Her weight decreased 1 pound in 30 days.     Plan for next 30 days:   Decrease food portions, decrease consumption of sodium, fat and carbohydrates    Measurable goals were based Rate Your Plate Dietary Self-Assessment. These are the areas in which the patient could score higher on the assessment.  Goals include recommendations for a heart healthy diet based on American Heart Association.    Group and Individual  Education:   exercise and diabetes management   group class: Heart Healthy Eating  effects of exercise and diet on glycemic control    Readiness to change: Pre-Contemplation:   (Not yet acknowledging that there is a problem behavior that needs to change)      PSYCHOSOCIAL ASSESSMENT AND PLAN    Psychosocial Assessment as it relates to rehabilitation:   Patient denies issues with his/her family or home life that may affect their rehabilitation efforts.     SMART Goals:     PHQ-9 - reduced severity by one level, improved sleeping habits, and feel less tired with more energy    Patient Specific PSYCHOCOSOCIAL GOALS:      Encourage patient to use [facility] program, she is not interested in medical intervention for PHQ9 of 13 at this time    Patient's progress toward SMART and personal Psychosocial goals:   She is not interested in medical intervention for emotional health at this time, discussed relaxation breathing, guided imagery and silver cloud program    Plan for next 30 days:   Practice relaxation techniques, Exercise, Keep a positive mindset, and Repeat PHQ-9 every 30 days if score >5    Group and Individual Education: benefits of a positive support system, benefits of enrolling in [facility], depression and CAD, and benefits of mental health counseling    Information to utilize [facility] [employer] was provided as well as contact information for counseling through  Behavioral [facility] and group psychotherapy groups available.    Readiness to change: Pre-Contemplation:   (Not yet acknowledging that there is a problem behavior that needs to change)      OTHER CORE COMPONENTS GOALS and PLAN      Blood Pressure will be monitored throughout the program and cardiologist will be notified of elevated trends.    Pt will be encouraged to monitor home BP if advised by cardiologist.    Tobacco Intervention:   Pt quit 2012 and has abstained since quitting.      SMART Goals:   consistent, controlled resting BP < 130/80 and  medication compliance    Patient Specific CORE COMPONENT GOALS:    medication compliance and decrease sodium in diet    Progress toward SMART and personal Core Component goals:    She will start reading food labels for sodium content, continue to take her medication as prescribed    Plan for next 30 days:   medication compliance, engage in regular exercise, and check labels for sodium content    Group and Individual Education:  understanding high blood pressure and it's relationship to CAD    Readiness to change: Preparation:  (Getting ready to change)

## 2024-07-12 ENCOUNTER — CLINICAL SUPPORT (OUTPATIENT)
Dept: CARDIAC REHAB | Facility: CLINIC | Age: 79
End: 2024-07-12
Payer: MEDICARE

## 2024-07-12 DIAGNOSIS — I20.89 CHRONIC STABLE ANGINA: Primary | ICD-10-CM

## 2024-07-12 PROCEDURE — 93798 PHYS/QHP OP CAR RHAB W/ECG: CPT

## 2024-07-15 ENCOUNTER — CLINICAL SUPPORT (OUTPATIENT)
Dept: CARDIAC REHAB | Facility: CLINIC | Age: 79
End: 2024-07-15
Payer: MEDICARE

## 2024-07-15 DIAGNOSIS — I20.89 CHRONIC STABLE ANGINA: Primary | ICD-10-CM

## 2024-07-15 PROCEDURE — 93798 PHYS/QHP OP CAR RHAB W/ECG: CPT

## 2024-07-17 ENCOUNTER — CLINICAL SUPPORT (OUTPATIENT)
Dept: CARDIAC REHAB | Facility: CLINIC | Age: 79
End: 2024-07-17
Payer: MEDICARE

## 2024-07-17 DIAGNOSIS — I20.89 CHRONIC STABLE ANGINA: Primary | ICD-10-CM

## 2024-07-17 PROCEDURE — 93798 PHYS/QHP OP CAR RHAB W/ECG: CPT

## 2024-07-18 ENCOUNTER — PATIENT MESSAGE (OUTPATIENT)
Dept: FAMILY MEDICINE CLINIC | Facility: CLINIC | Age: 79
End: 2024-07-18

## 2024-07-18 NOTE — PATIENT COMMUNICATION
Called patient. No appts available. Patient was warm transferred to [facility] for further assistance.

## 2024-07-19 ENCOUNTER — OFFICE VISIT (OUTPATIENT)
Dept: FAMILY MEDICINE CLINIC | Facility: CLINIC | Age: 79
End: 2024-07-19
Payer: MEDICARE

## 2024-07-19 ENCOUNTER — CLINICAL SUPPORT (OUTPATIENT)
Dept: CARDIAC REHAB | Facility: CLINIC | Age: 79
End: 2024-07-19
Payer: MEDICARE

## 2024-07-19 VITALS
HEART RATE: 64 BPM | TEMPERATURE: 96 F | SYSTOLIC BLOOD PRESSURE: 118 MMHG | DIASTOLIC BLOOD PRESSURE: 70 MMHG | HEIGHT: 65 IN | WEIGHT: 209 LBS | RESPIRATION RATE: 20 BRPM | BODY MASS INDEX: 34.82 KG/M2

## 2024-07-19 DIAGNOSIS — I20.89 CHRONIC STABLE ANGINA: Primary | ICD-10-CM

## 2024-07-19 DIAGNOSIS — B37.2 INTERTRIGINOUS CANDIDIASIS: Primary | ICD-10-CM

## 2024-07-19 PROCEDURE — 99213 OFFICE O/P EST LOW 20 MIN: CPT | Performed by: NURSE PRACTITIONER

## 2024-07-19 PROCEDURE — G2211 COMPLEX E/M VISIT ADD ON: HCPCS | Performed by: NURSE PRACTITIONER

## 2024-07-19 PROCEDURE — 93798 PHYS/QHP OP CAR RHAB W/ECG: CPT

## 2024-07-19 RX ORDER — CLOTRIMAZOLE AND BETAMETHASONE DIPROPIONATE 10; .64 MG/G; MG/G
CREAM TOPICAL 2 TIMES DAILY
Qty: 45 G | Refills: 2 | Status: SHIPPED | OUTPATIENT
Start: 2024-07-19

## 2024-07-19 NOTE — PROGRESS NOTES
Ambulatory Visit  Name: Meka See      : 1945      MRN: [id number]  Encounter Provider: DAVID Wang  Encounter Date: 2024   Encounter department: [facility]      Discussed cleansing with mild soap and water and make sure skin is dry.  Recommended cotton garments.  Apply topical and may also use topical antifungal powder OTC.    F/u as needed    Assessment & Plan   1. Intertriginous candidiasis  -     clotrimazole-betamethasone (LOTRISONE) 1-0.05 % cream; Apply topically 2 (two) times a day     History of Present Illness     She has had an itchy, red rash under both breasts and in her groin for the past couple of months.  Worse on warmer days.  Has used OTC Aveeno and cortisone.          Review of Systems   Constitutional: Negative.    Skin:  Positive for rash.     Current Outpatient Medications on File Prior to Visit   Medication Sig Dispense Refill   • albuterol (ProAir HFA) 90 mcg/act inhaler Inhale 2 puffs every 4 (four) hours as needed for wheezing or shortness of breath 8.5 g 7   • amLODIPine (NORVASC) 5 mg tablet Take 1 tablet (5 mg total) by mouth daily 90 tablet 0   • ASPIRIN 81 PO Take 81 mg by mouth every morning     • atorvastatin (LIPITOR) 80 mg tablet Take 1 tablet (80 mg total) by mouth daily 90 tablet 1   • Calcium Carbonate (CALTRATE 600 PO) Take by mouth in the morning     • carvedilol (COREG) 12.5 mg tablet Take 1 tablet (12.5 mg total) by mouth 2 (two) times a day with meals 180 tablet 0   • esomeprazole (NexIUM) 40 MG capsule Take 40 mg by mouth every morning     • estradiol (ESTRACE VAGINAL) 0.1 mg/g vaginal cream Apply pea size amount around the opening of the vagina daily x 14 days, followed by twice weekly. 42.5 g 1   • meclizine (ANTIVERT) 12.5 MG tablet Take 1 tablet (12.5 mg total) by mouth 3 (three) times a day as needed for dizziness 30 tablet 0   • metFORMIN (GLUCOPHAGE-XR) 500 mg 24 hr tablet Take 2 tablets (1,000 mg total) by mouth 2  "(two) times a day with meals 360 tablet 1   • ramipril (ALTACE) 10 MG capsule Take 1 capsule (10 mg total) by mouth daily at bedtime 90 capsule 1   • Triprolidine-Pseudoephedrine (ANTIHISTAMINE PO) Take by mouth daily at bedtime     • Vitamin D, Cholecalciferol, 25 MCG (1000 UT) TABS Take by mouth 2 (two) times a day       No current facility-administered medications on file prior to visit.      Social History     Tobacco Use   • Smoking status: Former     Current packs/day: 0.00     Average packs/day: 0.5 packs/day for 45.0 years (22.5 ttl pk-yrs)     Types: Cigarettes     Start date: 6/15/1967     Quit date: 6/15/2012     Years since quittin.1     Passive exposure: Never   • Smokeless tobacco: Never   Vaping Use   • Vaping status: Never Used   Substance and Sexual Activity   • Alcohol use: Yes     Comment: rare   • Drug use: Never   • Sexual activity: Yes     Partners: Male     Birth control/protection: Post-menopausal     Objective     /70   Pulse 64   Temp (!) 96 °F (35.6 °C)   Resp 20   Ht 5' 4.5\" (1.638 m)   Wt 94.8 kg (209 lb)   BMI 35.32 kg/m²     Physical Exam  Vitals and nursing note reviewed.   Constitutional:       General: She is not in acute distress.     Appearance: Normal appearance. She is well-developed. She is not diaphoretic.   Eyes:      Conjunctiva/sclera: Conjunctivae normal.   Pulmonary:      Effort: Pulmonary effort is normal. No respiratory distress.   Skin:     Comments: Moist, red rash underneath both breasts and groin   Neurological:      Mental Status: She is alert.   Psychiatric:         Mood and Affect: Mood normal.         Behavior: Behavior normal.       Administrative Statements           "

## 2024-07-22 ENCOUNTER — CLINICAL SUPPORT (OUTPATIENT)
Dept: CARDIAC REHAB | Facility: CLINIC | Age: 79
End: 2024-07-22
Payer: MEDICARE

## 2024-07-22 DIAGNOSIS — I20.89 CHRONIC STABLE ANGINA: Primary | ICD-10-CM

## 2024-07-22 PROCEDURE — 93798 PHYS/QHP OP CAR RHAB W/ECG: CPT

## 2024-07-24 ENCOUNTER — CLINICAL SUPPORT (OUTPATIENT)
Dept: CARDIAC REHAB | Facility: CLINIC | Age: 79
End: 2024-07-24
Payer: MEDICARE

## 2024-07-24 DIAGNOSIS — I20.89 CHRONIC STABLE ANGINA: Primary | ICD-10-CM

## 2024-07-24 PROCEDURE — 93798 PHYS/QHP OP CAR RHAB W/ECG: CPT

## 2024-07-26 ENCOUNTER — CLINICAL SUPPORT (OUTPATIENT)
Dept: CARDIAC REHAB | Facility: CLINIC | Age: 79
End: 2024-07-26
Payer: MEDICARE

## 2024-07-26 DIAGNOSIS — I20.89 CHRONIC STABLE ANGINA: Primary | ICD-10-CM

## 2024-07-26 PROCEDURE — 93798 PHYS/QHP OP CAR RHAB W/ECG: CPT

## 2024-07-29 ENCOUNTER — CLINICAL SUPPORT (OUTPATIENT)
Dept: CARDIAC REHAB | Facility: CLINIC | Age: 79
End: 2024-07-29
Payer: MEDICARE

## 2024-07-29 DIAGNOSIS — I20.89 CHRONIC STABLE ANGINA: Primary | ICD-10-CM

## 2024-07-29 PROCEDURE — 93798 PHYS/QHP OP CAR RHAB W/ECG: CPT

## 2024-07-31 ENCOUNTER — CLINICAL SUPPORT (OUTPATIENT)
Dept: CARDIAC REHAB | Facility: CLINIC | Age: 79
End: 2024-07-31
Payer: MEDICARE

## 2024-07-31 DIAGNOSIS — I20.89 CHRONIC STABLE ANGINA: Primary | ICD-10-CM

## 2024-07-31 PROCEDURE — 93798 PHYS/QHP OP CAR RHAB W/ECG: CPT

## 2024-08-02 ENCOUNTER — CLINICAL SUPPORT (OUTPATIENT)
Dept: CARDIAC REHAB | Facility: CLINIC | Age: 79
End: 2024-08-02
Payer: MEDICARE

## 2024-08-02 DIAGNOSIS — I20.89 CHRONIC STABLE ANGINA: Primary | ICD-10-CM

## 2024-08-02 PROCEDURE — 93798 PHYS/QHP OP CAR RHAB W/ECG: CPT

## 2024-08-05 ENCOUNTER — CLINICAL SUPPORT (OUTPATIENT)
Dept: CARDIAC REHAB | Facility: CLINIC | Age: 79
End: 2024-08-05
Payer: MEDICARE

## 2024-08-05 DIAGNOSIS — I20.89 CHRONIC STABLE ANGINA: Primary | ICD-10-CM

## 2024-08-05 PROCEDURE — 93798 PHYS/QHP OP CAR RHAB W/ECG: CPT

## 2024-08-07 ENCOUNTER — CLINICAL SUPPORT (OUTPATIENT)
Dept: CARDIAC REHAB | Facility: CLINIC | Age: 79
End: 2024-08-07
Payer: MEDICARE

## 2024-08-07 DIAGNOSIS — I20.89 CHRONIC STABLE ANGINA: Primary | ICD-10-CM

## 2024-08-07 PROCEDURE — 93798 PHYS/QHP OP CAR RHAB W/ECG: CPT

## 2024-08-09 ENCOUNTER — CLINICAL SUPPORT (OUTPATIENT)
Dept: CARDIAC REHAB | Facility: CLINIC | Age: 79
End: 2024-08-09
Payer: MEDICARE

## 2024-08-09 DIAGNOSIS — I20.89 CHRONIC STABLE ANGINA: Primary | ICD-10-CM

## 2024-08-09 DIAGNOSIS — I25.10 CORONARY ARTERY DISEASE INVOLVING NATIVE CORONARY ARTERY OF NATIVE HEART WITHOUT ANGINA PECTORIS: ICD-10-CM

## 2024-08-09 PROCEDURE — 93798 PHYS/QHP OP CAR RHAB W/ECG: CPT

## 2024-08-09 RX ORDER — ATORVASTATIN CALCIUM 80 MG/1
80 TABLET, FILM COATED ORAL DAILY
Qty: 100 TABLET | Refills: 1 | Status: SHIPPED | OUTPATIENT
Start: 2024-08-09 | End: 2025-08-04

## 2024-08-09 NOTE — PROGRESS NOTES
CARDIAC REHABILITATION ASSESSMENT AND INDIVIDUALIZED TREATMENT PLAN  60 DAY      See scanned exercise session detailed report    Today's date: 2024   # of Exercise Sessions Completed: 23  Patient name: Meka See      : 1945  Age: 79 y.o.       MRN: [id number]  Referring Physician: Domenico Waller DO  Cardiologist: Domenico Waller DO  Provider: Wilfredo  Clinician: Ileana Montalvo MS, CEP        Comments:   2024 Meka has attended 12 exercise sessions in the past 30 days.   She tolerates 32-41 mins at 2.0 - 2.7 METs.  A light strength training component has not been added to their exercise program..   She is tolerating progression of intensity levels to maintain RPE 4-6.   Resting BP  100/60 - 168/82 with Normal response to exercise reaching 110/62- 154/82.  NSR on telemetry observed.  RHR 71 - 90  with Normal response to exercise reaching 81 - 108.    No cardiac complaints while at cardiac rehab but she has gotten chest tightness at home.     Her exercise program will be progressed as tolerated to maintain RPE 4-6.  They will continue to be educated on lifestyle modification and encouraged to supplement with a home exercise program as tolerated.     7/10/2024 Meka has attended 11 exercise sessions in the past 30 days.   She tolerates 31-40 mins at 2.0-2.6 METs.  A light strength training component will be added in a future exercise session..   She is tolerating progression of intensity levels to maintain RPE 4-6.   Resting BP  104/66 - 150/74 with Normal response to exercise reaching 112/72- 142/84.  NSR on tele with PAC, PVC observed.  RHR 74 - 91  with Normal response to exercise reaching 92 - 101.    No cardiac complaints.  Patient attends group educational classes on cardiac risk factor modification.   Her exercise program will be progressed as tolerated to maintain RPE 4-6.  They will continue to be educated on lifestyle modification and encouraged to supplement with a home exercise program as  tolerated.    6/13/2024 Completed initial evaluation. Explained cardiac rehabilitation, cardiac risk factors, how the heart functions, Ejection fraction, heart healthy diet, RPE scale, angina, diabetes and exercise. Completed sub max treadmill test. Telemetry monitor NSR at rest and with exercise. Exercise MET level 1.77 RPE 6. Complained of fatigue and SOBOEl. Denied any complaint of chest discomfort. Provided her with handbook for cardiac rehabilitation and low sodium recipes. Pt stated goals included:attend cardiac rehabilitation regularly, increase endurance and strength to play with grandchildren without fatigue. Walk a longer distance without fatigue and shortness of breath with minimal exertion, decrease weight with increase activity, smaller food portions and reading labels for sodium, fat and carbohydrates.         Dx:   Encounter Diagnosis   Name Primary?    Chronic stable angina Yes       Description of Diagnosis: She has shortness of breath with exertion along with some chest tightness at times. She has chronic LE Edema.     Date of onset: 3/26/2024    Other Cardiac History: she has a history of coronary artery disease with previous multivessel PCI in 2012 and 2013 (PCI mLAD and D1 stents - 1/9/2013 + PCI: pRCA, mRCA, PDA - 5/29/2012 .  She denies any history of myocardial infarction.  She was feeling fatigue and weakness prior to stent placement which improved after her stents were complete.  Nuclear stress test was done which did not show any ischemia or infarction.  Ejection fraction was 80%  Echocardiogram showed ejection fraction of 60% with moderate LA dilation and mild valve disease. CAD, HTN, MI, DMII, HLD        ASSESSMENT    Medical History:   Past Medical History:   Diagnosis Date    Coronary artery disease     pt has 6 stents total-2012 x1, 5/29/12-x3, 1/9/13 x2    Diabetes mellitus (HCC)     GERD (gastroesophageal reflux disease)     High cholesterol     Hypertension     Myocardial  infarction (HCC) 03/2012    x1 stent    Shortness of breath     with exertion       Family History:  Family History   Problem Relation Age of Onset    Diabetes Mother     Arthritis Mother     Endometriosis Sister     Cancer Maternal Grandmother         uterine or ovarian - not sure       Allergies:   Prasugrel    Current Medications:   Current Outpatient Medications   Medication Sig Dispense Refill    albuterol (ProAir HFA) 90 mcg/act inhaler Inhale 2 puffs every 4 (four) hours as needed for wheezing or shortness of breath 8.5 g 7    amLODIPine (NORVASC) 5 mg tablet Take 1 tablet (5 mg total) by mouth daily 90 tablet 0    ASPIRIN 81 PO Take 81 mg by mouth every morning      atorvastatin (LIPITOR) 80 mg tablet Take 1 tablet (80 mg total) by mouth daily 90 tablet 1    Calcium Carbonate (CALTRATE 600 PO) Take by mouth in the morning      carvedilol (COREG) 12.5 mg tablet Take 1 tablet (12.5 mg total) by mouth 2 (two) times a day with meals 180 tablet 0    clotrimazole-betamethasone (LOTRISONE) 1-0.05 % cream Apply topically 2 (two) times a day 45 g 2    esomeprazole (NexIUM) 40 MG capsule Take 40 mg by mouth every morning      estradiol (ESTRACE VAGINAL) 0.1 mg/g vaginal cream Apply pea size amount around the opening of the vagina daily x 14 days, followed by twice weekly. 42.5 g 1    meclizine (ANTIVERT) 12.5 MG tablet Take 1 tablet (12.5 mg total) by mouth 3 (three) times a day as needed for dizziness 30 tablet 0    metFORMIN (GLUCOPHAGE-XR) 500 mg 24 hr tablet Take 2 tablets (1,000 mg total) by mouth 2 (two) times a day with meals 360 tablet 1    ramipril (ALTACE) 10 MG capsule Take 1 capsule (10 mg total) by mouth daily at bedtime 90 capsule 1    Triprolidine-Pseudoephedrine (ANTIHISTAMINE PO) Take by mouth daily at bedtime      Vitamin D, Cholecalciferol, 25 MCG (1000 UT) TABS Take by mouth 2 (two) times a day       No current facility-administered medications for this visit.       Medication compliance:  Yes   Comments: Pt reports to be compliant with medications    Physical Limitations: Shortness of breath, Chronic chest discomfort, right knee discomfort    Fall Risk: Moderate   Comments: Reports a fall in the past 6 months    Cultural needs: none      CAD Risk Factors:  Cholesterol: Yes  HTN: Yes  DM: Type 2   PCP monitors A1c not required to monitor home BG  No insulin  Obesity: Yes   Inactivity: Yes      EXERCISE ASSESSMENT:     Initial Fitness Assessment: Submaximal TM ETT:  Resting:  BP: 122/70  HR: 80, Exercise:  BP: 136/78  HR: 100, METs:  1.77, ECG Summary: NSR, Symptoms: SOBOE, fatigue, and Test terminated at:  RPE 6      Functional Status Prior to Diagnosis for Treatment:   Occupation: retired  Recreation/Physical Activity: watch TV, Knitting, bike riding (3 years ago)  ADL’s: able to perform self-care  [place]: able to perform self-care  Home exercise equipment:  stationary bike and outdoor bike  Home exercise: none  Other: PT for right knee    Current Functional Status:  Occupation: retired  Recreation/Physical Activity: watch TV, sedentary  ADL’s:able to perform self-care  [place]: able to perform self-care  Home exercise: none  Other: PT for right knee    Functional Capacity Screening Tool:  [facility] Activity Status Index:  6.36 METs      PSYCHOSOCIAL ASSESSMENT:    Depression screening:  PHQ-9 = 13    Interpretation:  10-14 = Moderate Depression  Anxiety screening:  SCOOTER-7 = 0    Interpretation: 0-4  = Not anxious    Pt self-report of depression and anxiety   Patient reports they are coping well with good social support and denies depression or anxiety  Reports sufficient emotional support   encouraged Pt to speak to PCP about their emotional health concerns  She is not interested in intervention of PHQ9 of 13.       Self-reported stress level:   0   Stressors:  denies any stress  Stress Management Tools: exercise, keep a positive mindset, and TV    Quality of Life Screen:  (Higher score  "indicates disease impact on QOL)  [facility] COOP score: 26/40        Social Support:   significant other  Community/Social Activities: none     Psychosocial Assessment as it relates to rehabilitation:   Patient denies issues with his/her family or home life that may affect their rehabilitation efforts.       NUTRITION ASSESSMENT:    Weight:    Wt Readings from Last 1 Encounters:   07/19/24 94.8 kg (209 lb)        Height:   Ht Readings from Last 1 Encounters:   07/19/24 5' 4.5\" (1.638 m)       Rate Your Plate Score: 53/81    Diabetes: T2D, Pt not required to monitor home blood sugar, no insulin  A1c: 7.6    last measured: 11/3/2023    Lipid management: Discussed diet and lipid management and Last lipid profile 4/20/2023  Chol 141    HDL 45  LDL 70    Current Dietary Habits:  She stated she will decrease food portions and read labels for sodium, fat and carbohydrates    Drug/Alcohol Use:   Yes, 0-2 a week       OTHER CORE COMPONENT ASSESSMENT:    Tobacco Use:     Pt quit 2012   and has abstained    Anginal Symptoms:  LCOKETT and fatigue   NTG use: No prescription        INDIVIDUALIZED TREATMENT PLAN      Patient will attend 35 monitored exercise sessions beginning 6/19/2024.    See outlined plan of care below for specific patient goals in each component of care.        EXERCISE GOAL and PLAN      SMART Goals:   10% improvement in functional capacity based on max METs achieved in initial fitness assessment  reduced dyspnea with physical activity    improved DASI score by 10%  maintain > 150 minutes per week of moderate intensity exercise    Patient Specific EXERCISE GOALS:       attend rehab regularly, decrease sitting time, start a home exercise program, and increase endurance to play with grandchildren and walk a longer distance without fatigue and shortness of breath, return to using bikes at home    Progress toward SMART and personal Exercise goals: Patient is agreeable to attend cardiopulmonary rehab exercise " sessions 3x/wk x 36 sessions. and She stated she noticed a slight increase in energy and endurance.She stated she is walking a little longer and further than when she started rehab.  She completes 2.0-2.7 MET's    Plan for next 30 days:    Attend cardiac rehabilitation regularly, complete 32 to 41 minutes of aerobic activity, increase time and resistance as tolerated    The patient was counseled on exercise guidelines to achieve a minimum of 150 mins/wk of moderate intensity (RPE 4-6)   exercise and encouraged to add 1-2 days of exercise on opposite days of cardiac rehab as tolerated.     Current Aerobic Exercise Prescription:      Frequency: 3 days/week   Supplement with home exercise 2+ days/wk as tolerated       Minutes: 32-41         METS: 2.0-2.7          HR:     RPE: 4-6         Modalities: UBE, NuStep, Recumbent bike, and Room walking     Exercise workloads will be progressed gradually as tolerated, within limits of patient's ability, and according to the patient's   response to the exercise program.      Aerobic Exercise Prescription - 30 day goal:   Frequency: 3 days/week of cardiac rehab       Supplement with home exercise 2+ days/wk as tolerated    Minutes: 40-45    >150 mins/wk of moderate intensity exercise   METS: 2.7-3.2   HR: 100-120     RPE: 4-6   Modalities: UBE, NuStep, Recumbent bike, and Room walking    Strength trainin-3 days / week  12-15 repetitions  1-2 sets per modality   Will be added following at least 8 weeks post surgery and 8-10 monitored sessions   Modalities: Pull Downs, Arm Curl, Sit to Stands, Upright Rows, Front Raises, and Shoulder Shrugs    Home Exercise: none    Group and Individual Education: benefit of exercise for CAD risk factors, AHA guidelines to achieve >150 mins/wk of moderate exercise, and RPE scale     Readiness to change: Contemplation:  (Acknowledging that there is a problem but not yet ready or sure of wanting to make a change)      NUTRITION GOALS AND  PLAN    Weight control:    Starting weight: 210   Current weight: 209    Nutritional   Reviewed patient's Rate your Plate. Discussed key elements of heart healthy eating. Reviewed patient goals for dietary modifications and their clinical implications.  Reviewed most recent lipid profile.     SMART Goals:   2.5-5%  wt loss    Patient Specific NUTRITION GOALS:     Decrease food portions, read food labels    Patient's progress toward SMART and personal Nutrition goals:   Patient stated she has not made any dietary changes in the last 30 days. She stated she is eating fruits and vegetables that are currently in season.       Plan for next 30 days:   Decrease food portions, decrease consumption of sodium, fat and carbohydrates    Measurable goals were based Rate Your Plate Dietary Self-Assessment. These are the areas in which the patient could score higher on the assessment.  Goals include recommendations for a heart healthy diet based on American Heart Association.    Group and Individual Education:   exercise and diabetes management   group class: Heart Healthy Eating  effects of exercise and diet on glycemic control    Readiness to change: Pre-Contemplation:   (Not yet acknowledging that there is a problem behavior that needs to change)      PSYCHOSOCIAL ASSESSMENT AND PLAN    Psychosocial Assessment as it relates to rehabilitation:   Patient denies issues with his/her family or home life that may affect their rehabilitation efforts.     SMART Goals:     PHQ-9 - reduced severity by one level, improved sleeping habits, and feel less tired with more energy    Patient Specific PSYCHOCOSOCIAL GOALS:      Encourage patient to use [employer] program, she is not interested in medical intervention for PHQ9 of 13 at this time    Patient's progress toward SMART and personal Psychosocial goals:   She is not interested in medical intervention for emotional health at this time, discussed relaxation breathing, guided imagery and  [employer] program    Plan for next 30 days:   Practice relaxation techniques, Exercise, Keep a positive mindset, and Repeat PHQ-9 every 30 days if score >5    Group and Individual Education: benefits of a positive support system, benefits of enrolling in [facility], depression and CAD, and benefits of mental health counseling    Information to utilize [employer] was provided as well as contact information for counseling through  Behavioral Health and group psychotherapy groups available.    Readiness to change: Pre-Contemplation:   (Not yet acknowledging that there is a problem behavior that needs to change)      OTHER CORE COMPONENTS GOALS and PLAN      Blood Pressure will be monitored throughout the program and cardiologist will be notified of elevated trends.    Pt will be encouraged to monitor home BP if advised by cardiologist.    Tobacco Intervention:   Pt quit 2012 and has abstained since quitting.      SMART Goals:   consistent, controlled resting BP < 130/80 and medication compliance    Patient Specific CORE COMPONENT GOALS:    medication compliance and decrease sodium in diet    Progress toward SMART and personal Core Component goals:    She will start reading food labels for sodium content, continue to take her medication as prescribed    Plan for next 30 days:   medication compliance, engage in regular exercise, and check labels for sodium content    Group and Individual Education:  understanding high blood pressure and it's relationship to CAD    Readiness to change: Preparation:  (Getting ready to change)

## 2024-08-12 ENCOUNTER — CLINICAL SUPPORT (OUTPATIENT)
Dept: CARDIAC REHAB | Facility: CLINIC | Age: 79
End: 2024-08-12
Payer: MEDICARE

## 2024-08-12 DIAGNOSIS — I20.89 CHRONIC STABLE ANGINA: Primary | ICD-10-CM

## 2024-08-12 PROCEDURE — 93798 PHYS/QHP OP CAR RHAB W/ECG: CPT

## 2024-08-14 ENCOUNTER — CLINICAL SUPPORT (OUTPATIENT)
Dept: CARDIAC REHAB | Facility: CLINIC | Age: 79
End: 2024-08-14
Payer: MEDICARE

## 2024-08-14 DIAGNOSIS — I20.89 CHRONIC STABLE ANGINA: Primary | ICD-10-CM

## 2024-08-14 PROCEDURE — 93798 PHYS/QHP OP CAR RHAB W/ECG: CPT

## 2024-08-16 ENCOUNTER — CLINICAL SUPPORT (OUTPATIENT)
Dept: CARDIAC REHAB | Facility: CLINIC | Age: 79
End: 2024-08-16
Payer: MEDICARE

## 2024-08-16 DIAGNOSIS — I20.89 CHRONIC STABLE ANGINA: Primary | ICD-10-CM

## 2024-08-16 PROCEDURE — 93798 PHYS/QHP OP CAR RHAB W/ECG: CPT

## 2024-08-19 ENCOUNTER — CLINICAL SUPPORT (OUTPATIENT)
Dept: CARDIAC REHAB | Facility: CLINIC | Age: 79
End: 2024-08-19
Payer: MEDICARE

## 2024-08-19 DIAGNOSIS — I20.89 CHRONIC STABLE ANGINA: Primary | ICD-10-CM

## 2024-08-19 PROCEDURE — 93798 PHYS/QHP OP CAR RHAB W/ECG: CPT

## 2024-08-21 ENCOUNTER — CLINICAL SUPPORT (OUTPATIENT)
Dept: CARDIAC REHAB | Facility: CLINIC | Age: 79
End: 2024-08-21
Payer: MEDICARE

## 2024-08-21 DIAGNOSIS — I20.89 CHRONIC STABLE ANGINA: Primary | ICD-10-CM

## 2024-08-21 PROCEDURE — 93798 PHYS/QHP OP CAR RHAB W/ECG: CPT

## 2024-08-23 ENCOUNTER — CLINICAL SUPPORT (OUTPATIENT)
Dept: CARDIAC REHAB | Facility: CLINIC | Age: 79
End: 2024-08-23
Payer: MEDICARE

## 2024-08-23 DIAGNOSIS — I20.89 CHRONIC STABLE ANGINA: Primary | ICD-10-CM

## 2024-08-23 PROCEDURE — 93798 PHYS/QHP OP CAR RHAB W/ECG: CPT

## 2024-08-26 ENCOUNTER — CLINICAL SUPPORT (OUTPATIENT)
Dept: CARDIAC REHAB | Facility: CLINIC | Age: 79
End: 2024-08-26
Payer: MEDICARE

## 2024-08-26 DIAGNOSIS — I20.89 CHRONIC STABLE ANGINA: Primary | ICD-10-CM

## 2024-08-26 PROCEDURE — 93798 PHYS/QHP OP CAR RHAB W/ECG: CPT

## 2024-08-28 ENCOUNTER — CLINICAL SUPPORT (OUTPATIENT)
Dept: CARDIAC REHAB | Facility: CLINIC | Age: 79
End: 2024-08-28
Payer: MEDICARE

## 2024-08-28 DIAGNOSIS — I20.89 CHRONIC STABLE ANGINA: Primary | ICD-10-CM

## 2024-08-28 PROCEDURE — 93798 PHYS/QHP OP CAR RHAB W/ECG: CPT

## 2024-08-30 ENCOUNTER — CLINICAL SUPPORT (OUTPATIENT)
Dept: CARDIAC REHAB | Facility: CLINIC | Age: 79
End: 2024-08-30
Payer: MEDICARE

## 2024-08-30 DIAGNOSIS — I20.89 CHRONIC STABLE ANGINA: Primary | ICD-10-CM

## 2024-08-30 PROCEDURE — 93798 PHYS/QHP OP CAR RHAB W/ECG: CPT

## 2024-09-01 DIAGNOSIS — I25.10 CORONARY ARTERY DISEASE INVOLVING NATIVE CORONARY ARTERY OF NATIVE HEART WITHOUT ANGINA PECTORIS: ICD-10-CM

## 2024-09-01 DIAGNOSIS — E11.22 TYPE 2 DIABETES MELLITUS WITH STAGE 2 CHRONIC KIDNEY DISEASE, WITHOUT LONG-TERM CURRENT USE OF INSULIN  (HCC): ICD-10-CM

## 2024-09-01 DIAGNOSIS — N18.2 TYPE 2 DIABETES MELLITUS WITH STAGE 2 CHRONIC KIDNEY DISEASE, WITHOUT LONG-TERM CURRENT USE OF INSULIN  (HCC): ICD-10-CM

## 2024-09-02 RX ORDER — METFORMIN HCL 500 MG
1000 TABLET, EXTENDED RELEASE 24 HR ORAL 2 TIMES DAILY WITH MEALS
Qty: 360 TABLET | Refills: 1 | Status: SHIPPED | OUTPATIENT
Start: 2024-09-02 | End: 2025-03-01

## 2024-09-03 ENCOUNTER — CLINICAL SUPPORT (OUTPATIENT)
Dept: CARDIAC REHAB | Facility: CLINIC | Age: 79
End: 2024-09-03
Payer: MEDICARE

## 2024-09-03 DIAGNOSIS — I20.89 CHRONIC STABLE ANGINA: Primary | ICD-10-CM

## 2024-09-03 DIAGNOSIS — I25.10 CORONARY ARTERY DISEASE INVOLVING NATIVE CORONARY ARTERY OF NATIVE HEART WITHOUT ANGINA PECTORIS: ICD-10-CM

## 2024-09-03 PROCEDURE — 93798 PHYS/QHP OP CAR RHAB W/ECG: CPT

## 2024-09-03 RX ORDER — CARVEDILOL 12.5 MG/1
12.5 TABLET ORAL 2 TIMES DAILY WITH MEALS
Qty: 180 TABLET | Refills: 1 | Status: SHIPPED | OUTPATIENT
Start: 2024-09-03 | End: 2025-08-29

## 2024-09-04 ENCOUNTER — CLINICAL SUPPORT (OUTPATIENT)
Dept: CARDIAC REHAB | Facility: CLINIC | Age: 79
End: 2024-09-04
Payer: MEDICARE

## 2024-09-04 DIAGNOSIS — I20.89 CHRONIC STABLE ANGINA: Primary | ICD-10-CM

## 2024-09-04 PROCEDURE — 93798 PHYS/QHP OP CAR RHAB W/ECG: CPT

## 2024-09-04 RX ORDER — RAMIPRIL 10 MG/1
10 CAPSULE ORAL
Qty: 90 CAPSULE | Refills: 1 | Status: SHIPPED | OUTPATIENT
Start: 2024-09-04 | End: 2025-08-30

## 2024-09-04 RX ORDER — ATORVASTATIN CALCIUM 80 MG/1
80 TABLET, FILM COATED ORAL DAILY
Qty: 90 TABLET | Refills: 1 | Status: SHIPPED | OUTPATIENT
Start: 2024-09-04 | End: 2025-08-30

## 2024-09-05 ENCOUNTER — CLINICAL SUPPORT (OUTPATIENT)
Dept: CARDIAC REHAB | Facility: CLINIC | Age: 79
End: 2024-09-05
Payer: MEDICARE

## 2024-09-05 DIAGNOSIS — I20.89 CHRONIC STABLE ANGINA: Primary | ICD-10-CM

## 2024-09-05 PROCEDURE — 93798 PHYS/QHP OP CAR RHAB W/ECG: CPT

## 2024-09-05 NOTE — PROGRESS NOTES
CARDIAC REHABILITATION ASSESSMENT AND INDIVIDUALIZED TREATMENT PLAN  DISCHARGE        See scanned exercise session detailed report    Today's date: 2024   # of Exercise Sessions Completed: 36  Patient name: Meka See      : 1945  Age: 79 y.o.       MRN: [id number]  Referring Physician: Domenico Waller DO  Cardiologist: Domenico Waller DO  Provider: Wilfredo  Clinician: Naila Rodriguez RN         Comments:   2024 Meka has completed the 36 exercise sessions of the cardiac rehabilitation program. She tolerated 32-41 mins at 2.1 - 2.8 METs.   She is tolerating progression of intensity levels to maintain RPE 4-6.   Resting //82 with Normal response to exercise reaching 120//68.  NSR on telemetry observed.  RHR 71 - 98  with Normal response to exercise reaching .  She has c/o of occasional angina during exercise, symptoms subsided with rest. She has gotten chest tightness at home.  Her sum max TM test imrpoved 24% an increase from 1.77 to 2.2 MET's. RYP score improved from 53 to 58 score, weight decreased one pound since 2024. She stated she has noticed a slight increase in energy, is napping less, and is able to do activities with grandchildren. She will continue to f/u with cardiologist, She will try to use her stationary bike at home 30 to 40 minutes 3 to 5 times a week and will consider joining the fitness center.     2024 Meka has attended 12 exercise sessions in the past 30 days.   She tolerates 32-41 mins at 2.0 - 2.7 METs.  A light strength training component has not been added to their exercise program..   She is tolerating progression of intensity levels to maintain RPE 4-6.   Resting BP  100/60 - 168/82 with Normal response to exercise reaching 110/62- 154/82.  NSR on telemetry observed.  RHR 71 - 90  with Normal response to exercise reaching 81 - 108.    No cardiac complaints while at cardiac rehab but she has gotten chest tightness at home.     Her  exercise program will be progressed as tolerated to maintain RPE 4-6.  They will continue to be educated on lifestyle modification and encouraged to supplement with a home exercise program as tolerated.     7/10/2024 Meka has attended 11 exercise sessions in the past 30 days.   She tolerates 31-40 mins at 2.0-2.6 METs.  A light strength training component will be added in a future exercise session..   She is tolerating progression of intensity levels to maintain RPE 4-6.   Resting BP  104/66 - 150/74 with Normal response to exercise reaching 112/72- 142/84.  NSR on tele with PAC, PVC observed.  RHR 74 - 91  with Normal response to exercise reaching 92 - 101.    No cardiac complaints.  Patient attends group educational classes on cardiac risk factor modification.   Her exercise program will be progressed as tolerated to maintain RPE 4-6.  They will continue to be educated on lifestyle modification and encouraged to supplement with a home exercise program as tolerated.    6/13/2024 Completed initial evaluation. Explained cardiac rehabilitation, cardiac risk factors, how the heart functions, Ejection fraction, heart healthy diet, RPE scale, angina, diabetes and exercise. Completed sub max treadmill test. Telemetry monitor NSR at rest and with exercise. Exercise MET level 1.77 RPE 6. Complained of fatigue and SOBOEl. Denied any complaint of chest discomfort. Provided her with handbook for cardiac rehabilitation and low sodium recipes. Pt stated goals included:attend cardiac rehabilitation regularly, increase endurance and strength to play with grandchildren without fatigue. Walk a longer distance without fatigue and shortness of breath with minimal exertion, decrease weight with increase activity, smaller food portions and reading labels for sodium, fat and carbohydrates.         Dx:   Encounter Diagnosis   Name Primary?    Chronic stable angina Yes       Description of Diagnosis: She has shortness of breath with  exertion along with some chest tightness at times. She has chronic LE Edema.     Date of onset: 3/26/2024    Other Cardiac History: she has a history of coronary artery disease with previous multivessel PCI in 2012 and 2013 (PCI mLAD and D1 stents - 1/9/2013 + PCI: pRCA, mRCA, PDA - 5/29/2012 .  She denies any history of myocardial infarction.  She was feeling fatigue and weakness prior to stent placement which improved after her stents were complete.  Nuclear stress test was done which did not show any ischemia or infarction.  Ejection fraction was 80%  Echocardiogram showed ejection fraction of 60% with moderate LA dilation and mild valve disease. CAD, HTN, MI, DMII, HLD        ASSESSMENT    Medical History:   Past Medical History:   Diagnosis Date    Coronary artery disease     pt has 6 stents total-2012 x1, 5/29/12-x3, 1/9/13 x2    Diabetes mellitus (HCC)     GERD (gastroesophageal reflux disease)     High cholesterol     Hypertension     Myocardial infarction (HCC) 03/2012    x1 stent    Shortness of breath     with exertion       Family History:  Family History   Problem Relation Age of Onset    Diabetes Mother     Arthritis Mother     Endometriosis Sister     Cancer Maternal Grandmother         uterine or ovarian - not sure       Allergies:   Prasugrel    Current Medications:   Current Outpatient Medications   Medication Sig Dispense Refill    albuterol (ProAir HFA) 90 mcg/act inhaler Inhale 2 puffs every 4 (four) hours as needed for wheezing or shortness of breath 8.5 g 7    amLODIPine (NORVASC) 5 mg tablet Take 1 tablet (5 mg total) by mouth daily 90 tablet 0    ASPIRIN 81 PO Take 81 mg by mouth every morning      atorvastatin (LIPITOR) 80 mg tablet Take 1 tablet (80 mg total) by mouth daily 90 tablet 1    Calcium Carbonate (CALTRATE 600 PO) Take by mouth in the morning      carvedilol (COREG) 12.5 mg tablet Take 1 tablet (12.5 mg total) by mouth 2 (two) times a day with meals 180 tablet 1     clotrimazole-betamethasone (LOTRISONE) 1-0.05 % cream Apply topically 2 (two) times a day 45 g 2    esomeprazole (NexIUM) 40 MG capsule Take 40 mg by mouth every morning      estradiol (ESTRACE VAGINAL) 0.1 mg/g vaginal cream Apply pea size amount around the opening of the vagina daily x 14 days, followed by twice weekly. 42.5 g 1    meclizine (ANTIVERT) 12.5 MG tablet Take 1 tablet (12.5 mg total) by mouth 3 (three) times a day as needed for dizziness 30 tablet 0    metFORMIN (GLUCOPHAGE-XR) 500 mg 24 hr tablet Take 2 tablets (1,000 mg total) by mouth 2 (two) times a day with meals 360 tablet 1    ramipril (ALTACE) 10 MG capsule Take 1 capsule (10 mg total) by mouth daily at bedtime 90 capsule 1    Triprolidine-Pseudoephedrine (ANTIHISTAMINE PO) Take by mouth daily at bedtime      Vitamin D, Cholecalciferol, 25 MCG (1000 UT) TABS Take by mouth 2 (two) times a day       No current facility-administered medications for this visit.       Medication compliance: Yes   Comments: Pt reports to be compliant with medications    Physical Limitations: Shortness of breath, Chronic chest discomfort, right knee discomfort    Fall Risk: Moderate   Comments: Reports a fall in the past 6 months    Cultural needs: none      CAD Risk Factors:  Cholesterol: Yes  HTN: Yes  DM: Type 2   PCP monitors A1c not required to monitor home BG  No insulin  Obesity: Yes   Inactivity: Yes      EXERCISE ASSESSMENT:     Initial Fitness Assessment: Submaximal TM ETT:  Resting:  BP: 122/70  HR: 80, Exercise:  BP: 136/78  HR: 100, METs:  1.77, ECG Summary: NSR, Symptoms: SOBOE, fatigue, and Test terminated at:  RPE 6      Functional Status Prior to Diagnosis for Treatment:   Occupation: retired  Recreation/Physical Activity: watch TV, Knitting, bike riding (3 years ago)  ADL’s: able to perform self-care  Cross: able to perform self-care  Home exercise equipment:  stationary bike and outdoor bike  Home exercise: none  Other: PT for right  "knee    Current Functional Status:  Occupation: retired  Recreation/Physical Activity: watch TV, sedentary  ADL’s:able to perform self-care  [place]: able to perform self-care  Home exercise: none  Other: PT for right knee    Functional Capacity Screening Tool:  [facility] Activity Status Index:  6.36 METs PRE    5.38 MET's POST      PSYCHOSOCIAL ASSESSMENT:    Depression screening:  PHQ-9 = 13  PRE         10 POST  Interpretation:  10-14 = Moderate Depression  Anxiety screening:  SCOOTER-7 = 0     PRE  2 POST  Interpretation: 0-4  = Not anxious    Pt self-report of depression and anxiety   Patient reports they are coping well with good social support and denies depression or anxiety  Reports sufficient emotional support   encouraged Pt to speak to PCP about their emotional health concerns  She is not interested in intervention of PHQ9 of 13 PRE 10 POST.       Self-reported stress level:   0   Stressors:  denies any stress  Stress Management Tools: exercise, keep a positive mindset, and TV    Quality of Life Screen:  (Higher score indicates disease impact on QOL)  [facility] COOP score: 26/40   PRE   28 POST     Social Support:   significant other  Community/Social Activities: none     Psychosocial Assessment as it relates to rehabilitation:   Patient denies issues with his/her family or home life that may affect their rehabilitation efforts.       NUTRITION ASSESSMENT:    Weight:    Wt Readings from Last 1 Encounters:   07/19/24 94.8 kg (209 lb)        Height:   Ht Readings from Last 1 Encounters:   07/19/24 5' 4.5\" (1.638 m)       Rate Your Plate Score: 53/81    Diabetes: T2D, Pt not required to monitor home blood sugar, no insulin  A1c: 7.6    last measured: 11/3/2023    Lipid management: Discussed diet and lipid management and Last lipid profile 4/20/2023  Chol 141    HDL 45  LDL 70    Current Dietary Habits:  She stated she will decrease food portions and read labels for sodium, fat and " carbohydrates    Drug/Alcohol Use:   Yes, 0-2 a week       OTHER CORE COMPONENT ASSESSMENT:    Tobacco Use:     Pt quit 2012   and has abstained    Anginal Symptoms:  LOCKETT and fatigue   NTG use: No prescription        INDIVIDUALIZED TREATMENT PLAN            EXERCISE GOAL and PLAN      SMART Goals:   10% improvement in functional capacity based on max METs achieved in initial fitness assessment  reduced dyspnea with physical activity    improved DASI score by 10%  maintain > 150 minutes per week of moderate intensity exercise    Patient Specific EXERCISE GOALS:       attend rehab regularly, decrease sitting time, start a home exercise program, and increase endurance to play with grandchildren and walk a longer distance without fatigue and shortness of breath, return to using bikes at home (met goals)    Progress toward SMART and personal Exercise goals:  She stated she noticed a slight increase in energy and endurance.She stated she is walking a little longer and further than when she started rehab.  She completes 2.1-2.8 MET's . She stated she is napping less and noticed an increase in energy.     Plan for next 30 days:     complete 30 to 40 minutes of aerobic activity, increase time and resistance as tolerated  use bike at home, consider joining the fitness center.     The patient was counseled on exercise guidelines to achieve a minimum of 150 mins/wk of moderate intensity (RPE 4-6)   exercise and encouraged to add 1-2 days of exercise on opposite days of cardiac rehab as tolerated.     Current Aerobic Exercise Prescription:      Frequency: 3 days/week   Supplement with home exercise 2+ days/wk as tolerated       Minutes: 30-40         METS: 2.1-2.8          HR:     RPE: 4-6         Modalities: UBE, NuStep, Recumbent bike, and Room walking     Exercise workloads will be progressed gradually as tolerated, within limits of patient's ability, and according to the patient's   response to the exercise  program.      Aerobic Exercise Prescription - 30 day goal:   Frequency: 3-5 days/week          Minutes: 30-40    >150 mins/wk of moderate intensity exercise   METS: 2.1-3.5   HR: 100-120     RPE: 4-6   Modalities: UBE, NuStep, Recumbent bike, and Room walking    Strength trainin-3 days / week  12-15 repetitions  1-2 sets per modality   Will be added following at least 8 weeks post surgery and 8-10 monitored sessions   Modalities: Pull Downs, Arm Curl, Sit to Stands, Upright Rows, Front Raises, and Shoulder Shrugs    Home Exercise: none, she stated she will start using stationary bike at home 30 minutes and will consider joining the fitness center in January    Group and Individual Education: benefit of exercise for CAD risk factors, AHA guidelines to achieve >150 mins/wk of moderate exercise, and RPE scale     Readiness to change: Contemplation:  (Acknowledging that there is a problem but not yet ready or sure of wanting to make a change)      NUTRITION GOALS AND PLAN    Weight control:    Starting weight: 210   Current weight: 209    Nutritional   Reviewed patient's Rate your Plate. Discussed key elements of heart healthy eating. Reviewed patient goals for dietary modifications and their clinical implications.  Reviewed most recent lipid profile.     SMART Goals:   2.5-5%  wt loss    Patient Specific NUTRITION GOALS:     Decrease food portions, read food labels    Patient's progress toward SMART and personal Nutrition goals:   Patient stated she has not made any dietary changes in the last 30 days. She stated she is eating fruits and vegetables that are currently in season.       Plan for next 30 days:   Decrease food portions, decrease consumption of sodium, fat and carbohydrates    Measurable goals were based Rate Your Plate Dietary Self-Assessment. These are the areas in which the patient could score higher on the assessment.  Goals include recommendations for a heart healthy diet based on American Heart  Association.    Group and Individual Education:   exercise and diabetes management   group class: Heart Healthy Eating  effects of exercise and diet on glycemic control    Readiness to change: Pre-Contemplation:   (Not yet acknowledging that there is a problem behavior that needs to change)      PSYCHOSOCIAL ASSESSMENT AND PLAN    Psychosocial Assessment as it relates to rehabilitation:   Patient denies issues with his/her family or home life that may affect their rehabilitation efforts.     SMART Goals:     PHQ-9 - reduced severity by one level, improved sleeping habits, and feel less tired with more energy    Patient Specific PSYCHOCOSOCIAL GOALS:      Encourage patient to use [facility] program, she is not interested in medical intervention for PHQ9 of 10 at this time    Patient's progress toward SMART and personal Psychosocial goals:   She is not interested in medical intervention for emotional health at this time, discussed relaxation breathing, guided imagery and silver [employer] program    Plan for next 30 days:   Practice relaxation techniques, Exercise, Keep a positive mindset, and Repeat PHQ-9 every 30 days if score >5    Group and Individual Education: benefits of a positive support system, benefits of enrolling in [facility] [employer], depression and CAD, and benefits of mental health counseling    Information to utilize [employer] was provided as well as contact information for counseling through  Behavioral [facility] and group psychotherapy groups available.    Readiness to change: Pre-Contemplation:   (Not yet acknowledging that there is a problem behavior that needs to change)      OTHER CORE COMPONENTS GOALS and PLAN      Blood Pressure will be monitored throughout the program and cardiologist will be notified of elevated trends.    Pt will be encouraged to monitor home BP if advised by cardiologist.    Tobacco Intervention:   Pt quit 2012 and has abstained since quitting.      SMART Goals:   consistent,  controlled resting BP < 130/80 and medication compliance    Patient Specific CORE COMPONENT GOALS:    medication compliance and decrease sodium in diet    Progress toward SMART and personal Core Component goals:    She will start reading food labels for sodium content, continue to take her medication as prescribed    Plan for next 30 days:   medication compliance, engage in regular exercise, and check labels for sodium content    Group and Individual Education:  understanding high blood pressure and it's relationship to CAD    Readiness to change: Preparation:  (Getting ready to change)

## 2024-09-06 ENCOUNTER — APPOINTMENT (OUTPATIENT)
Dept: CARDIAC REHAB | Facility: CLINIC | Age: 79
End: 2024-09-06
Payer: MEDICARE

## 2024-09-09 ENCOUNTER — APPOINTMENT (OUTPATIENT)
Dept: CARDIAC REHAB | Facility: CLINIC | Age: 79
End: 2024-09-09
Payer: MEDICARE

## 2024-09-25 DIAGNOSIS — I10 PRIMARY HYPERTENSION: ICD-10-CM

## 2024-09-25 RX ORDER — AMLODIPINE BESYLATE 5 MG/1
5 TABLET ORAL DAILY
Qty: 90 TABLET | Refills: 1 | Status: SHIPPED | OUTPATIENT
Start: 2024-09-25 | End: 2025-09-20

## 2024-10-28 ENCOUNTER — RA CDI HCC (OUTPATIENT)
Dept: OTHER | Facility: HOSPITAL | Age: 79
End: 2024-10-28

## 2024-10-30 ENCOUNTER — VBI (OUTPATIENT)
Dept: ADMINISTRATIVE | Facility: OTHER | Age: 79
End: 2024-10-30

## 2024-10-30 NOTE — TELEPHONE ENCOUNTER
10/30/24 1:33 PM    Patient contacted to bring Advance Directive, POLST, or Living Will document to next scheduled pcp visit.[facility] Department was unable to leave a message; no answer/ line busy.    Thank you.  Leatha Christensen MA  [facility]

## 2024-11-02 ENCOUNTER — APPOINTMENT (OUTPATIENT)
Dept: LAB | Facility: HOSPITAL | Age: 79
End: 2024-11-02
Payer: MEDICARE

## 2024-11-02 DIAGNOSIS — E11.22 TYPE 2 DIABETES MELLITUS WITH STAGE 2 CHRONIC KIDNEY DISEASE, WITHOUT LONG-TERM CURRENT USE OF INSULIN  (HCC): ICD-10-CM

## 2024-11-02 DIAGNOSIS — E78.5 DYSLIPIDEMIA: ICD-10-CM

## 2024-11-02 DIAGNOSIS — N18.2 TYPE 2 DIABETES MELLITUS WITH STAGE 2 CHRONIC KIDNEY DISEASE, WITHOUT LONG-TERM CURRENT USE OF INSULIN  (HCC): ICD-10-CM

## 2024-11-02 LAB
ALBUMIN SERPL BCG-MCNC: 4.2 G/DL (ref 3.5–5)
ALP SERPL-CCNC: 72 U/L (ref 34–104)
ALT SERPL W P-5'-P-CCNC: 23 U/L (ref 7–52)
ANION GAP SERPL CALCULATED.3IONS-SCNC: 8 MMOL/L (ref 4–13)
AST SERPL W P-5'-P-CCNC: 17 U/L (ref 13–39)
BILIRUB SERPL-MCNC: 0.56 MG/DL (ref 0.2–1)
BUN SERPL-MCNC: 16 MG/DL (ref 5–25)
CALCIUM SERPL-MCNC: 9.1 MG/DL (ref 8.4–10.2)
CHLORIDE SERPL-SCNC: 104 MMOL/L (ref 96–108)
CHOLEST SERPL-MCNC: 131 MG/DL
CO2 SERPL-SCNC: 29 MMOL/L (ref 21–32)
CREAT SERPL-MCNC: 0.82 MG/DL (ref 0.6–1.3)
EST. AVERAGE GLUCOSE BLD GHB EST-MCNC: 169 MG/DL
GFR SERPL CREATININE-BSD FRML MDRD: 69 ML/MIN/1.73SQ M
GLUCOSE P FAST SERPL-MCNC: 170 MG/DL (ref 65–99)
HBA1C MFR BLD: 7.5 %
HDLC SERPL-MCNC: 45 MG/DL
LDLC SERPL CALC-MCNC: 62 MG/DL (ref 0–100)
POTASSIUM SERPL-SCNC: 3.9 MMOL/L (ref 3.5–5.3)
PROT SERPL-MCNC: 7.1 G/DL (ref 6.4–8.4)
SODIUM SERPL-SCNC: 141 MMOL/L (ref 135–147)
TRIGL SERPL-MCNC: 119 MG/DL

## 2024-11-02 PROCEDURE — 36415 COLL VENOUS BLD VENIPUNCTURE: CPT

## 2024-11-02 PROCEDURE — 80061 LIPID PANEL: CPT

## 2024-11-02 PROCEDURE — 80053 COMPREHEN METABOLIC PANEL: CPT

## 2024-11-02 PROCEDURE — 83036 HEMOGLOBIN GLYCOSYLATED A1C: CPT

## 2024-11-04 ENCOUNTER — OFFICE VISIT (OUTPATIENT)
Dept: FAMILY MEDICINE CLINIC | Facility: CLINIC | Age: 79
End: 2024-11-04
Payer: MEDICARE

## 2024-11-04 VITALS
BODY MASS INDEX: 34.99 KG/M2 | HEIGHT: 65 IN | DIASTOLIC BLOOD PRESSURE: 66 MMHG | WEIGHT: 210 LBS | HEART RATE: 72 BPM | RESPIRATION RATE: 16 BRPM | TEMPERATURE: 97.1 F | SYSTOLIC BLOOD PRESSURE: 126 MMHG

## 2024-11-04 DIAGNOSIS — Z13.0 SCREENING FOR DEFICIENCY ANEMIA: ICD-10-CM

## 2024-11-04 DIAGNOSIS — Z00.00 MEDICARE ANNUAL WELLNESS VISIT, SUBSEQUENT: ICD-10-CM

## 2024-11-04 DIAGNOSIS — N18.2 TYPE 2 DIABETES MELLITUS WITH STAGE 2 CHRONIC KIDNEY DISEASE, WITHOUT LONG-TERM CURRENT USE OF INSULIN  (HCC): Primary | ICD-10-CM

## 2024-11-04 DIAGNOSIS — I10 PRIMARY HYPERTENSION: ICD-10-CM

## 2024-11-04 DIAGNOSIS — Z13.29 SCREENING FOR THYROID DISORDER: ICD-10-CM

## 2024-11-04 DIAGNOSIS — E11.22 TYPE 2 DIABETES MELLITUS WITH STAGE 2 CHRONIC KIDNEY DISEASE, WITHOUT LONG-TERM CURRENT USE OF INSULIN  (HCC): Primary | ICD-10-CM

## 2024-11-04 DIAGNOSIS — E78.5 DYSLIPIDEMIA: ICD-10-CM

## 2024-11-04 DIAGNOSIS — I25.10 CORONARY ARTERY DISEASE INVOLVING NATIVE CORONARY ARTERY OF NATIVE HEART WITHOUT ANGINA PECTORIS: ICD-10-CM

## 2024-11-04 DIAGNOSIS — Z23 ENCOUNTER FOR IMMUNIZATION: ICD-10-CM

## 2024-11-04 PROBLEM — N81.6 RECTOCELE: Status: ACTIVE | Noted: 2024-11-04

## 2024-11-04 PROBLEM — N39.46 MIXED STRESS AND URGE URINARY INCONTINENCE: Status: ACTIVE | Noted: 2024-11-04

## 2024-11-04 PROCEDURE — 99214 OFFICE O/P EST MOD 30 MIN: CPT | Performed by: NURSE PRACTITIONER

## 2024-11-04 PROCEDURE — G0008 ADMIN INFLUENZA VIRUS VAC: HCPCS | Performed by: NURSE PRACTITIONER

## 2024-11-04 PROCEDURE — 90662 IIV NO PRSV INCREASED AG IM: CPT | Performed by: NURSE PRACTITIONER

## 2024-11-04 PROCEDURE — G0439 PPPS, SUBSEQ VISIT: HCPCS | Performed by: NURSE PRACTITIONER

## 2024-11-04 NOTE — PROGRESS NOTES
Ambulatory Visit  Name: Meka See      : 1945      MRN: [id number]  Encounter Provider: DAVID Jones  Encounter Date: 2024   [facility]    Assessment & Plan  Type 2 diabetes mellitus with stage 2 chronic kidney disease, without long-term current use of insulin  (HCC)  Will continue with current regimen and advised on diet/exercise and weight loss  Lab Results   Component Value Date    HGBA1C 7.5 (H) 2024       Orders:    Comprehensive metabolic panel; Future    Hemoglobin A1C; Future    Albumin / creatinine urine ratio    Encounter for immunization    Orders:    influenza vaccine, high-dose, PF 0.5 mL (Fluzone High Dose)    Screening for deficiency anemia    Orders:    CBC; Future    Screening for thyroid disorder    Orders:    TSH, 3rd generation with Free T4 reflex; Future    Primary hypertension  Stable with current regimen         Dyslipidemia  Complaint with statin and tolerating it well         Medicare annual wellness visit, subsequent         Coronary artery disease involving native coronary artery of native heart without angina pectoris  Managed by cardiologist            Preventive health issues were discussed with patient, and age appropriate screening tests were ordered as noted in patient's After Visit Summary. Personalized health advice and appropriate referrals for health education or preventive services given if needed, as noted in patient's After Visit Summary.    History of Present Illness     Patient is here to follow up on chronic conditions and AWV.  Denies any concerns and complains.  Complaint with medications and tolerating it well.  Blood work discussed and reviewed.  Due for CT lung screening and has ordered and counseled and will schedule that.         Patient Care Team:  DAVID Jones as PCP - General (Family Medicine)    Review of Systems   HENT: Negative.     Respiratory: Negative.     Cardiovascular: Negative.     Gastrointestinal: Negative.    Neurological: Negative.      Medical History Reviewed by provider this encounter:  Tobacco  Allergies  Meds  Problems  Med Hx  Surg Hx  Fam Hx       Annual Wellness Visit Questionnaire   Meka is here for her Subsequent Wellness visit.     Health Risk Assessment:   Patient rates overall health as good. Patient feels that their physical health rating is same. Patient is satisfied with their life. Eyesight was rated as slightly worse. Hearing was rated as same. Patient feels that their emotional and mental health rating is same. Patients states they are never, rarely angry. Patient states they are always unusually tired/fatigued. Pain experienced in the last 7 days has been a lot. Patient's pain rating has been 5/10. Patient states that she has experienced no weight loss or gain in last 6 months.     Fall Risk Screening:   In the past year, patient has experienced: history of falling in past year    Number of falls: 1  Injured during fall?: Yes    Feels unsteady when standing or walking?: Yes    Worried about falling?: Yes      Urinary Incontinence Screening:   Patient has leaked urine accidently in the last six months.     Home Safety:  Patient has trouble with stairs inside or outside of their home. Patient has working smoke alarms and has working carbon monoxide detector. Home safety hazards include: none.     Nutrition:   Current diet is Regular.     Medications:   Patient is currently taking over-the-counter supplements. OTC medications include: see medication list. Patient is able to manage medications.     Activities of Daily Living (ADLs)/Instrumental Activities of Daily Living (IADLs):   Walk and transfer into and out of bed and chair?: Yes  Dress and groom yourself?: Yes    Bathe or shower yourself?: Yes    Feed yourself? Yes  Do your laundry/housekeeping?: Yes  Manage your money, pay your bills and track your expenses?: Yes  Make your own meals?: Yes    Do your own  shopping?: Yes    Previous Hospitalizations:   Any hospitalizations or ED visits within the last 12 months?: Yes    How many hospitalizations have you had in the last year?: 1-2    Advance Care Planning:   Living will: Yes    Durable POA for healthcare: Yes    Advanced directive: Yes    Advanced directive counseling given: Yes    Five wishes given: No    Patient declined ACP directive: No      Cognitive Screening:   Provider or family/friend/caregiver concerned regarding cognition?: No    PREVENTIVE SCREENINGS      Cardiovascular Screening:    General: Screening Current      Diabetes Screening:     General: History Diabetes and Screening Current      Colorectal Cancer Screening:     General: Screening Current      Breast Cancer Screening:     General: Screening Current      Cervical Cancer Screening:    General: Screening Not Indicated      Osteoporosis Screening:    General: Screening Current      Abdominal Aortic Aneurysm (AAA) Screening:        General: Screening Not Indicated      Lung Cancer Screening:     General: Risks and Benefits Discussed    Due for: Low Dose CT (LDCT)      Hepatitis C Screening:    General: Screening Current    Screening, Brief Intervention, and Referral to Treatment (SBIRT)    Screening  Typical number of drinks in a day: 0  Typical number of drinks in a week: 0  Interpretation: Low risk drinking behavior.    Single Item Drug Screening:  How often have you used an illegal drug (including marijuana) or a prescription medication for non-medical reasons in the past year? never    Single Item Drug Screen Score: 0  Interpretation: Negative screen for possible drug use disorder    Brief Intervention  Alcohol & drug use screenings were reviewed. No concerns regarding substance use disorder identified.     Other Counseling Topics:   Car/seat belt/driving safety, skin self-exam, sunscreen and calcium and vitamin D intake and regular weightbearing exercise.     Social Determinants of Health  "    Financial Resource Strain: Low Risk  (11/3/2023)    Overall Financial Resource Strain (CARDIA)     Difficulty of Paying Living Expenses: Not hard at all   Food Insecurity: No Food Insecurity (11/4/2024)    Hunger Vital Sign     Worried About Running Out of Food in the Last Year: Never true     Ran Out of Food in the Last Year: Never true   Transportation Needs: No Transportation Needs (11/4/2024)    PRAPARE - Transportation     Lack of Transportation (Medical): No     Lack of Transportation (Non-Medical): No   Housing Stability: Low Risk  (11/4/2024)    Housing Stability Vital Sign     Unable to Pay for Housing in the Last Year: No     Number of Times Moved in the Last Year: 0     Homeless in the Last Year: No   Utilities: Not At Risk (11/4/2024)    [facility] Utilities     Threatened with loss of utilities: No     No results found.    Objective     /66   Pulse 72   Temp (!) 97.1 °F (36.2 °C)   Resp 16   Ht 5' 4.5\" (1.638 m)   Wt 95.3 kg (210 lb)   BMI 35.49 kg/m²     Physical Exam  HENT:      Head: Normocephalic.      Right Ear: External ear normal.      Left Ear: External ear normal.      Nose: Nose normal.   Eyes:      Conjunctiva/sclera: Conjunctivae normal.   Cardiovascular:      Rate and Rhythm: Normal rate and regular rhythm.      Heart sounds: Normal heart sounds.   Pulmonary:      Effort: Pulmonary effort is normal.      Breath sounds: Normal breath sounds.   Musculoskeletal:      Cervical back: Normal range of motion.   Skin:     General: Skin is warm and dry.      Findings: No rash.   Neurological:      Mental Status: She is alert and oriented to person, place, and time.   Psychiatric:         Mood and Affect: Mood normal.         Behavior: Behavior normal.         Thought Content: Thought content normal.         Judgment: Judgment normal.         "

## 2024-11-04 NOTE — PATIENT INSTRUCTIONS

## 2024-11-04 NOTE — ASSESSMENT & PLAN NOTE
Will continue with current regimen and advised on diet/exercise and weight loss  Lab Results   Component Value Date    HGBA1C 7.5 (H) 11/02/2024       Orders:    Comprehensive metabolic panel; Future    Hemoglobin A1C; Future    Albumin / creatinine urine ratio

## 2024-11-06 LAB
LEFT EYE DIABETIC RETINOPATHY: NORMAL
RIGHT EYE DIABETIC RETINOPATHY: NORMAL
SEVERITY (EYE EXAM): NORMAL

## 2024-12-07 NOTE — ANESTHESIA POSTPROCEDURE EVALUATION
[facility] Post-Op Assessment Note    CV Status:  Stable  Pain Score: 0    Pain management: adequate     Mental Status:  Alert and awake   Hydration Status:  Euvolemic   PONV Controlled:  Controlled   Airway Patency:  Patent      Post Op Vitals Reviewed: Yes      Staff: Anesthesiologist, CRNA         No complications documented      BP      Temp     Pulse     Resp      SpO2

## 2024-12-26 ENCOUNTER — HOSPITAL ENCOUNTER (OUTPATIENT)
Dept: RADIOLOGY | Facility: HOSPITAL | Age: 79
Discharge: HOME | End: 2024-12-26
Payer: MEDICARE

## 2024-12-26 ENCOUNTER — RESULTS FOLLOW-UP (OUTPATIENT)
Dept: FAMILY MEDICINE CLINIC | Facility: CLINIC | Age: 79
End: 2024-12-26

## 2024-12-26 DIAGNOSIS — R92.8 ABNORMAL MAMMOGRAM OF LEFT BREAST: ICD-10-CM

## 2024-12-26 DIAGNOSIS — N63.20 MASS OF LEFT BREAST, UNSPECIFIED QUADRANT: ICD-10-CM

## 2024-12-26 PROCEDURE — 76642 ULTRASOUND BREAST LIMITED: CPT

## 2024-12-26 PROCEDURE — 77065 DX MAMMO INCL CAD UNI: CPT

## 2024-12-26 PROCEDURE — G0279 TOMOSYNTHESIS, MAMMO: HCPCS

## 2025-01-06 NOTE — PROGRESS NOTES
Cardiology   Domenico Waller DO, [facility]  Nehemiah Lares MD, [facility]  Michael Watts MD, [facility]  Madalyn Garcia MD, [facility]  -------------------------------------------------------------------  [facility]  [address], [address], [address]  [place], [place], [address]  [phone]    Cardiology Follow Up  Meka See  1945  [id number]          Assessment/Plan:    1. Coronary artery disease involving native coronary artery of native heart without angina pectoris    2. Primary hypertension    3. Dyslipidemia    4. Angina of effort    5. Other type of myocardial infarction (HCC)      -  No ischemia or infarction present on last stress test.  Ejection fraction was normal on echocardiogram.  She had diastolic dysfunction.  - Discussed risk factor reduction including refraining from smoking, eating a diet high in fruits and vegetables, maintaining a healthy weight, limiting screen time along with controlling BP and cholesterol.  Encouraged to exercise 150 minutes a week at a moderate level such as a fast walk or 75 minutes of high intensity.  - Jardiance was ordered last visit but she could not take it because it was expensive.   - continue atorvastatin 80 mg daily.  Blood work in April 2023 showed LDL of 70 and HDL of 45.  Repeat lipid panel ordered.    - continue carvedilol 12.5 mg b.i.d. And ramipril 10 mg daily.  - referral made to cardiac rehab         Interval History:     Meka See is 78 y.o. female here for followup of CAD.     Since her last visit, she has been feeling well.  she denies any palpitations, orthopnea or PND.   She has shortness of breath with exertion along with some chest tightness at times.  She has chronic LE Edema.  No improvement with furosemide prescribed last visit.    Diet is overall unchanged.  There has not been a significant change in weight.     She had a stress test and echocardiogram done in May 2022.  There was no ischemia  Clinically appears to be volume overloaded.  Current recorded weight of 173 pounds, continue with IV Lasix currently on 40 mg twice daily.   seen on stress test and echocardiogram showed normal ejection fraction with mild valve disease.  There was grade 1 diastolic dysfunction.    She has a history of coronary artery disease with previous multivessel PCI in 2012 and 2013 (PCI mLAD and D1 stents - 1/9/2013 + PCI: pRCA, mRCA, PDA - 5/29/2012 .  She denies any history of myocardial infarction.  She was feeling fatigue and weakness prior to stent placement which improved after her stents were complete.  Nuclear stress test was done which did not show any ischemia or infarction.  Ejection fraction was 80%  Echocardiogram showed ejection fraction of 60% with moderate LA dilation and mild valve disease.       The following portions of the patient's history were reviewed and updated as appropriate: allergies, current medications, past family history, past medical history, past social history, past surgical history, and problem list.       Current Outpatient Medications:     albuterol (ProAir HFA) 90 mcg/act inhaler, Inhale 2 puffs every 4 (four) hours as needed for wheezing or shortness of breath, Disp: 8.5 g, Rfl: 7    amLODIPine (NORVASC) 5 mg tablet, Take 1 tablet (5 mg total) by mouth daily, Disp: 90 tablet, Rfl: 3    ASPIRIN 81 PO, Take 81 mg by mouth every morning, Disp: , Rfl:     atorvastatin (LIPITOR) 80 mg tablet, Take 1 tablet (80 mg total) by mouth daily, Disp: 90 tablet, Rfl: 3    Calcium Carbonate (CALTRATE 600 PO), Take by mouth in the morning, Disp: , Rfl:     carvedilol (COREG) 12.5 mg tablet, Take 1 tablet (12.5 mg total) by mouth 2 (two) times a day with meals, Disp: 180 tablet, Rfl: 3    esomeprazole (NexIUM) 40 MG capsule, Take 40 mg by mouth every morning, Disp: , Rfl:     estradiol (ESTRACE VAGINAL) 0.1 mg/g vaginal cream, Apply pea size amount around the opening of the vagina daily x 14 days, followed by twice weekly., Disp: 42.5 g, Rfl: 1    meclizine (ANTIVERT) 12.5 MG tablet, Take 1 tablet (12.5 mg total) by mouth 3 (three) times a day  "as needed for dizziness, Disp: 30 tablet, Rfl: 0    metFORMIN (GLUCOPHAGE-XR) 500 mg 24 hr tablet, Take 2 tablets (1,000 mg total) by mouth 2 (two) times a day with meals, Disp: 360 tablet, Rfl: 1    ramipril (ALTACE) 10 MG capsule, Take 1 capsule (10 mg total) by mouth daily at bedtime, Disp: 90 capsule, Rfl: 3    Triprolidine-Pseudoephedrine (ANTIHISTAMINE PO), Take by mouth daily at bedtime, Disp: , Rfl:         Review of Systems:  Review of Systems   Respiratory:  Positive for shortness of breath.    Cardiovascular:  Positive for chest pain and leg swelling. Negative for palpitations.   Musculoskeletal:  Positive for arthralgias.   All other systems reviewed and are negative.        Physical Exam:  Vitals:  Vitals:    03/26/24 1018   BP: 130/70   BP Location: Left arm   Patient Position: Sitting   Cuff Size: Large   Pulse: 76   SpO2: 97%   Weight: 93.9 kg (207 lb)   Height: 5' 4.5\" (1.638 m)     Physical Exam   Constitutional: She appears healthy. No distress.   Eyes: Pupils are equal, round, and reactive to light. Conjunctivae are normal.   Neck: No JVD present.   Cardiovascular: Normal rate, regular rhythm and normal heart sounds. Exam reveals no gallop and no friction rub.   No murmur heard.  Pulmonary/Chest: Effort normal and breath sounds normal. She has no wheezes. She has no rales.   Musculoskeletal:         General: Edema present. No tenderness or deformity.      Cervical back: Normal range of motion and neck supple.   Neurological: She is alert and oriented to person, place, and time.   Skin: Skin is warm and dry.        Cardiographics:  EKG: Personally reviewed NSR with LVH  Last known EF: 55%    This note was completed in part utilizing M-Modal Fluency Direct Software.  Grammatical errors, random word insertions, spelling mistakes, and incomplete sentences can be an occasional consequence of this system secondary to software limitations, ambient noise, and hardware issues.  If you have any questions " or concerns about the content, text, or information contained within the body of this dictation, please contact the provider for clarification.

## 2025-02-23 DIAGNOSIS — I25.10 CORONARY ARTERY DISEASE INVOLVING NATIVE CORONARY ARTERY OF NATIVE HEART WITHOUT ANGINA PECTORIS: ICD-10-CM

## 2025-02-24 RX ORDER — CARVEDILOL 12.5 MG/1
12.5 TABLET ORAL 2 TIMES DAILY WITH MEALS
Qty: 180 TABLET | Refills: 1 | Status: SHIPPED | OUTPATIENT
Start: 2025-02-24

## 2025-03-13 DIAGNOSIS — I25.10 CORONARY ARTERY DISEASE INVOLVING NATIVE CORONARY ARTERY OF NATIVE HEART WITHOUT ANGINA PECTORIS: ICD-10-CM

## 2025-03-13 RX ORDER — RAMIPRIL 10 MG/1
10 CAPSULE ORAL
Qty: 30 CAPSULE | Refills: 0 | Status: SHIPPED | OUTPATIENT
Start: 2025-03-13 | End: 2026-03-08

## 2025-03-18 DIAGNOSIS — E11.22 TYPE 2 DIABETES MELLITUS WITH STAGE 2 CHRONIC KIDNEY DISEASE, WITHOUT LONG-TERM CURRENT USE OF INSULIN  (HCC): ICD-10-CM

## 2025-03-18 DIAGNOSIS — N18.2 TYPE 2 DIABETES MELLITUS WITH STAGE 2 CHRONIC KIDNEY DISEASE, WITHOUT LONG-TERM CURRENT USE OF INSULIN  (HCC): ICD-10-CM

## 2025-03-18 RX ORDER — METFORMIN HYDROCHLORIDE 500 MG/1
1000 TABLET, EXTENDED RELEASE ORAL 2 TIMES DAILY WITH MEALS
Qty: 360 TABLET | Refills: 1 | Status: SHIPPED | OUTPATIENT
Start: 2025-03-18

## 2025-03-19 DIAGNOSIS — I10 PRIMARY HYPERTENSION: ICD-10-CM

## 2025-03-19 DIAGNOSIS — N95.2 VAGINAL ATROPHY: ICD-10-CM

## 2025-03-19 DIAGNOSIS — N94.10 FEMALE DYSPAREUNIA: ICD-10-CM

## 2025-03-20 RX ORDER — AMLODIPINE BESYLATE 5 MG/1
5 TABLET ORAL DAILY
Qty: 90 TABLET | Refills: 0 | Status: SHIPPED | OUTPATIENT
Start: 2025-03-20 | End: 2025-03-26 | Stop reason: SDUPTHER

## 2025-03-26 ENCOUNTER — OFFICE VISIT (OUTPATIENT)
Dept: CARDIOLOGY CLINIC | Facility: CLINIC | Age: 80
End: 2025-03-26
Payer: MEDICARE

## 2025-03-26 VITALS
OXYGEN SATURATION: 98 % | BODY MASS INDEX: 34.99 KG/M2 | SYSTOLIC BLOOD PRESSURE: 122 MMHG | HEART RATE: 77 BPM | HEIGHT: 65 IN | DIASTOLIC BLOOD PRESSURE: 80 MMHG | WEIGHT: 210 LBS

## 2025-03-26 DIAGNOSIS — R60.0 BILATERAL LEG EDEMA: ICD-10-CM

## 2025-03-26 DIAGNOSIS — I25.10 CORONARY ARTERY DISEASE INVOLVING NATIVE CORONARY ARTERY OF NATIVE HEART WITHOUT ANGINA PECTORIS: Primary | ICD-10-CM

## 2025-03-26 DIAGNOSIS — I50.32 DIASTOLIC DYSFUNCTION WITH CHRONIC HEART FAILURE (HCC): ICD-10-CM

## 2025-03-26 DIAGNOSIS — I10 PRIMARY HYPERTENSION: ICD-10-CM

## 2025-03-26 DIAGNOSIS — N18.2 TYPE 2 DIABETES MELLITUS WITH STAGE 2 CHRONIC KIDNEY DISEASE, WITHOUT LONG-TERM CURRENT USE OF INSULIN  (HCC): ICD-10-CM

## 2025-03-26 DIAGNOSIS — E11.22 TYPE 2 DIABETES MELLITUS WITH STAGE 2 CHRONIC KIDNEY DISEASE, WITHOUT LONG-TERM CURRENT USE OF INSULIN  (HCC): ICD-10-CM

## 2025-03-26 PROCEDURE — 99214 OFFICE O/P EST MOD 30 MIN: CPT | Performed by: INTERNAL MEDICINE

## 2025-03-26 PROCEDURE — 93000 ELECTROCARDIOGRAM COMPLETE: CPT | Performed by: INTERNAL MEDICINE

## 2025-03-26 RX ORDER — RAMIPRIL 10 MG/1
10 CAPSULE ORAL
Qty: 90 CAPSULE | Refills: 3 | Status: SHIPPED | OUTPATIENT
Start: 2025-03-26 | End: 2026-03-21

## 2025-03-26 RX ORDER — CARVEDILOL 12.5 MG/1
12.5 TABLET ORAL 2 TIMES DAILY WITH MEALS
Qty: 180 TABLET | Refills: 3 | Status: SHIPPED | OUTPATIENT
Start: 2025-03-26

## 2025-03-26 RX ORDER — TORSEMIDE 10 MG/1
10 TABLET ORAL DAILY
Qty: 30 TABLET | Refills: 0 | Status: SHIPPED | OUTPATIENT
Start: 2025-03-26

## 2025-03-26 RX ORDER — ATORVASTATIN CALCIUM 80 MG/1
80 TABLET, FILM COATED ORAL DAILY
Qty: 90 TABLET | Refills: 3 | Status: SHIPPED | OUTPATIENT
Start: 2025-03-26 | End: 2026-03-21

## 2025-03-26 RX ORDER — AMLODIPINE BESYLATE 5 MG/1
5 TABLET ORAL DAILY
Qty: 90 TABLET | Refills: 3 | Status: SHIPPED | OUTPATIENT
Start: 2025-03-26 | End: 2026-03-21

## 2025-03-26 NOTE — ASSESSMENT & PLAN NOTE
Wt [place] from Last 3 Encounters:   03/26/25 95.3 kg (210 lb)   11/04/24 95.3 kg (210 lb)   07/19/24 94.8 kg (209 lb)     - Trial of torsemide 10 mg daily.

## 2025-03-26 NOTE — PROGRESS NOTES
Cardiology   Domenico Waller DO, [facility]  Nehemiah Lares MD, [facility]  Michael Watts MD, [facility]  Madalyn Garcia MD, [facility]  -------------------------------------------------------------------  [facility]  [address], [address], [address]  [place], [place], [address]  [phone]    Cardiology Follow Up  Meka See  1945  [id number]          Assessment/Plan:    Assessment & Plan  Coronary artery disease involving native coronary artery of native heart without angina pectoris    -  No ischemia or infarction present on last stress test.  Ejection fraction was normal on echocardiogram.  She had diastolic dysfunction.    - Discussed risk factor reduction including refraining from smoking, eating a diet high in fruits and vegetables, maintaining a healthy weight, limiting screen time along with controlling BP and cholesterol.  Encouraged to exercise 150 minutes a week at a moderate level such as a fast walk or 75 minutes of high intensity.  Bilateral leg edema  - Will hold amlodipine for 2 weeks to assess for improvement.  If no improvement, begin trial of torsemide 10 mg daily.    - Will reassess in 4-6 weeks.   Primary hypertension  - BP controlled.   - Will reassess with discontinuation of amlodipine.   Diastolic dysfunction with chronic heart failure (HCC)  Wt [place]ings from Last 3 Encounters:   03/26/25 95.3 kg (210 lb)   11/04/24 95.3 kg (210 lb)   07/19/24 94.8 kg (209 lb)     - Trial of torsemide 10 mg daily.       Type 2 diabetes mellitus with stage 2 chronic kidney disease, without long-term current use of insulin  ([facility])    Lab Results   Component Value Date    HGBA1C 7.5 (H) 11/02/2024         Interval History:     Meka See is 79 y.o. female here for followup of CAD.     Since her last visit, she has been feeling well.  she denies any palpitations, orthopnea or PND.   She has shortness of breath with exertion along with some chest tightness at  times.  She has chronic LE Edema.  No improvement with furosemide prescribed in the past.   Diet is overall unchanged.  There has not been a significant change in weight.     She had a stress test and echocardiogram done in May 2022.  There was no ischemia seen on stress test and echocardiogram showed normal ejection fraction with mild valve disease.  There was grade 1 diastolic dysfunction.    She has a history of coronary artery disease with previous multivessel PCI in 2012 and 2013 (PCI mLAD and D1 stents - 1/9/2013 + PCI: pRCA, mRCA, PDA - 5/29/2012 .  She denies any history of myocardial infarction.  She was feeling fatigue and weakness prior to stent placement which improved after her stents were complete.  Nuclear stress test was done which did not show any ischemia or infarction.  Ejection fraction was 80%  Echocardiogram showed ejection fraction of 60% with moderate LA dilation and mild valve disease.       The following portions of the patient's history were reviewed and updated as appropriate: allergies, current medications, past family history, past medical history, past social history, past surgical history, and problem list.       Current Outpatient Medications:     amLODIPine (NORVASC) 5 mg tablet, Take 1 tablet (5 mg total) by mouth daily, Disp: 90 tablet, Rfl: 3    ASPIRIN 81 PO, Take 81 mg by mouth every morning, Disp: , Rfl:     atorvastatin (LIPITOR) 80 mg tablet, Take 1 tablet (80 mg total) by mouth daily, Disp: 90 tablet, Rfl: 3    Calcium Carbonate (CALTRATE 600 PO), Take by mouth in the morning, Disp: , Rfl:     carvedilol (COREG) 12.5 mg tablet, Take 1 tablet (12.5 mg total) by mouth 2 (two) times a day with meals, Disp: 180 tablet, Rfl: 3    esomeprazole (NexIUM) 40 MG capsule, Take 40 mg by mouth every morning, Disp: , Rfl:     estradiol (ESTRACE VAGINAL) 0.1 mg/g vaginal cream, Apply pea size amount around the opening of the vagina daily x 14 days, followed by twice weekly., Disp: 42.5 g,  "Rfl: 1    meclizine (ANTIVERT) 12.5 MG tablet, Take 1 tablet (12.5 mg total) by mouth 3 (three) times a day as needed for dizziness, Disp: 30 tablet, Rfl: 0    metFORMIN (GLUCOPHAGE-XR) 500 mg 24 hr tablet, TAKE 2 TABLETS BY MOUTH TWICE A DAY WITH FOOD, Disp: 360 tablet, Rfl: 1    ramipril (ALTACE) 10 MG capsule, Take 1 capsule (10 mg total) by mouth daily at bedtime, Disp: 90 capsule, Rfl: 3    torsemide (DEMADEX) 10 mg tablet, Take 1 tablet (10 mg total) by mouth daily, Disp: 30 tablet, Rfl: 0    Vitamin D, Cholecalciferol, 25 MCG (1000 UT) TABS, Take by mouth 2 (two) times a day, Disp: , Rfl:     albuterol (ProAir HFA) 90 mcg/act inhaler, Inhale 2 puffs every 4 (four) hours as needed for wheezing or shortness of breath (Patient not taking: Reported on 11/4/2024), Disp: 8.5 g, Rfl: 7    clotrimazole-betamethasone (LOTRISONE) 1-0.05 % cream, Apply topically 2 (two) times a day (Patient not taking: Reported on 3/26/2025), Disp: 45 g, Rfl: 2    Triprolidine-Pseudoephedrine (ANTIHISTAMINE PO), Take by mouth daily at bedtime (Patient not taking: Reported on 11/4/2024), Disp: , Rfl:         Review of Systems:  Review of Systems   Respiratory:  Positive for shortness of breath.    Cardiovascular:  Positive for palpitations and leg swelling. Negative for chest pain.   Musculoskeletal:  Positive for arthralgias.   All other systems reviewed and are negative.        Physical Exam:  Vitals:  Vitals:    03/26/25 0954   BP: 122/80   BP Location: Left arm   Patient Position: Sitting   Cuff Size: Large   Pulse: 77   SpO2: 98%   Weight: 95.3 kg (210 lb)   Height: 5' 4.5\" (1.638 m)     Physical Exam   Constitutional: She appears healthy. No distress.   Eyes: Pupils are equal, round, and reactive to light. Conjunctivae are normal.   Neck: No JVD present.   Cardiovascular: Normal rate, regular rhythm and normal heart sounds. Exam reveals no gallop and no friction rub.   No murmur heard.  Pulmonary/Chest: Effort normal and breath " sounds normal. She has no wheezes. She has no rales.   Musculoskeletal:         General: Edema present. No tenderness or deformity.      Cervical back: Normal range of motion and neck supple.   Neurological: She is alert and oriented to person, place, and time.   Skin: Skin is warm and dry.        Cardiographics:  EKG: Personally reviewed NSR with LVH and RBBB  Last known EF: 55%    This note was completed in part utilizing M-Modal Fluency Direct Software.  Grammatical errors, random word insertions, spelling mistakes, and incomplete sentences can be an occasional consequence of this system secondary to software limitations, ambient noise, and hardware issues.  If you have any questions or concerns about the content, text, or information contained within the body of this dictation, please contact the provider for clarification.

## 2025-03-26 NOTE — ASSESSMENT & PLAN NOTE
-  No ischemia or infarction present on last stress test.  Ejection fraction was normal on echocardiogram.  She had diastolic dysfunction.    - Discussed risk factor reduction including refraining from smoking, eating a diet high in fruits and vegetables, maintaining a healthy weight, limiting screen time along with controlling BP and cholesterol.  Encouraged to exercise 150 minutes a week at a moderate level such as a fast walk or 75 minutes of high intensity.

## 2025-04-07 RX ORDER — ESTRADIOL 0.1 MG/G
CREAM VAGINAL
Qty: 42.5 G | Refills: 0 | OUTPATIENT
Start: 2025-04-07

## 2025-04-16 DIAGNOSIS — I10 PRIMARY HYPERTENSION: ICD-10-CM

## 2025-04-16 RX ORDER — AMLODIPINE BESYLATE 5 MG/1
5 TABLET ORAL DAILY
Qty: 90 TABLET | Refills: 0 | Status: CANCELLED | OUTPATIENT
Start: 2025-04-16 | End: 2026-04-11

## 2025-04-19 DIAGNOSIS — I50.32 DIASTOLIC DYSFUNCTION WITH CHRONIC HEART FAILURE (HCC): ICD-10-CM

## 2025-04-22 RX ORDER — TORSEMIDE 10 MG/1
10 TABLET ORAL DAILY
Qty: 90 TABLET | Refills: 1 | Status: SHIPPED | OUTPATIENT
Start: 2025-04-22

## 2025-05-02 ENCOUNTER — APPOINTMENT (OUTPATIENT)
Dept: LAB | Facility: HOSPITAL | Age: 80
End: 2025-05-02
Attending: NURSE PRACTITIONER
Payer: MEDICARE

## 2025-05-02 DIAGNOSIS — Z13.0 SCREENING FOR DEFICIENCY ANEMIA: ICD-10-CM

## 2025-05-02 DIAGNOSIS — Z13.29 SCREENING FOR THYROID DISORDER: ICD-10-CM

## 2025-05-02 DIAGNOSIS — N18.2 TYPE 2 DIABETES MELLITUS WITH STAGE 2 CHRONIC KIDNEY DISEASE, WITHOUT LONG-TERM CURRENT USE OF INSULIN  (HCC): ICD-10-CM

## 2025-05-02 DIAGNOSIS — E11.22 TYPE 2 DIABETES MELLITUS WITH STAGE 2 CHRONIC KIDNEY DISEASE, WITHOUT LONG-TERM CURRENT USE OF INSULIN  (HCC): ICD-10-CM

## 2025-05-02 LAB
ALBUMIN SERPL BCG-MCNC: 4.2 G/DL (ref 3.5–5)
ALP SERPL-CCNC: 75 U/L (ref 34–104)
ALT SERPL W P-5'-P-CCNC: 24 U/L (ref 7–52)
ANION GAP SERPL CALCULATED.3IONS-SCNC: 9 MMOL/L (ref 4–13)
AST SERPL W P-5'-P-CCNC: 20 U/L (ref 13–39)
BILIRUB SERPL-MCNC: 0.53 MG/DL (ref 0.2–1)
BUN SERPL-MCNC: 17 MG/DL (ref 5–25)
CALCIUM SERPL-MCNC: 9.2 MG/DL (ref 8.4–10.2)
CHLORIDE SERPL-SCNC: 104 MMOL/L (ref 96–108)
CO2 SERPL-SCNC: 24 MMOL/L (ref 21–32)
CREAT SERPL-MCNC: 0.84 MG/DL (ref 0.6–1.3)
CREAT UR-MCNC: 194.5 MG/DL
ERYTHROCYTE [DISTWIDTH] IN BLOOD BY AUTOMATED COUNT: 12.7 % (ref 11.6–15.1)
GFR SERPL CREATININE-BSD FRML MDRD: 67 ML/MIN/1.73SQ M
GLUCOSE P FAST SERPL-MCNC: 194 MG/DL (ref 65–99)
HCT VFR BLD AUTO: 39.5 % (ref 34.8–46.1)
HGB BLD-MCNC: 13.3 G/DL (ref 11.5–15.4)
MCH RBC QN AUTO: 30 PG (ref 26.8–34.3)
MCHC RBC AUTO-ENTMCNC: 33.7 G/DL (ref 31.4–37.4)
MCV RBC AUTO: 89 FL (ref 82–98)
MICROALBUMIN UR-MCNC: 20.9 MG/L
MICROALBUMIN/CREAT 24H UR: 11 MG/G CREATININE (ref 0–30)
PLATELET # BLD AUTO: 255 THOUSANDS/UL (ref 149–390)
PMV BLD AUTO: 10.6 FL (ref 8.9–12.7)
POTASSIUM SERPL-SCNC: 4 MMOL/L (ref 3.5–5.3)
PROT SERPL-MCNC: 7.3 G/DL (ref 6.4–8.4)
RBC # BLD AUTO: 4.43 MILLION/UL (ref 3.81–5.12)
SODIUM SERPL-SCNC: 137 MMOL/L (ref 135–147)
TSH SERPL DL<=0.05 MIU/L-ACNC: 3.43 UIU/ML (ref 0.45–4.5)
WBC # BLD AUTO: 7.22 THOUSAND/UL (ref 4.31–10.16)

## 2025-05-02 PROCEDURE — 80053 COMPREHEN METABOLIC PANEL: CPT

## 2025-05-02 PROCEDURE — 83036 HEMOGLOBIN GLYCOSYLATED A1C: CPT

## 2025-05-02 PROCEDURE — 84443 ASSAY THYROID STIM HORMONE: CPT

## 2025-05-02 PROCEDURE — 85027 COMPLETE CBC AUTOMATED: CPT

## 2025-05-02 PROCEDURE — 36415 COLL VENOUS BLD VENIPUNCTURE: CPT

## 2025-05-03 LAB
EST. AVERAGE GLUCOSE BLD GHB EST-MCNC: 192 MG/DL
HBA1C MFR BLD: 8.3 %

## 2025-05-05 ENCOUNTER — OFFICE VISIT (OUTPATIENT)
Dept: FAMILY MEDICINE CLINIC | Facility: CLINIC | Age: 80
End: 2025-05-05
Payer: MEDICARE

## 2025-05-05 ENCOUNTER — RESULTS FOLLOW-UP (OUTPATIENT)
Dept: FAMILY MEDICINE CLINIC | Facility: CLINIC | Age: 80
End: 2025-05-05

## 2025-05-05 VITALS
TEMPERATURE: 97.7 F | SYSTOLIC BLOOD PRESSURE: 118 MMHG | RESPIRATION RATE: 14 BRPM | WEIGHT: 207 LBS | HEART RATE: 68 BPM | DIASTOLIC BLOOD PRESSURE: 78 MMHG | OXYGEN SATURATION: 95 % | HEIGHT: 65 IN | BODY MASS INDEX: 34.49 KG/M2

## 2025-05-05 DIAGNOSIS — F17.210 SMOKING GREATER THAN 20 PACK YEARS: ICD-10-CM

## 2025-05-05 DIAGNOSIS — E66.01 OBESITY, MORBID (HCC): ICD-10-CM

## 2025-05-05 DIAGNOSIS — E11.22 TYPE 2 DIABETES MELLITUS WITH STAGE 2 CHRONIC KIDNEY DISEASE, WITHOUT LONG-TERM CURRENT USE OF INSULIN  (HCC): Primary | ICD-10-CM

## 2025-05-05 DIAGNOSIS — I50.32 DIASTOLIC DYSFUNCTION WITH CHRONIC HEART FAILURE (HCC): ICD-10-CM

## 2025-05-05 DIAGNOSIS — I25.10 CORONARY ARTERY DISEASE INVOLVING NATIVE CORONARY ARTERY OF NATIVE HEART WITHOUT ANGINA PECTORIS: ICD-10-CM

## 2025-05-05 DIAGNOSIS — E78.5 DYSLIPIDEMIA: ICD-10-CM

## 2025-05-05 DIAGNOSIS — N18.2 TYPE 2 DIABETES MELLITUS WITH STAGE 2 CHRONIC KIDNEY DISEASE, WITHOUT LONG-TERM CURRENT USE OF INSULIN  (HCC): Primary | ICD-10-CM

## 2025-05-05 PROCEDURE — 99214 OFFICE O/P EST MOD 30 MIN: CPT | Performed by: NURSE PRACTITIONER

## 2025-05-05 PROCEDURE — G2211 COMPLEX E/M VISIT ADD ON: HCPCS | Performed by: NURSE PRACTITIONER

## 2025-05-05 NOTE — ASSESSMENT & PLAN NOTE
Complaint with statin and tolerating it well  Orders:  •  Lipid Panel with Direct LDL reflex; Future

## 2025-05-05 NOTE — PROGRESS NOTES
Name: Meka See      : 1945      MRN: [id number]  Encounter Provider: DAVID Jones  Encounter Date: 2025   Encounter department: [facility]  :  Assessment & Plan  Type 2 diabetes mellitus with stage 2 chronic kidney disease, without long-term current use of insulin  (HCC)  HbA1c not at goal and dicussed about starting jardiance as will also help with her cardiac health. Discussed possible adverse effects like UTI, marisel gangrene and KEVIN. Will discuss with her cardiologist tomorrow and if it is ok with them then will start jardiance  Lab Results   Component Value Date    HGBA1C 8.3 (H) 2025     Orders:  •  Comprehensive metabolic panel; Future  •  Hemoglobin A1C; Future    Smoking greater than 20 pack years    Orders:  •  CT lung screening program; Future    Obesity, morbid (HCC)  BMI Counseling: Body mass index is 34.98 kg/m². The BMI is above normal. Nutrition recommendations include reducing portion sizes, decreasing overall calorie intake, 3-5 servings of fruits/vegetables daily, reducing fast food intake, consuming healthier snacks, decreasing soda and/or juice intake, moderation in carbohydrate intake, increasing intake of lean protein, reducing intake of saturated fat and trans fat, and reducing intake of cholesterol. Exercise recommendations include exercising 3-5 times per week, joining a gym, and strength training exercises.        Dyslipidemia  Complaint with statin and tolerating it well  Orders:  •  Lipid Panel with Direct LDL reflex; Future    Coronary artery disease involving native coronary artery of native heart without angina pectoris  Managed by cardiologist       Diastolic dysfunction with chronic heart failure (HCC)  On torsemide  Wt Readings from Last 3 Encounters:   25 93.9 kg (207 lb)   25 95.3 kg (210 lb)   24 95.3 kg (210 lb)                      BMI Counseling: Body mass index is 34.98 kg/m². The BMI is above normal.  "Exercise recommendations include exercising 3-5 times per week and strength training exercises.     Depression Screening and Follow-up Plan: Patient was screened for depression during today's encounter. They screened negative with a PHQ-2 score of 2.      Falls Plan of Care: balance, strength, and gait training instructions were provided.       History of Present Illness   Patient is here to follow up on chronic conditions  Complaint with regimen and tolerating it well  Denies chest pain, sob, headache and dizziness  Blood work discussed and reviewed with patient.        Review of Systems   HENT: Negative.     Respiratory: Negative.     Cardiovascular: Negative.    Gastrointestinal: Negative.    Neurological: Negative.        Objective   /78   Pulse 68   Temp 97.7 °F (36.5 °C)   Resp 14   Ht 5' 4.5\" (1.638 m)   Wt 93.9 kg (207 lb)   SpO2 95%   BMI 34.98 kg/m²      Physical Exam  Constitutional:       Appearance: She is obese.   HENT:      Head: Normocephalic.   Eyes:      Conjunctiva/sclera: Conjunctivae normal.   Cardiovascular:      Rate and Rhythm: Normal rate and regular rhythm.      Pulses: no weak pulses.           Dorsalis pedis pulses are 1+ on the right side and 1+ on the left side.        Posterior tibial pulses are 1+ on the right side and 1+ on the left side.      Heart sounds: Murmur heard.   Pulmonary:      Effort: Pulmonary effort is normal.      Breath sounds: Normal breath sounds.   Feet:      Right foot:      Skin integrity: No ulcer, skin breakdown, erythema, warmth, callus or dry skin.      Left foot:      Skin integrity: No ulcer, skin breakdown, erythema, warmth, callus or dry skin.   Skin:     General: Skin is warm and dry.   Neurological:      Mental Status: She is alert and oriented to person, place, and time.   Psychiatric:         Mood and Affect: Mood normal.         Behavior: Behavior normal.         Thought Content: Thought content normal.         Judgment: Judgment normal. "         Patient's shoes and socks removed.    Right Foot/Ankle   Right Foot Inspection  Skin Exam: skin normal and skin intact. No dry skin, no warmth, no callus, no erythema, no maceration, no abnormal color, no pre-ulcer, no ulcer and no callus.     Toe Exam: ROM and strength within normal limits. No swelling, no tenderness, erythema and  no right toe deformity    Sensory   Vibration: intact  Proprioception: intact  Monofilament testing: intact    Vascular  Capillary refills: < 3 seconds  The right DP pulse is 1+. The right PT pulse is 1+.     Left Foot/Ankle  Left Foot Inspection  Skin Exam: skin normal and skin intact. No dry skin, no warmth, no erythema, no maceration, normal color, no pre-ulcer, no ulcer and no callus.     Toe Exam: ROM and strength within normal limits. No swelling, no tenderness, no erythema and no left toe deformity.     Sensory   Vibration: intact  Proprioception: intact  Monofilament testing: intact    Vascular  Capillary refills: < 3 seconds  The left DP pulse is 1+. The left PT pulse is 1+.     Assign Risk Category  No deformity present  No loss of protective sensation  No weak pulses  Risk: 0     Future Appointments   Date Time Provider Department Center   5/6/2025  3:00 PM DO TIFFANIE Joseph [facility]   6/26/2025 10:00 AM [facility] 1 [facility]   6/26/2025 10:30 AM [facility] 3 [facility]   11/5/2025 10:30 AM DAVID Jones [facility]     Current Outpatient Medications   Medication Sig Dispense Refill   • amLODIPine (NORVASC) 5 mg tablet Take 1 tablet (5 mg total) by mouth daily 90 tablet 3   • ASPIRIN 81 PO Take 81 mg by mouth every morning     • atorvastatin (LIPITOR) 80 mg tablet Take 1 tablet (80 mg total) by mouth daily 90 tablet 3   • Calcium Carbonate (CALTRATE 600 PO) Take by mouth in the morning     • carvedilol (COREG) 12.5 mg tablet Take 1 tablet (12.5 mg total) by mouth 2 (two) times a day with meals 180 tablet 3   • esomeprazole  (NexIUM) 40 MG capsule Take 40 mg by mouth every morning     • estradiol (ESTRACE VAGINAL) 0.1 mg/g vaginal cream Apply pea size amount around the opening of the vagina daily x 14 days, followed by twice weekly. 42.5 g 1   • meclizine (ANTIVERT) 12.5 MG tablet Take 1 tablet (12.5 mg total) by mouth 3 (three) times a day as needed for dizziness 30 tablet 0   • metFORMIN (GLUCOPHAGE-XR) 500 mg 24 hr tablet TAKE 2 TABLETS BY MOUTH TWICE A DAY WITH FOOD 360 tablet 1   • ramipril (ALTACE) 10 MG capsule Take 1 capsule (10 mg total) by mouth daily at bedtime 90 capsule 3   • torsemide (DEMADEX) 10 mg tablet TAKE 1 TABLET BY MOUTH EVERY DAY 90 tablet 1   • Vitamin D, Cholecalciferol, 25 MCG (1000 UT) TABS Take by mouth 2 (two) times a day     • albuterol (ProAir HFA) 90 mcg/act inhaler Inhale 2 puffs every 4 (four) hours as needed for wheezing or shortness of breath (Patient not taking: Reported on 11/4/2024) 8.5 g 7   • clotrimazole-betamethasone (LOTRISONE) 1-0.05 % cream Apply topically 2 (two) times a day (Patient not taking: Reported on 11/4/2024) 45 g 2   • Triprolidine-Pseudoephedrine (ANTIHISTAMINE PO) Take by mouth daily at bedtime (Patient not taking: Reported on 11/4/2024)       No current facility-administered medications for this visit.

## 2025-05-05 NOTE — ASSESSMENT & PLAN NOTE
HbA1c not at goal and dicussed about starting jardiance as will also help with her cardiac health. Discussed possible adverse effects like UTI, marisel gangrene and KEVIN. Will discuss with her cardiologist tomorrow and if it is ok with them then will start jardiance  Lab Results   Component Value Date    HGBA1C 8.3 (H) 05/02/2025     Orders:  •  Comprehensive metabolic panel; Future  •  Hemoglobin A1C; Future

## 2025-05-05 NOTE — ASSESSMENT & PLAN NOTE
BMI Counseling: Body mass index is 34.98 kg/m². The BMI is above normal. Nutrition recommendations include reducing portion sizes, decreasing overall calorie intake, 3-5 servings of fruits/vegetables daily, reducing fast food intake, consuming healthier snacks, decreasing soda and/or juice intake, moderation in carbohydrate intake, increasing intake of lean protein, reducing intake of saturated fat and trans fat, and reducing intake of cholesterol. Exercise recommendations include exercising 3-5 times per week, joining a gym, and strength training exercises.

## 2025-05-05 NOTE — ASSESSMENT & PLAN NOTE
Floor called and notified of pt transfer to floor. Delfina RN to call with any questions. Pt leaves this ER with no acute changes.   On torsemide  Wt Readings from Last 3 Encounters:   05/05/25 93.9 kg (207 lb)   03/26/25 95.3 kg (210 lb)   11/04/24 95.3 kg (210 lb)

## 2025-05-05 NOTE — PATIENT INSTRUCTIONS
Patient Education     Empagliflozin (em pa gli FLOE zin)   Brand Names: US Jardiance   Brand Names: Kenneth Jardiance   What is this drug used for?   It is used to lower blood sugar in people with type 2 diabetes.  It is used to lower the chance of death from heart disease in certain people.  It is used to lower the chance of having to go to the hospital for heart failure in certain people.  It is used in people with certain kidney problems to lower the risk of worsening kidney problems, hospital stays, and death.  What do I need to tell my doctor BEFORE I take this drug?   If you are allergic to this drug; any part of this drug; or any other drugs, foods, or substances. Tell your doctor about the allergy and what signs you had.  If you have type 1 diabetes. Do not use this drug to treat type 1 diabetes.  If you have an acidic blood problem.  If you have kidney disease or are on dialysis.  If you are dehydrated, talk with your doctor.  If you are taking or have recently taken drugs that suppress your immune system to treat kidney disease.  If you are pregnant or may be pregnant. Do not take this drug if you are in the second or third trimester of pregnancy.  If you are breast-feeding. Do not breast-feed while you take this drug.  This is not a list of all drugs or health problems that interact with this drug.  Tell your doctor and pharmacist about all of your drugs (prescription or OTC, natural products, vitamins) and health problems. You must check to make sure that it is safe for you to take this drug with all of your drugs and health problems. Do not start, stop, or change the dose of any drug without checking with your doctor.  What are some things I need to know or do while I take this drug?   Tell all of your health care providers that you take this drug. This includes your doctors, nurses, pharmacists, and dentists. This drug may need to be stopped before certain types of surgery as your doctor has told you.  If this drug is stopped, your doctor will tell you when to start taking this drug again after your surgery or procedure.  Do not drive if your blood sugar has been low. There is a greater chance of you having a crash.  To lower the chance of feeling dizzy or passing out, rise slowly if you have been sitting or lying down. Be careful going up and down stairs.  Be careful in hot weather or while being active. Drink lots of fluids to stop fluid loss.  High cholesterol has happened with this drug. If you have questions, talk with the doctor.  It may be harder to control blood sugar during times of stress such as fever, infection, injury, or surgery. A change in physical activity, exercise, or diet may also affect blood sugar.  Check your blood sugar as you have been told by your doctor.  Talk with your doctor about which glucose tests are best to use.  Too much acid in the blood or urine (ketoacidosis) has happened with this drug. This may need to be treated in the hospital and can be deadly. This can happen even when blood sugar is less than 250. People with diabetes or pancreas problems have a higher risk of ketoacidosis. The risk of ketoacidosis is also higher in people who are sick or dehydrated, cannot eat or drink like normal, skip meals, are on a ketogenic diet, or have surgery. The risk is also higher in people who use insulin and take less than the normal insulin dose or miss the dose. Check your ketones as you have been told by the doctor. If you have questions, talk with your doctor.  Severe urinary tract infections (UTIs) have happened with this drug. Sometimes, this needed to be treated in a hospital.  Kidney problems have happened. Sometimes, these may need to be treated in the hospital or with dialysis.  Talk with your doctor before you drink alcohol.  A rare but very bad infection has happened with drugs like this one. This infection may be deadly. Get medical help right away if your genitals or the area  between your genitals and rectum becomes tender, red, or swollen, and you have a fever or do not feel well.  This drug may raise the risk of lower limb amputations. Toe and foot amputations have happened most often. Talk to your doctor about how to take care of your feet. Tell your doctor if you have ever had an amputation, blood vessel disease, nerve disease, or a foot ulcer caused by diabetes. Call your doctor right away if you have new pain or tenderness, sores or ulcers, or infections in your legs or feet.  If you are 65 or older, use this drug with care. You could have more side effects.  Use this drug with care in children. They may have more chance of low blood sugar.  This drug may cause harm to the unborn baby if you take it while you are pregnant. If you are pregnant or you get pregnant while taking this drug, call your doctor right away.  What are some side effects that I need to call my doctor about right away?   WARNING/CAUTION: Even though it may be rare, some people may have very bad and sometimes deadly side effects when taking a drug. Tell your doctor or get medical help right away if you have any of the following signs or symptoms that may be related to a very bad side effect:  Signs of an allergic reaction, like rash; hives; itching; red, swollen, blistered, or peeling skin with or without fever; wheezing; tightness in the chest or throat; trouble breathing, swallowing, or talking; unusual hoarseness; or swelling of the mouth, face, lips, tongue, or throat.  Signs of fluid and electrolyte problems like mood changes, confusion, muscle pain or weakness, fast or abnormal heartbeat, severe dizziness or passing out, increased thirst, seizures, feeling very tired or weak, decreased appetite, unable to pass urine or change in the amount of urine produced, dry mouth, dry eyes, or severe upset stomach or throwing up.  Signs of kidney problems like unable to pass urine, change in how much urine is passed,  blood in the urine, or a big weight gain.  Signs of too much acid in the blood (acidosis) like confusion; fast breathing; fast heartbeat; a heartbeat that does not feel normal; very bad stomach pain, upset stomach, or throwing up; feeling very sleepy; shortness of breath; or feeling very tired or weak.  Signs of a urinary tract infection (UTI) like blood in the urine, burning or pain when passing urine, feeling the need to pass urine often or right away, fever, lower stomach pain, or pelvic pain.  Vaginal yeast infection. Report itching or discharge.  Yeast infection of the penis. Report pain, swelling, rash, or discharge.  Low blood sugar can happen. The chance may be raised when this drug is used with other drugs for diabetes. Signs may be dizziness, headache, feeling sleepy or weak, shaking, fast heartbeat, confusion, hunger, or sweating. Call your doctor right away if you have any of these signs. Follow what you have been told to do for low blood sugar. This may include taking glucose tablets, liquid glucose, or some fruit juices.  What are some other side effects of this drug?   All drugs may cause side effects. However, many people have no side effects or only have minor side effects. Call your doctor or get medical help if you have any side effects that bother you or do not go away.  These are not all of the side effects that may occur. If you have questions about side effects, call your doctor. Call your doctor for medical advice about side effects.  You may report side effects to your national health agency.  You may report side effects to the FDA at 1-687.157.9672. You may also report side effects at https://www.fda.gov/medwatch.  How is this drug best taken?   Use this drug as ordered by your doctor. Read all information given to you. Follow all instructions closely.  Take with or without food.  Take in the morning.  Drink lots of noncaffeine liquids unless told to drink less liquid by your doctor.  Keep  taking this drug as you have been told by your doctor or other health care provider, even if you feel well.  If you are not able to eat or drink like normal, talk with your doctor. This includes if you are sick, fasting, or you are having certain procedures or surgery.  If you cannot drink liquids by mouth or if you have upset stomach, throwing up, or diarrhea that does not go away, you need to avoid getting dehydrated. Contact your doctor to find out what to do. Dehydration may lead to low blood pressure or to new or worsening kidney problems.  Have blood work checked as you have been told by the doctor. Talk with the doctor.  This drug may affect certain lab tests. Tell all of your health care providers and lab workers that you take this drug.  Follow the diet and workout plan that your doctor told you about.  If you are on a low-salt or salt-free diet, talk with your doctor.  What do I do if I miss a dose?   Take a missed dose as soon as you think about it.  If it is close to the time for your next dose, skip the missed dose and go back to your normal time.  Do not take 2 doses at the same time or extra doses.  If you are not sure what to do if you miss a dose, call your doctor.  How do I store and/or throw out this drug?   Store at room temperature in a dry place. Do not store in a bathroom.  Keep all drugs in a safe place. Keep all drugs out of the reach of children and pets.  Throw away unused or  drugs. Do not flush down a toilet or pour down a drain unless you are told to do so. Check with your pharmacist if you have questions about the best way to throw out drugs. There may be drug take-back programs in your area.  General drug facts   If your symptoms or health problems do not get better or if they become worse, call your doctor.  Do not share your drugs with others and do not take anyone else's drugs.  Some drugs may have another patient information leaflet. If you have any questions about this  drug, please talk with your doctor, nurse, pharmacist, or other health care provider.  This drug comes with an extra patient fact sheet called a Medication Guide. Read it with care. Read it again each time this drug is refilled. If you have any questions about this drug, please talk with the doctor, pharmacist, or other health care provider.  If you think there has been an overdose, call your poison control center or get medical care right away. Be ready to tell or show what was taken, how much, and when it happened.  Consumer Information Use and Disclaimer   This generalized information is a limited summary of diagnosis, treatment, and/or medication information. It is not meant to be comprehensive and should be used as a tool to help the user understand and/or assess potential diagnostic and treatment options. It does NOT include all information about conditions, treatments, medications, side effects, or risks that may apply to a specific patient. It is not intended to be medical advice or a substitute for the medical advice, diagnosis, or treatment of a health care provider based on the health care provider's examination and assessment of a patient's specific and unique circumstances. Patients must speak with a health care provider for complete information about their health, medical questions, and treatment options, including any risks or benefits regarding use of medications. This information does not endorse any treatments or medications as safe, effective, or approved for treating a specific patient. [employer] and its affiliates disclaim any warranty or liability relating to this information or the use thereof. The use of this information is governed by the Terms of Use, available at https://www.wolterskluwer.com/en/know/clinical-effectiveness-terms.  Last Reviewed Date   2023-10-05  Copyright   © 2024 [employer] and its affiliates and/or licensors. All rights reserved.

## 2025-05-06 ENCOUNTER — OFFICE VISIT (OUTPATIENT)
Dept: CARDIOLOGY CLINIC | Facility: CLINIC | Age: 80
End: 2025-05-06
Payer: MEDICARE

## 2025-05-06 ENCOUNTER — TELEPHONE (OUTPATIENT)
Dept: ADMINISTRATIVE | Facility: OTHER | Age: 80
End: 2025-05-06

## 2025-05-06 VITALS
HEIGHT: 65 IN | OXYGEN SATURATION: 98 % | SYSTOLIC BLOOD PRESSURE: 128 MMHG | BODY MASS INDEX: 34.16 KG/M2 | WEIGHT: 205 LBS | DIASTOLIC BLOOD PRESSURE: 80 MMHG | HEART RATE: 74 BPM

## 2025-05-06 DIAGNOSIS — N18.2 TYPE 2 DIABETES MELLITUS WITH STAGE 2 CHRONIC KIDNEY DISEASE, WITHOUT LONG-TERM CURRENT USE OF INSULIN  (HCC): Primary | ICD-10-CM

## 2025-05-06 DIAGNOSIS — I50.32 DIASTOLIC DYSFUNCTION WITH CHRONIC HEART FAILURE (HCC): ICD-10-CM

## 2025-05-06 DIAGNOSIS — I10 PRIMARY HYPERTENSION: ICD-10-CM

## 2025-05-06 DIAGNOSIS — E11.22 TYPE 2 DIABETES MELLITUS WITH STAGE 2 CHRONIC KIDNEY DISEASE, WITHOUT LONG-TERM CURRENT USE OF INSULIN  (HCC): Primary | ICD-10-CM

## 2025-05-06 PROCEDURE — 99214 OFFICE O/P EST MOD 30 MIN: CPT | Performed by: INTERNAL MEDICINE

## 2025-05-06 NOTE — TELEPHONE ENCOUNTER
----- Message from DAVID Jones sent at 5/5/2025 12:38 PM EDT -----  Regarding: care gap request  Contact: [phone]  12/03/20 11:09 AM    Hello, our patient attached above has had Diabetic Eye Exam completed/performed. Please assist in updating the patient chart by pulling the document from the Media Tab. The date of service is 11/6/24.     Thank you,  DAVID Jones  [facility]

## 2025-05-06 NOTE — PROGRESS NOTES
Cardiology   Domenico Waller DO, [facility]  Nehemiah Lares MD, [facility]  Michael Watts MD, [facility]  Madalyn Garcia MD, [facility]  -------------------------------------------------------------------  [facility]  [address], [address], [address]  [place], [place], [address]  [phone]    Cardiology Follow Up  Meka See  1945  [id number]          Assessment/Plan:    Assessment & Plan  Diastolic dysfunction with chronic heart failure (HCC)  Wt Readings from Last 3 Encounters:   05/06/25 93 kg (205 lb)   05/05/25 93.9 kg (207 lb)   03/26/25 95.3 kg (210 lb)     - There was an improvement in edema with holding amlodipine and use of torsemide.    - She felt symptoms while off amlodipine and restarted it.  Monitor for worsened LE edema  - Begin Jardiance 10 mg daily.  May be able to stop torsemide    Type 2 diabetes mellitus with stage 2 chronic kidney disease, without long-term current use of insulin  (Formerly [facility])    Lab Results   Component Value Date    HGBA1C 8.3 (H) 05/02/2025   - Begin Jardiance  Primary hypertension  - BP controlled with amlodipine, carvedilol and ramipril      Interval History:     Meka See is 79 y.o. female here for followup of CAD.    She was seen 2 weeks ago and was noted to be short of breath and had worsening LE edema.  Amlodipine was stopped and she was started on torsemide.  LE edema has resolved.  She restarted amlodipine after feeling short of breath and fatigue after stopping it.    She is now feeling well.        She had a stress test and echocardiogram done in May 2022.  There was no ischemia seen on stress test and echocardiogram showed normal ejection fraction with mild valve disease.  There was grade 1 diastolic dysfunction.    She has a history of coronary artery disease with previous multivessel PCI in 2012 and 2013 (PCI mLAD and D1 stents - 1/9/2013 + PCI: pRCA, mRCA, PDA - 5/29/2012 .  She denies any history of myocardial  infarction.  She was feeling fatigue and weakness prior to stent placement which improved after her stents were complete.  Nuclear stress test was done which did not show any ischemia or infarction.  Ejection fraction was 80%  Echocardiogram showed ejection fraction of 60% with moderate LA dilation and mild valve disease.       The following portions of the patient's history were reviewed and updated as appropriate: allergies, current medications, past family history, past medical history, past social history, past surgical history, and problem list.       Current Outpatient Medications:     amLODIPine (NORVASC) 5 mg tablet, Take 1 tablet (5 mg total) by mouth daily, Disp: 90 tablet, Rfl: 3    ASPIRIN 81 PO, Take 81 mg by mouth every morning, Disp: , Rfl:     atorvastatin (LIPITOR) 80 mg tablet, Take 1 tablet (80 mg total) by mouth daily, Disp: 90 tablet, Rfl: 3    Calcium Carbonate (CALTRATE 600 PO), Take by mouth in the morning, Disp: , Rfl:     carvedilol (COREG) 12.5 mg tablet, Take 1 tablet (12.5 mg total) by mouth 2 (two) times a day with meals, Disp: 180 tablet, Rfl: 3    Empagliflozin (Jardiance) 10 MG TABS tablet, Take 1 tablet (10 mg total) by mouth every morning, Disp: 90 tablet, Rfl: 3    esomeprazole (NexIUM) 40 MG capsule, Take 40 mg by mouth every morning, Disp: , Rfl:     estradiol (ESTRACE VAGINAL) 0.1 mg/g vaginal cream, Apply pea size amount around the opening of the vagina daily x 14 days, followed by twice weekly., Disp: 42.5 g, Rfl: 1    meclizine (ANTIVERT) 12.5 MG tablet, Take 1 tablet (12.5 mg total) by mouth 3 (three) times a day as needed for dizziness, Disp: 30 tablet, Rfl: 0    metFORMIN (GLUCOPHAGE-XR) 500 mg 24 hr tablet, TAKE 2 TABLETS BY MOUTH TWICE A DAY WITH FOOD, Disp: 360 tablet, Rfl: 1    ramipril (ALTACE) 10 MG capsule, Take 1 capsule (10 mg total) by mouth daily at bedtime, Disp: 90 capsule, Rfl: 3    torsemide (DEMADEX) 10 mg tablet, TAKE 1 TABLET BY MOUTH EVERY DAY, Disp: 90  "tablet, Rfl: 1    Vitamin D, Cholecalciferol, 25 MCG (1000 UT) TABS, Take by mouth 2 (two) times a day, Disp: , Rfl:     albuterol (ProAir HFA) 90 mcg/act inhaler, Inhale 2 puffs every 4 (four) hours as needed for wheezing or shortness of breath (Patient not taking: Reported on 11/4/2024), Disp: 8.5 g, Rfl: 7    clotrimazole-betamethasone (LOTRISONE) 1-0.05 % cream, Apply topically 2 (two) times a day (Patient not taking: Reported on 5/6/2025), Disp: 45 g, Rfl: 2    Triprolidine-Pseudoephedrine (ANTIHISTAMINE PO), Take by mouth daily at bedtime (Patient not taking: Reported on 11/4/2024), Disp: , Rfl:         Review of Systems:  Review of Systems   Cardiovascular:  Positive for leg swelling. Negative for chest pain and palpitations.   Musculoskeletal:  Positive for arthralgias.   All other systems reviewed and are negative.        Physical Exam:  Vitals:  Vitals:    05/06/25 1457   BP: 128/80   BP Location: Left arm   Patient Position: Sitting   Cuff Size: Large   Pulse: 74   SpO2: 98%   Weight: 93 kg (205 lb)   Height: 5' 4.5\" (1.638 m)     Physical Exam   Constitutional: She appears healthy. No distress.   Eyes: Pupils are equal, round, and reactive to light. Conjunctivae are normal.   Neck: No JVD present.   Cardiovascular: Normal rate, regular rhythm and normal heart sounds. Exam reveals no gallop and no friction rub.   No murmur heard.  Pulmonary/Chest: Effort normal and breath sounds normal. She has no wheezes. She has no rales.   Musculoskeletal:         General: Edema present. No tenderness or deformity.      Cervical back: Normal range of motion and neck supple.   Neurological: She is alert and oriented to person, place, and time.   Skin: Skin is warm and dry.        Cardiographics:  EKG: Personally reviewed NSR with LVH and RBBB  Last known EF: 55%    This note was completed in part utilizing [employer] Software.  Grammatical errors, random word insertions, spelling mistakes, and incomplete " sentences can be an occasional consequence of this system secondary to software limitations, ambient noise, and hardware issues.  If you have any questions or concerns about the content, text, or information contained within the body of this dictation, please contact the provider for clarification.

## 2025-05-06 NOTE — TELEPHONE ENCOUNTER
Upon review of the In Basket request we were able to locate, review, and update the patient chart as requested for Diabetic Eye Exam.    Any additional questions or concerns should be emailed to the Practice Liaisons via the appropriate education email address, please do not reply via In Basket.    Thank you  Virginia Romero MA   [facility]

## 2025-05-06 NOTE — ASSESSMENT & PLAN NOTE
Wt Readings from Last 3 Encounters:   05/06/25 93 kg (205 lb)   05/05/25 93.9 kg (207 lb)   03/26/25 95.3 kg (210 lb)     - There was an improvement in edema with holding amlodipine and use of torsemide.    - She felt symptoms while off amlodipine and restarted it.  Monitor for worsened LE edema  - Begin Jardiance 10 mg daily.  May be able to stop torsemide

## 2025-06-04 DIAGNOSIS — N95.2 VAGINAL ATROPHY: ICD-10-CM

## 2025-06-04 DIAGNOSIS — N94.10 FEMALE DYSPAREUNIA: ICD-10-CM

## 2025-06-04 RX ORDER — ESTRADIOL 0.1 MG/G
CREAM VAGINAL
Qty: 42.5 G | Refills: 1 | Status: SHIPPED | OUTPATIENT
Start: 2025-06-04

## 2025-06-26 ENCOUNTER — HOSPITAL ENCOUNTER (OUTPATIENT)
Dept: RADIOLOGY | Facility: HOSPITAL | Age: 80
Discharge: HOME | End: 2025-06-26
Payer: MEDICARE

## 2025-06-26 VITALS — HEIGHT: 65 IN | BODY MASS INDEX: 34.16 KG/M2 | WEIGHT: 205 LBS

## 2025-06-26 DIAGNOSIS — R92.8 ABNORMAL MAMMOGRAM: ICD-10-CM

## 2025-06-26 PROCEDURE — 76642 ULTRASOUND BREAST LIMITED: CPT

## 2025-06-26 PROCEDURE — 77066 DX MAMMO INCL CAD BI: CPT

## 2025-06-26 PROCEDURE — G0279 TOMOSYNTHESIS, MAMMO: HCPCS

## 2025-07-11 ENCOUNTER — TELEPHONE (OUTPATIENT)
Age: 80
End: 2025-07-11

## 2025-07-11 NOTE — TELEPHONE ENCOUNTER
Received call from patient regarding her Jardiance. She states she thought she had a yeast infection that was causing a rash, but it wasn't going away with a topical antibiotic she had used in the past for a yeast infection under her breasts. She then saw a Jardiance commercial that listed a rash as a side effect and would like to know if that can be what is causing her rash. Please advise.

## 2025-07-11 NOTE — TELEPHONE ENCOUNTER
Pt states the commercial shows and can cause a rash in the area just like hers and the timing is coincidental, she would like to just discontinue the jardiance. Pls advise

## 2025-08-04 ENCOUNTER — OFFICE VISIT (OUTPATIENT)
Dept: OBGYN CLINIC | Facility: CLINIC | Age: 80
End: 2025-08-04
Payer: MEDICARE

## 2025-08-04 VITALS — SYSTOLIC BLOOD PRESSURE: 130 MMHG | DIASTOLIC BLOOD PRESSURE: 78 MMHG | BODY MASS INDEX: 34.64 KG/M2 | WEIGHT: 205 LBS

## 2025-08-04 DIAGNOSIS — N94.10 FEMALE DYSPAREUNIA: ICD-10-CM

## 2025-08-04 DIAGNOSIS — N95.2 VAGINAL ATROPHY: ICD-10-CM

## 2025-08-04 PROCEDURE — 99212 OFFICE O/P EST SF 10 MIN: CPT | Performed by: NURSE PRACTITIONER

## 2025-08-04 RX ORDER — ESTRADIOL 0.1 MG/G
CREAM VAGINAL
Qty: 42.5 G | Refills: 3 | Status: SHIPPED | OUTPATIENT
Start: 2025-08-04

## 2025-08-05 ENCOUNTER — OFFICE VISIT (OUTPATIENT)
Dept: PHYSICAL THERAPY | Facility: CLINIC | Age: 80
End: 2025-08-05
Payer: MEDICARE

## 2025-08-05 DIAGNOSIS — M25.561 ACUTE PAIN OF RIGHT KNEE: Primary | ICD-10-CM

## 2025-08-05 PROCEDURE — 97530 THERAPEUTIC ACTIVITIES: CPT

## 2025-08-05 PROCEDURE — 97161 PT EVAL LOW COMPLEX 20 MIN: CPT

## 2025-08-08 ENCOUNTER — OFFICE VISIT (OUTPATIENT)
Dept: PHYSICAL THERAPY | Facility: CLINIC | Age: 80
End: 2025-08-08
Payer: MEDICARE

## 2025-08-08 DIAGNOSIS — M25.561 ACUTE PAIN OF RIGHT KNEE: Primary | ICD-10-CM

## 2025-08-08 PROCEDURE — 97112 NEUROMUSCULAR REEDUCATION: CPT

## 2025-08-08 PROCEDURE — 97110 THERAPEUTIC EXERCISES: CPT

## 2025-08-11 ENCOUNTER — OFFICE VISIT (OUTPATIENT)
Dept: PHYSICAL THERAPY | Facility: CLINIC | Age: 80
End: 2025-08-11
Payer: MEDICARE

## 2025-08-13 ENCOUNTER — OFFICE VISIT (OUTPATIENT)
Dept: PHYSICAL THERAPY | Facility: CLINIC | Age: 80
End: 2025-08-13
Payer: MEDICARE

## 2025-08-20 ENCOUNTER — OFFICE VISIT (OUTPATIENT)
Dept: PHYSICAL THERAPY | Facility: CLINIC | Age: 80
End: 2025-08-20
Payer: MEDICARE

## 2025-08-20 DIAGNOSIS — M25.561 ACUTE PAIN OF RIGHT KNEE: Primary | ICD-10-CM

## 2025-08-20 PROCEDURE — 97110 THERAPEUTIC EXERCISES: CPT

## 2025-08-20 PROCEDURE — 97112 NEUROMUSCULAR REEDUCATION: CPT
